# Patient Record
Sex: FEMALE | Race: WHITE | Employment: OTHER | URBAN - METROPOLITAN AREA
[De-identification: names, ages, dates, MRNs, and addresses within clinical notes are randomized per-mention and may not be internally consistent; named-entity substitution may affect disease eponyms.]

---

## 2020-07-08 ENCOUNTER — HOSPITAL ENCOUNTER (OUTPATIENT)
Dept: PHYSICAL THERAPY | Age: 70
Discharge: HOME | End: 2020-07-08
Payer: MEDICARE

## 2020-07-08 PROCEDURE — 97162 PT EVAL MOD COMPLEX 30 MIN: CPT

## 2020-07-08 PROCEDURE — 97110 THERAPEUTIC EXERCISES: CPT

## 2020-07-08 NOTE — PROGRESS NOTES
Kana Suarez  : 1950  [place]  [place] Annabella Brown 25 @ [address] [address].  [phone]   [phone]      OUTPATIENT PHYSICAL THERAPY: Daily Treatment Note 2020  Visit Count:  1    ICD-10: Treatment Diagnosis: Pain in left shoulder (M25.512) and Stiffness of left shoulder, not elsewhere classified (M25.612)  TREATMENT PLAN:  Effective Dates: 2020 TO 2020 (30 days). Frequency/Duration: 2 times a week for 30 Day(s)  GOALS: (Goals have been discussed and agreed upon with patient.)  Short-Term Functional Goals: Time Frame: 2 weeks  # Goal Status   1 Pt will confirm compliance with home program to facilitate improvement in function. NEW     Discharge goals: Time frame: 4 weeks   # Goal Status   1 Pt will demonstrate symptom-free cervical AROM in full range to be able to look over shoulder for ADLs. NEW   2 Pt will be able to use UEs without symptoms in a functional ROM to participate in tasks such as cutting toenails and pulling weeds. NEW       Pre-treatment Symptoms/Complaints:  Pt reports no significant pain at rest right now, but mostly with repetitive activity. Pain: Initial:   1/10 Post Session:  No pain   Medications Last Reviewed: 2020  Updated Objective Findings: Below measures from initial evaluation unless otherwise noted. 20  Pain at worst: 10/10  ROM: Cervical AROM WNL but pain in L posterior shoulder/neck with end range L rotation and with end range R rotation + flexion. B shoulder AROM WFL but pain with end range flexion on L. No pain with Apley's tests B.   UE ANDT: All within normal limits B  Strength: WNL for B shoulders with no pain with MMT.  Pt reports that pulling weeds that have a lot of resistance is what provokes L sided pain  Palpation: TTP in L LS near scapular insertion  DASH:     TREATMENT:   THERAPEUTIC ACTIVITY: (see chart for minutes): Therapeutic activities per grid below to improve mobility, strength, balance and coordination. Required minimal visual, verbal and tactile cues to improve independence with functional mobility and activities. THERAPEUTIC EXERCISE: (see chart for minutes):  Exercises per grid below to improve mobility, strength, balance and coordination. Required minimal visual, verbal and tactile cues to promote proper body alignment and promote proper body mechanics. Progressed resistance, range, repetitions and complexity of movement as indicated. NEUROMUSCULAR RE-EDUCATION: (see chart for minutes):  Exercise/activities per grid below to improve balance, coordination, kinesthetic sense, posture, pain, and proprioception. Required minimal visual, verbal and tactile cues to promote motor control of left, upper extremity(s). MANUAL THERAPY: (see chart for minutes): Joint mobilization, Soft tissue mobilization, skin mobilization, and muscle energy techniques were utilized and necessary because of the patient's restricted joint motion, restricted motion of soft tissue and pain . MODALITIES: (see chart for minutes): *  Hot Pack Therapy in order to provide analgesia. Date: 7/8/20 (visit 1)       Modalities:                Therapeutic Exercise: 25 min        Prone elbow lift: x5 B   S/L abduction: x5 L  S/L ER: x5 L   Chin tuck in prone: x5  Chin tuck in seated: 2x5 with tactile cues   Lat pull-down bent over: x5 L green band with chin tuck  Pt educated on using chin tuck while working in yard to [facility]. Pt educated on home program implementation and given printout. Neuromuscular re-education                Manual Therapy:                Therapeutic Activities:                  Home program: 7/8/20: chin tuck, lat pull down    [facility] Portal  Treatment/Session Summary:    · Response to Treatment: Pt tolerated exercises well and reported no pain afterwards.   · Communication/Consultation:  None today  · Equipment provided today:  7/8/20: green band  · Recommendations/Intent for next treatment session: Next visit will focus on improving pain and activity tolerance.     Total Treatment Billable Duration:  45 minutes  PT Patient Time In/Time Out  Time In: 0930  Time Out: [facility] 7775, PT, DPT    Future Appointments   Date Time Provider Carleen Jenkins   7/13/2020  9:30 AM Bettye Cancel [facility]   7/15/2020  9:30 AM [facility]e Cancel [facility]   7/20/2020  9:30 AM [facility]e Cancel [facility]   7/22/2020  9:30 AM [facility]e Cancel [facility]   7/27/2020  9:30 [facility]   7/29/2020  9:30 AM [facility]

## 2020-07-08 NOTE — THERAPY EVALUATION
Shree Cruz  : 1950  [place]  [place] Annabella Brown 25 @ [address], [address] [place] [address]  [phone]   [phone]       OUTPATIENT PHYSICAL THERAPY:Initial Assessment 2020   ICD-10: Treatment Diagnosis: Pain in left shoulder (M25.512) and Stiffness of left shoulder, not elsewhere classified (M25.612)    Precautions/Allergies:   Patient has no allergy information on record. Ambulatory/Rehab Services H2 Model Falls Risk Assessment    Risk Factors:       No Risk Factors Identified Ability to Rise from Chair:       (0)  Ability to rise in a single movement    Falls Prevention Plan:       No modifications necessary   Total: (5 or greater = High Risk): 0     [facility]. All Rights Reserved. [facility] Patent #[id number]. Federal Law prohibits the replication, distribution or use without written permission from [employer]      MEDICAL/REFERRING DIAGNOSIS:  Acute pain of right shoulder [M25.511]  Scapular dyskinesis [G25.89]   DATE OF ONSET: 2020  REFERRING PHYSICIAN: Jenny Stone MD MD Orders: evaluate and treat  Return MD Appointment: 20   INITIAL ASSESSMENT:  Ms. Bharti Sherman presents with impaired strength, ROM and pain of left shoulder and neck . This limits reaching and lifting tolerance and restricts ability to participate in ADLs. . Patient would benefit from skilled physical therapy to address above impairments. PROBLEM LIST (Impacting functional limitations):  1. Decreased Strength  2. Decreased ADL/Functional Activities  3. Increased Pain  4. Decreased Activity Tolerance  5. Edema/Girth INTERVENTIONS PLANNED: (Treatment may consist of any combination of the following)  1. Cryotherapy  2. Electrical Stimulation  3. Heat  4. Home Exercise Program (HEP)  5. Manual Therapy  6. Neuromuscular Re-education/Strengthening  7.  Therapeutic Activites  8. Therapeutic Exercise/Strengthening   TREATMENT PLAN:  Effective Dates: 7/8/2020 TO 8/7/2020 (30 days). Frequency/Duration: 2 times a week for 30 Day(s)  GOALS: (Goals have been discussed and agreed upon with patient.)  Short-Term Functional Goals: Time Frame: 2 weeks  # Goal Status   1 Pt will confirm compliance with home program to facilitate improvement in function. NEW     Discharge goals: Time frame: 4 weeks   # Goal Status   1 Pt will demonstrate symptom-free cervical AROM in full range to be able to look over shoulder for ADLs. NEW   2 Pt will be able to use UEs without symptoms in a functional ROM to participate in tasks such as cutting toenails and pulling weeds. NEW        Rehabilitation Potential For Stated Goals: Good  Regarding Krissy Renteria therapy, I certify that the treatment plan above will be carried out by a therapist or under their direction. Thank you for this referral,  Daljit Estrada PT, DPT     Referring Physician Signature: Misti Holcomb MD _______________________________ Date _____________     HISTORY:   History of Injury/Illness (Reason for Referral):  Pt reports ~March 2020 she was carrying groceries up the stairs and they were uneven and she strained the muscles of her L posterior shoulder/scapula. She reports that it bothers her whenever she does tasks where her head is down and she is leaning forward such as working in yard or bending over to cut toenails. She reports pain will feel hot and biting and she has to lie down for 5 minutes to get it to ease off. She reports the pain is severely limiting her activity tolerance. She notices it most with pulling weeds using L arm. She reports hot shower helps her shoulder. She would like to return to no pain with L shoulder use and return to yard work like normal.  Past Medical History/Comorbidities:   Ms. Diana Beard  has no past medical history on file. Ms. Diana Beard  has no past surgical history on file. High cholesterol.  Lasik surgery 7/1999  Social History/Living Environment:     Pt lives with spouse/significant other in two-story home with stairs in home and walk-in shower. Prior Level of Function/Work/Activity:  Pt is retired . Pt had unrestricted prior level of function. Current Medications:     No current outpatient medications on file. levothyroxine and rosuvastatin    Date Last Reviewed:  7/8/2020    Number of Personal Factors/Comorbidities that affect the Plan of Care: 1-2: MODERATE COMPLEXITY   EXAMINATION:   Pain at worst: 10/10  ROM: Cervical AROM WNL but pain in L posterior shoulder/neck with end range L rotation and with end range R rotation + flexion. B shoulder AROM WFL but pain with end range flexion on L. No pain with Apley's tests B.   UE ANDT: All within normal limits B  Strength: WNL for B shoulders with no pain with MMT. Pt reports that pulling weeds that have a lot of resistance is what provokes L sided pain  Palpation: TTP in L LS near scapular insertion   Body Structures Involved:  1. Nerves  2. Joints  3. Muscles Body Functions Affected:  1. Sensory/Pain  2. Neuromusculoskeletal  3. Movement Related Activities and Participation Affected:  1. General Tasks and Demands  2. Self Care  3. Community, Social and [place]   Number of elements (examined above) that affect the Plan of Care: 3: MODERATE COMPLEXITY   CLINICAL PRESENTATION:   Presentation: Evolving clinical presentation with changing clinical characteristics: MODERATE COMPLEXITY     CLINICAL DECISION MAKING:      OUTCOME MEASURE:   Initial outcome measure performed on 7/8/2020. Tool Used: Disabilities of the Arm, Shoulder and Hand (DASH) Questionnaire - Quick Version  Score:  Initial: 25/55  Most Recent: X/55 (Date: -- )   Interpretation of Score: The DASH is designed to measure the activities of daily living in person's with upper extremity dysfunction or pain. Each section is scored on a 1-5 scale, 5 representing the greatest disability.   The scores of each section are added together for a total score of 55. MEDICAL NECESSITY:   · Patient will benefit from skilled physical therapy to address their previously listed impairments in order to improve their independence with functional activities painfree. REASON FOR SERVICES/OTHER COMMENTS:  · Patient requires present interventions due to patient's inability to perform functional and recreational activities painfree.    Use of outcome tool(s) and clinical judgement create a POC that gives a: Questionable prediction of patient's progress: MODERATE COMPLEXITY        Total Duration: 45 min  PT Patient Time In/Time Out  Time In: 0930  Time Out: Professor Mora 108, PT, DPT

## 2020-07-10 ENCOUNTER — APPOINTMENT (OUTPATIENT)
Dept: PHYSICAL THERAPY | Age: 70
End: 2020-07-10
Payer: MEDICARE

## 2020-07-13 ENCOUNTER — HOSPITAL ENCOUNTER (OUTPATIENT)
Dept: PHYSICAL THERAPY | Age: 70
Discharge: HOME | End: 2020-07-13
Payer: MEDICARE

## 2020-07-13 PROCEDURE — 97110 THERAPEUTIC EXERCISES: CPT

## 2020-07-13 NOTE — PROGRESS NOTES
Husam Jimenez  : 1950  [place]  [place]: [place] Velora Offer. [facility] @ [address], [address] [place] [address]  [phone]   [phone]      OUTPATIENT PHYSICAL THERAPY: Daily Treatment Note 2020  Visit Count:  2    ICD-10: Treatment Diagnosis: Pain in left shoulder (M25.512) and Stiffness of left shoulder, not elsewhere classified (M25.612)  TREATMENT PLAN:  Effective Dates: 2020 TO 2020 (30 days). Frequency/Duration: 2 times a week for 30 Day(s)  GOALS: (Goals have been discussed and agreed upon with patient.)  Short-Term Functional Goals: Time Frame: 2 weeks  # Goal Status   1 Pt will confirm compliance with home program to facilitate improvement in function. NEW     Discharge goals: Time frame: 4 weeks   # Goal Status   1 Pt will demonstrate symptom-free cervical AROM in full range to be able to look over shoulder for ADLs. NEW   2 Pt will be able to use UEs without symptoms in a functional ROM to participate in tasks such as cutting toenails and pulling weeds. NEW       Pre-treatment Symptoms/Complaints:  Pt reports no pain currently. She reports it was too hot outside to work out in the yard so she didn't get to try cervical retraction with pulling weeds. She reports she did have occasional twinges but these were in neck and not in shoulder. Pain: Initial:   No pain Post Session:  No pain, \"I feel better than when I came in\"   Medications Last Reviewed: 2020  Updated Objective Findings: Below measures from initial evaluation unless otherwise noted. 20  Pain at worst: 10/10  ROM: Cervical AROM WNL but pain in L posterior shoulder/neck with end range L rotation and with end range R rotation + flexion. B shoulder AROM WFL but pain with end range flexion on L. No pain with Apley's tests B.   UE ANDT: All within normal limits B  Strength: WNL for B shoulders with no pain with MMT.  Pt reports that pulling weeds that have a lot of resistance is what provokes L sided pain  Palpation: TTP in L LS near scapular insertion  DASH: 25/55    TREATMENT:   THERAPEUTIC ACTIVITY: (see chart for minutes): Therapeutic activities per grid below to improve mobility, strength, balance and coordination. Required minimal visual, verbal and tactile cues to improve independence with functional mobility and activities. THERAPEUTIC EXERCISE: (see chart for minutes):  Exercises per grid below to improve mobility, strength, balance and coordination. Required minimal visual, verbal and tactile cues to promote proper body alignment and promote proper body mechanics. Progressed resistance, range, repetitions and complexity of movement as indicated. NEUROMUSCULAR RE-EDUCATION: (see chart for minutes):  Exercise/activities per grid below to improve balance, coordination, kinesthetic sense, posture, pain, and proprioception. Required minimal visual, verbal and tactile cues to promote motor control of left, upper extremity(s). MANUAL THERAPY: (see chart for minutes): Joint mobilization, Soft tissue mobilization, skin mobilization, and muscle energy techniques were utilized and necessary because of the patient's restricted joint motion, restricted motion of soft tissue and pain . MODALITIES: (see chart for minutes): *  Hot Pack Therapy in order to provide analgesia. Date: 7/8/20 (visit 1) 7/13/20 (visit 2)       Modalities:                Therapeutic Exercise: 25 min 40 min       Prone elbow lift: x5 B   S/L abduction: x5 L  S/L ER: x5 L   Chin tuck in prone: x5  Chin tuck in seated: 2x5 with tactile cues   Lat pull-down bent over: x5 L green band with chin tuck  Pt educated on using chin tuck while working in yard to [facility]. Pt educated on home program implementation and given printout. Prone elbow lift: x8 B with emphasis on relaxation.   Prone UE and head lift: x8 B with pt reported this felt good and relaxed her neck/shoulders  Prone cervical retraction: x10   Seated cervical retraction: x10 with tactile cues, visual demonstration and education on form. Pt able to perform properly following cues. Bent over pull down: x10 B green, x10 B black. Performed in kneeling, bent over: 2x10 B black with cervical retraction. Pt reported felt good and she would try this at home. Neuromuscular re-education                Manual Therapy:                Therapeutic Activities:                  Home program: 7/8/20: chin tuck, lat pull down    [employer] Portal  Treatment/Session Summary:    · Response to Treatment: Pt reported improved awareness with relaxation of neck/shoulder muscles as well as using cervical retraction with UE tasks. · Communication/Consultation:  None today  · Equipment provided today:  7/8/20: green band  · Recommendations/Intent for next treatment session: Next visit will focus on improving pain and activity tolerance.     Total Treatment Billable Duration:  40 minutes  PT Patient Time In/Time Out  Time In: 0930  Time Out: [address] Connie Urbina PT, DPT    Future Appointments   Date Time Provider Carleen Jenkins   7/15/2020  9:30 AM Bennetta Dung [facility]   7/20/2020  9:30 [facility]   7/22/2020  9:30 AM Bennetta Dung [facility]   7/27/2020  9:30 AM Bennetta Dung [facility]   7/29/2020  9:30 AM Bennetta Dung [facility]

## 2020-07-15 ENCOUNTER — HOSPITAL ENCOUNTER (OUTPATIENT)
Dept: PHYSICAL THERAPY | Age: 70
Discharge: HOME | End: 2020-07-15
Payer: MEDICARE

## 2020-07-15 PROCEDURE — 97110 THERAPEUTIC EXERCISES: CPT

## 2020-07-15 NOTE — PROGRESS NOTES
Glen Handsome  : 1950  [place]  Secondary: Yemi Roberto. [facility] @ [address]  [phone]   [phone]      OUTPATIENT PHYSICAL THERAPY: Daily Treatment Note 7/15/2020  Visit Count:  3    ICD-10: Treatment Diagnosis: Pain in left shoulder (M25.512) and Stiffness of left shoulder, not elsewhere classified (M25.612)  TREATMENT PLAN:  Effective Dates: 2020 TO 2020 (30 days). Frequency/Duration: 2 times a week for 30 Day(s)  GOALS: (Goals have been discussed and agreed upon with patient.)  Short-Term Functional Goals: Time Frame: 2 weeks  # Goal Status   1 Pt will confirm compliance with home program to facilitate improvement in function. NEW     Discharge goals: Time frame: 4 weeks   # Goal Status   1 Pt will demonstrate symptom-free cervical AROM in full range to be able to look over shoulder for ADLs. NEW   2 Pt will be able to use UEs without symptoms in a functional ROM to participate in tasks such as cutting toenails and pulling weeds. NEW       Pre-treatment Symptoms/Complaints:  Pt reports she had a busy day yesterday and so she couldn't do her exercises but that she did not have neck pain or shoulder pain with those activities. She has not been gardening yet but plans on doing some later this week. Pain: Initial:   No pain Post Session:  No pain   Medications Last Reviewed: 7/15/2020  Updated Objective Findings: Below measures from initial evaluation unless otherwise noted. 20  Pain at worst: 10/10  ROM: Cervical AROM WNL but pain in L posterior shoulder/neck with end range L rotation and with end range R rotation + flexion. B shoulder AROM WFL but pain with end range flexion on L. No pain with Apley's tests B.   UE ANDT: All within normal limits B  Strength: WNL for B shoulders with no pain with MMT.  Pt reports that pulling weeds that have a lot of resistance is what provokes L sided pain  Palpation: TTP in L LS near scapular insertion  DASH: 25/55    TREATMENT:   THERAPEUTIC ACTIVITY: (see chart for minutes): Therapeutic activities per grid below to improve mobility, strength, balance and coordination. Required minimal visual, verbal and tactile cues to improve independence with functional mobility and activities. THERAPEUTIC EXERCISE: (see chart for minutes):  Exercises per grid below to improve mobility, strength, balance and coordination. Required minimal visual, verbal and tactile cues to promote proper body alignment and promote proper body mechanics. Progressed resistance, range, repetitions and complexity of movement as indicated. NEUROMUSCULAR RE-EDUCATION: (see chart for minutes):  Exercise/activities per grid below to improve balance, coordination, kinesthetic sense, posture, pain, and proprioception. Required minimal visual, verbal and tactile cues to promote motor control of left, upper extremity(s). MANUAL THERAPY: (see chart for minutes): Joint mobilization, Soft tissue mobilization, skin mobilization, and muscle energy techniques were utilized and necessary because of the patient's restricted joint motion, restricted motion of soft tissue and pain . MODALITIES: (see chart for minutes): *  Hot Pack Therapy in order to provide analgesia. Date: 7/8/20 (visit 1) 7/13/20 (visit 2)  7/15/20 (visit 3)      Modalities:                Therapeutic Exercise: 25 min 40 min 40 min      Prone elbow lift: x5 B   S/L abduction: x5 L  S/L ER: x5 L   Chin tuck in prone: x5  Chin tuck in seated: 2x5 with tactile cues   Lat pull-down bent over: x5 L green band with chin tuck  Pt educated on using chin tuck while working in yard to [facility]. Pt educated on home program implementation and given printout. Prone elbow lift: x8 B with emphasis on relaxation.   Prone UE and head lift: x8 B with pt reported this felt good and relaxed her neck/shoulders  Prone cervical retraction: x10   Seated cervical retraction: x10 with tactile cues, visual demonstration and education on form. Pt able to perform properly following cues. Bent over pull down: x10 B green, x10 B black. Performed in kneeling, bent over: 2x10 B black with cervical retraction. Pt reported felt good and she would try this at home. Prone elbow lift: x10 B   Prone UE and head lift: x10 B  Prone UE, head and LE lift: x10 B   Prone head lift: x10   Bent over pull down: x5 B in tall kneeling while self-palpating opposite obliques. x10 B in quadruped with black band. x10 B in quadruped with grey band. Neuromuscular re-education                Manual Therapy:                Therapeutic Activities:                  Home program: 7/8/20: chin tuck, lat pull down    [facility]  Treatment/Session Summary:    · Response to Treatment: Pt reports she has not had pain with normal ADLs but has not tried high level tasks such as pulling weeds yet. She reports she will try this this week and was educated on using movement modifications including cervical retraction and engaging opposite obliques. · Communication/Consultation:  None today  · Equipment provided today:  7/8/20: green band  · Recommendations/Intent for next treatment session: Next visit will focus on improving pain and activity tolerance.     Total Treatment Billable Duration:  40 minutes  PT Patient Time In/Time Out  Time In: 0930  Time Out: [address] Lamine Sadler PT, DPT    Future Appointments   Date Time Provider Carleen Jenkins   7/20/2020  9:30 AM Manuela Limon [facility]   7/22/2020  9:30 AM Manuelaskyla Limon [facility]   7/27/2020  9:30 AM Manuela Delta [facility]   7/29/2020  9:30 AM Manuela Delta [facility]

## 2020-07-17 ENCOUNTER — APPOINTMENT (OUTPATIENT)
Dept: PHYSICAL THERAPY | Age: 70
End: 2020-07-17
Payer: MEDICARE

## 2020-07-20 ENCOUNTER — HOSPITAL ENCOUNTER (OUTPATIENT)
Dept: PHYSICAL THERAPY | Age: 70
Discharge: HOME | End: 2020-07-20
Payer: MEDICARE

## 2020-07-20 PROCEDURE — 97110 THERAPEUTIC EXERCISES: CPT

## 2020-07-20 NOTE — PROGRESS NOTES
Joann Lilli  : 1950  [place]  [place] Annabella Brown 25 @ [address]  [phone]   [phone]      OUTPATIENT PHYSICAL THERAPY: Daily Treatment Note 2020  Visit Count:  4    ICD-10: Treatment Diagnosis: Pain in left shoulder (M25.512) and Stiffness of left shoulder, not elsewhere classified (M25.612)  TREATMENT PLAN:  Effective Dates: 2020 TO 2020 (30 days). Frequency/Duration: 2 times a week for 30 Day(s)  GOALS: (Goals have been discussed and agreed upon with patient.)  Short-Term Functional Goals: Time Frame: 2 weeks  # Goal Status   1 Pt will confirm compliance with home program to facilitate improvement in function. MET     Discharge goals: Time frame: 4 weeks   # Goal Status   1 Pt will demonstrate symptom-free cervical AROM in full range to be able to look over shoulder for ADLs. NEW   2 Pt will be able to use UEs without symptoms in a functional ROM to participate in tasks such as cutting toenails and pulling weeds. NEW       Pre-treatment Symptoms/Complaints:  Pt reports her shoulder has been doing very well. She reports she pulled weeds over the weekend and had no pain. She has not bent over to clip her toenails yet but will try this later this week. Pain: Initial:   No pain Post Session:  No pain   Medications Last Reviewed: 2020  Updated Objective Findings: Below measures from initial evaluation unless otherwise noted. 20  Pain at worst: 10/10  ROM: Cervical AROM WNL but pain in L posterior shoulder/neck with end range L rotation and with end range R rotation + flexion. B shoulder AROM WFL but pain with end range flexion on L. No pain with Apley's tests B.   UE ANDT: All within normal limits B  Strength: WNL for B shoulders with no pain with MMT.  Pt reports that pulling weeds that have a lot of resistance is what provokes L sided pain  Palpation: TTP in L LS near scapular insertion  DASH: 25/55    TREATMENT:   THERAPEUTIC ACTIVITY: (see chart for minutes): Therapeutic activities per grid below to improve mobility, strength, balance and coordination. Required minimal visual, verbal and tactile cues to improve independence with functional mobility and activities. THERAPEUTIC EXERCISE: (see chart for minutes):  Exercises per grid below to improve mobility, strength, balance and coordination. Required minimal visual, verbal and tactile cues to promote proper body alignment and promote proper body mechanics. Progressed resistance, range, repetitions and complexity of movement as indicated. NEUROMUSCULAR RE-EDUCATION: (see chart for minutes):  Exercise/activities per grid below to improve balance, coordination, kinesthetic sense, posture, pain, and proprioception. Required minimal visual, verbal and tactile cues to promote motor control of left, upper extremity(s). MANUAL THERAPY: (see chart for minutes): Joint mobilization, Soft tissue mobilization, skin mobilization, and muscle energy techniques were utilized and necessary because of the patient's restricted joint motion, restricted motion of soft tissue and pain . MODALITIES: (see chart for minutes): *  Hot Pack Therapy in order to provide analgesia. Date: 7/8/20 (visit 1) 7/13/20 (visit 2)  7/15/20 (visit 3)  7/20/20 (visit 4)     Modalities:                Therapeutic Exercise: 25 min 40 min 40 min 40 min     Prone elbow lift: x5 B   S/L abduction: x5 L  S/L ER: x5 L   Chin tuck in prone: x5  Chin tuck in seated: 2x5 with tactile cues   Lat pull-down bent over: x5 L green band with chin tuck  Pt educated on using chin tuck while working in yard to [facility]. Pt educated on home program implementation and given printout. Prone elbow lift: x8 B with emphasis on relaxation.   Prone UE and head lift: x8 B with pt reported this felt good and relaxed her neck/shoulders  Prone cervical retraction: x10   Seated cervical retraction: x10 with tactile cues, visual demonstration and education on form. Pt able to perform properly following cues. Bent over pull down: x10 B green, x10 B black. Performed in kneeling, bent over: 2x10 B black with cervical retraction. Pt reported felt good and she would try this at home. Prone elbow lift: x10 B   Prone UE and head lift: x10 B  Prone UE, head and LE lift: x10 B   Prone head lift: x10   Bent over pull down: x5 B in tall kneeling while self-palpating opposite obliques. x10 B in quadruped with black band. x10 B in quadruped with grey band. Prone elbow lift: x10 B   Prone UE and head lift: x10 B  Prone UE, head and LE lift: x10 B   Bent over pull down: 2x10 B in quadruped with grey band. Pt given written instructions on home program including 2 exercises, movement modifications (chin tuck, use trunk), and activity strategies (rest before she needs it, sit upright while working on her feet). Neuromuscular re-education                Manual Therapy:                Therapeutic Activities:                  Home program: 7/8/20: chin tuck, lat pull down    MedBridge Portal  Treatment/Session Summary:    · Response to Treatment: Pt progressing well and reports no pain at rest, with exercises, or with pulling weeds. She said that the weeds had less resistance than normal which helped. She will continue exercises and movement modifications at home. · Communication/Consultation:  None today  · Equipment provided today:  7/8/20: green band 7/20/20: black band  · Recommendations/Intent for next treatment session: Next visit will focus on improving pain and activity tolerance.     Total Treatment Billable Duration:  40 minutes  PT Patient Time In/Time Out  Time In: 0930  Time Out: [address], PT, DPT    Future Appointments   Date Time Provider Carleen Jenkins   7/22/2020  9:30 AM Amanda Gordon [facility]   7/27/2020  9:30 AM Amanda Gordon [facility]   7/29/2020  9:30 AM Laureano Medellin [facility]

## 2020-07-22 ENCOUNTER — HOSPITAL ENCOUNTER (OUTPATIENT)
Dept: PHYSICAL THERAPY | Age: 70
Discharge: HOME | End: 2020-07-22
Payer: MEDICARE

## 2020-07-22 PROCEDURE — 97110 THERAPEUTIC EXERCISES: CPT

## 2020-07-22 NOTE — PROGRESS NOTES
Mary Ground  : 1950  [place]  [place]. [facility] @ [address]  [phone]   [phone]      OUTPATIENT PHYSICAL THERAPY: Daily Treatment Note 2020  Visit Count:  5    ICD-10: Treatment Diagnosis: Pain in left shoulder (M25.512) and Stiffness of left shoulder, not elsewhere classified (M25.612)  TREATMENT PLAN:  Effective Dates: 2020 TO 2020 (30 days). Frequency/Duration: 2 times a week for 30 Day(s)  GOALS: (Goals have been discussed and agreed upon with patient.)  Short-Term Functional Goals: Time Frame: 2 weeks  # Goal Status   1 Pt will confirm compliance with home program to facilitate improvement in function. MET     Discharge goals: Time frame: 4 weeks   # Goal Status   1 Pt will demonstrate symptom-free cervical AROM in full range to be able to look over shoulder for ADLs. NEW   2 Pt will be able to use UEs without symptoms in a functional ROM to participate in tasks such as cutting toenails and pulling weeds. NEW       Pre-treatment Symptoms/Complaints:  Pt reports she continues to do well. She reports that she mowed the lawn yesterday and it went well. She felt some tightness in posterior L shoulder but no pain. Pain: Initial:   No pain Post Session:  No pain   Medications Last Reviewed: 2020  Updated Objective Findings: Below measures from initial evaluation unless otherwise noted. 20  Pain at worst: 10/10  ROM: Cervical AROM WNL but pain in L posterior shoulder/neck with end range L rotation and with end range R rotation + flexion. B shoulder AROM WFL but pain with end range flexion on L. No pain with Apley's tests B.   UE ANDT: All within normal limits B  Strength: WNL for B shoulders with no pain with MMT.  Pt reports that pulling weeds that have a lot of resistance is what provokes L sided pain  Palpation: TTP in L LS near scapular insertion  DASH: 25/55    TREATMENT:   THERAPEUTIC ACTIVITY: (see chart for minutes): Therapeutic activities per grid below to improve mobility, strength, balance and coordination. Required minimal visual, verbal and tactile cues to improve independence with functional mobility and activities. THERAPEUTIC EXERCISE: (see chart for minutes):  Exercises per grid below to improve mobility, strength, balance and coordination. Required minimal visual, verbal and tactile cues to promote proper body alignment and promote proper body mechanics. Progressed resistance, range, repetitions and complexity of movement as indicated. NEUROMUSCULAR RE-EDUCATION: (see chart for minutes):  Exercise/activities per grid below to improve balance, coordination, kinesthetic sense, posture, pain, and proprioception. Required minimal visual, verbal and tactile cues to promote motor control of left, upper extremity(s). MANUAL THERAPY: (see chart for minutes): Joint mobilization, Soft tissue mobilization, skin mobilization, and muscle energy techniques were utilized and necessary because of the patient's restricted joint motion, restricted motion of soft tissue and pain . MODALITIES: (see chart for minutes): *  Hot Pack Therapy in order to provide analgesia. Date: 7/8/20 (visit 1) 7/13/20 (visit 2)  7/15/20 (visit 3)  7/20/20 (visit 4)  7/22/20 (visit 5)    Modalities:                Therapeutic Exercise: 25 min 40 min 40 min 40 min 40 min    Prone elbow lift: x5 B   S/L abduction: x5 L  S/L ER: x5 L   Chin tuck in prone: x5  Chin tuck in seated: 2x5 with tactile cues   Lat pull-down bent over: x5 L green band with chin tuck  Pt educated on using chin tuck while working in yard to [facility]. Pt educated on home program implementation and given printout. Prone elbow lift: x8 B with emphasis on relaxation.   Prone UE and head lift: x8 B with pt reported this felt good and relaxed her neck/shoulders  Prone cervical retraction: x10   Seated cervical retraction: x10 with tactile cues, visual demonstration and education on form. Pt able to perform properly following cues. Bent over pull down: x10 B green, x10 B black. Performed in kneeling, bent over: 2x10 B black with cervical retraction. Pt reported felt good and she would try this at home. Prone elbow lift: x10 B   Prone UE and head lift: x10 B  Prone UE, head and LE lift: x10 B   Prone head lift: x10   Bent over pull down: x5 B in tall kneeling while self-palpating opposite obliques. x10 B in quadruped with black band. x10 B in quadruped with grey band. Prone elbow lift: x10 B   Prone UE and head lift: x10 B  Prone UE, head and LE lift: x10 B   Bent over pull down: 2x10 B in quadruped with grey band. Pt given written instructions on home program including 2 exercises, movement modifications (chin tuck, use trunk), and activity strategies (rest before she needs it, sit upright while working on her feet). UBE: 3'/3' L4  Prone elbow lift: x10 B  Prone UE, head and LE lift: x10 B  Cable pull-down: [id number]# B  Cable D2 flexion: 2x0x10# B  Band pull-apart: 2x10 red  Pt educated on performing these at home using own band on the weekend. Neuromuscular re-education                Manual Therapy:                Therapeutic Activities:                  Home program: 7/8/20: chin tuck, lat pull down    MedBridge Portal  Treatment/Session Summary:    · Response to Treatment: Pt advanced to higher volume and intensity of shoulder exercises and reported they went well. She was educated on performing them over the weekend to continue strengthening. · Communication/Consultation:  None today  · Equipment provided today:  7/8/20: green band 7/20/20: black band  · Recommendations/Intent for next treatment session: Next visit will focus on improving pain and activity tolerance.     Total Treatment Billable Duration:  40 minutes  PT Patient Time In/Time Out  Time In: 0930  Time Out: [facility] Angel Oneil, DPT    Future Appointments   Date Time Provider Carleen Jenkins   7/27/2020  9:30 AM Radha Robles [facility]   7/29/2020  9:30 AM Radha Robles [facility]

## 2020-07-24 ENCOUNTER — APPOINTMENT (OUTPATIENT)
Dept: PHYSICAL THERAPY | Age: 70
End: 2020-07-24
Payer: MEDICARE

## 2020-07-27 ENCOUNTER — HOSPITAL ENCOUNTER (OUTPATIENT)
Dept: PHYSICAL THERAPY | Age: 70
Discharge: HOME | End: 2020-07-27
Payer: MEDICARE

## 2020-07-27 PROCEDURE — 97110 THERAPEUTIC EXERCISES: CPT

## 2020-07-27 NOTE — PROGRESS NOTES
Joann Lilli  : 1950  [place]  Secondary: [facility]. [facility] @ [address], [address] [place] [address]  [phone]   [phone]      OUTPATIENT PHYSICAL THERAPY: Daily Treatment Note 2020  Visit Count:  6    ICD-10: Treatment Diagnosis: Pain in left shoulder (M25.512) and Stiffness of left shoulder, not elsewhere classified (M25.612)  TREATMENT PLAN:  Effective Dates: 2020 TO 2020 (30 days). Frequency/Duration: 2 times a week for 30 Day(s)  GOALS: (Goals have been discussed and agreed upon with patient.)  Short-Term Functional Goals: Time Frame: 2 weeks  # Goal Status   1 Pt will confirm compliance with home program to facilitate improvement in function. MET     Discharge goals: Time frame: 4 weeks   # Goal Status   1 Pt will demonstrate symptom-free cervical AROM in full range to be able to look over shoulder for ADLs. MET   2 Pt will be able to use UEs without symptoms in a functional ROM to participate in tasks such as cutting toenails and pulling weeds. MET       Pre-treatment Symptoms/Complaints:  Pt reports her shoulder has been doing well. She reports she was able to work in yard over the weekend including pulling weeds. She reports she remembered to move from hips instead of neck and she took breaks and she reported no pain or limitation with activity. Pain: Initial:   No pain Post Session:  No pain   Medications Last Reviewed: 2020  Updated Objective Findings: Below measures from initial evaluation unless otherwise noted. 20  Pain at worst: 10/10  ROM: Cervical AROM WNL but pain in L posterior shoulder/neck with end range L rotation and with end range R rotation + flexion. B shoulder AROM WFL but pain with end range flexion on L. No pain with Apley's tests B.   UE ANDT: All within normal limits B  Strength: WNL for B shoulders with no pain with MMT.  Pt reports that pulling weeds that have a lot of resistance is what provokes L sided pain  Palpation: TTP in L LS near scapular insertion  DASH: 25/55    TREATMENT:   THERAPEUTIC ACTIVITY: (see chart for minutes): Therapeutic activities per grid below to improve mobility, strength, balance and coordination. Required minimal visual, verbal and tactile cues to improve independence with functional mobility and activities. THERAPEUTIC EXERCISE: (see chart for minutes):  Exercises per grid below to improve mobility, strength, balance and coordination. Required minimal visual, verbal and tactile cues to promote proper body alignment and promote proper body mechanics. Progressed resistance, range, repetitions and complexity of movement as indicated. NEUROMUSCULAR RE-EDUCATION: (see chart for minutes):  Exercise/activities per grid below to improve balance, coordination, kinesthetic sense, posture, pain, and proprioception. Required minimal visual, verbal and tactile cues to promote motor control of left, upper extremity(s). MANUAL THERAPY: (see chart for minutes): Joint mobilization, Soft tissue mobilization, skin mobilization, and muscle energy techniques were utilized and necessary because of the patient's restricted joint motion, restricted motion of soft tissue and pain . MODALITIES: (see chart for minutes): *  Hot Pack Therapy in order to provide analgesia. Date: 7/20/20 (visit 4)  7/22/20 (visit 5)  7/27/20 (visit 6)    Modalities:              Therapeutic Exercise: 40 min 40 min 40 min     Prone elbow lift: x10 B   Prone UE and head lift: x10 B  Prone UE, head and LE lift: x10 B   Bent over pull down: 2x10 B in quadruped with grey band. Pt given written instructions on home program including 2 exercises, movement modifications (chin tuck, use trunk), and activity strategies (rest before she needs it, sit upright while working on her feet).   UBE: 3'/3' L4  Prone elbow lift: x10 B  Prone UE, head and LE lift: x10 B  Cable pull-down: [id number]# B  Cable D2 flexion: 2x0x10# B  Band pull-apart: 2x10 red  Pt educated on performing these at home using own band on the weekend. UBE: 3'/3' L4  Prone UE, head and LE lift: x10 B  Cable pull-down: [id number]# B  Cable D2 flexion: 1d49x10# B  Cable row: 1y16w13# B  Cable press: [id number]# B    Neuromuscular re-education              Manual Therapy:              Therapeutic Activities:                Home program: 7/8/20: chin tuck, lat pull down    MedBridge Portal  Treatment/Session Summary:    · Response to Treatment: Pt tolerated exercises well. She reports no pain at rest or with activities and confirmed compliance with home program.   · Communication/Consultation:  None today  · Equipment provided today:  7/8/20: green band 7/20/20: black band  · Recommendations/Intent for next treatment session: Next visit will focus on improving pain and activity tolerance.     Total Treatment Billable Duration:  40 minutes  PT Patient Time In/Time Out  Time In: 7063  Time Out: [facility] 77-75, PT, DPT    Future Appointments   Date Time Provider Carleen Jenkins   7/29/2020  9:30 AM Alexx Knox [facility]

## 2020-07-29 ENCOUNTER — HOSPITAL ENCOUNTER (OUTPATIENT)
Dept: PHYSICAL THERAPY | Age: 70
Discharge: HOME | End: 2020-07-29
Payer: MEDICARE

## 2020-07-29 PROCEDURE — 97110 THERAPEUTIC EXERCISES: CPT

## 2020-07-29 NOTE — PROGRESS NOTES
Sruthi Blood  : 1950  [place]  Secondary: [place] Annabella Brown 25 @ [place] [address].  [phone]   [phone]      OUTPATIENT PHYSICAL THERAPY: Daily Treatment and discharge Note 2020  Visit Count:  7    ICD-10: Treatment Diagnosis: Pain in left shoulder (M25.512) and Stiffness of left shoulder, not elsewhere classified (M25.612)  TREATMENT PLAN:  Effective Dates: 2020 TO 2020 (30 days). Frequency/Duration: 2 times a week for 30 Day(s)  GOALS: (Goals have been discussed and agreed upon with patient.)  Short-Term Functional Goals: Time Frame: 2 weeks  # Goal Status   1 Pt will confirm compliance with home program to facilitate improvement in function. MET     Discharge goals: Time frame: 4 weeks   # Goal Status   1 Pt will demonstrate symptom-free cervical AROM in full range to be able to look over shoulder for ADLs. MET   2 Pt will be able to use UEs without symptoms in a functional ROM to participate in tasks such as cutting toenails and pulling weeds. MET       Pre-treatment Symptoms/Complaints:  Pt reports her shoulder and neck have continued to do well and she feels ready to discharge. She reports no limitations. Pain: Initial:   No pain Post Session:  No pain   Medications Last Reviewed: 2020  Updated Objective Findings: Below measures from initial evaluation unless otherwise noted. 20  Pain at worst: 10/10  ROM: Cervical AROM WNL but pain in L posterior shoulder/neck with end range L rotation and with end range R rotation + flexion. B shoulder AROM WFL but pain with end range flexion on L. No pain with Apley's tests B.   UE ANDT: All within normal limits B  Strength: WNL for B shoulders with no pain with MMT.  Pt reports that pulling weeds that have a lot of resistance is what provokes L sided pain  Palpation: TTP in L LS near scapular insertion  DASH: 25/55    20: DASH 16/55    TREATMENT:   THERAPEUTIC ACTIVITY: (see chart for minutes): Therapeutic activities per grid below to improve mobility, strength, balance and coordination. Required minimal visual, verbal and tactile cues to improve independence with functional mobility and activities. THERAPEUTIC EXERCISE: (see chart for minutes):  Exercises per grid below to improve mobility, strength, balance and coordination. Required minimal visual, verbal and tactile cues to promote proper body alignment and promote proper body mechanics. Progressed resistance, range, repetitions and complexity of movement as indicated. NEUROMUSCULAR RE-EDUCATION: (see chart for minutes):  Exercise/activities per grid below to improve balance, coordination, kinesthetic sense, posture, pain, and proprioception. Required minimal visual, verbal and tactile cues to promote motor control of left, upper extremity(s). MANUAL THERAPY: (see chart for minutes): Joint mobilization, Soft tissue mobilization, skin mobilization, and muscle energy techniques were utilized and necessary because of the patient's restricted joint motion, restricted motion of soft tissue and pain . MODALITIES: (see chart for minutes): *  Hot Pack Therapy in order to provide analgesia. Date: 7/20/20 (visit 4)  7/22/20 (visit 5)  7/27/20 (visit 6) 7/29/20 (visit 7)    Modalities:              Therapeutic Exercise: 40 min 40 min 40 min 40 min    Prone elbow lift: x10 B   Prone UE and head lift: x10 B  Prone UE, head and LE lift: x10 B   Bent over pull down: 2x10 B in quadruped with grey band. Pt given written instructions on home program including 2 exercises, movement modifications (chin tuck, use trunk), and activity strategies (rest before she needs it, sit upright while working on her feet).   UBE: 3'/3' L4  Prone elbow lift: x10 B  Prone UE, head and LE lift: x10 B  Cable pull-down: [id number]# B  Cable D2 flexion: 2x0x10# B  Band pull-apart: 2x10 red  Pt educated on performing these at home using own band on the weekend. UBE: 3'/3' L4  Prone UE, head and LE lift: x10 B  Cable pull-down: 4t49h48# B  Cable D2 flexion: 3v11m34# B  Cable row: [id number]# B  Cable press: [id number]# B UBE: 3'/3' L4  Prone UE, head and LE lift: x10 B  Cable pull-down: [id number]# B  Cable D2 flexion: 6l39h99# B  Cable row: 6w08u31# B  Cable press: 1o59p41# B   Neuromuscular re-education              Manual Therapy:              Therapeutic Activities:                Home program: 7/8/20: chin tuck, lat pull down    MedBridge Portal  Treatment/Session Summary:    · Response to Treatment: Pt tolerated exercises well. She has met all goals and reports no pain or limitations. She is being discharged at this time which she agreed to. · Communication/Consultation:  None today  · Equipment provided today:  7/8/20: green band 7/20/20: black band  · Recommendations: D/C to home program    Total Treatment Billable Duration:  40 minutes  PT Patient Time In/Time Out  Time In: 0930  Time Out:  [facility], D[facility]    No future appointments.

## 2020-07-31 ENCOUNTER — APPOINTMENT (OUTPATIENT)
Dept: PHYSICAL THERAPY | Age: 70
End: 2020-07-31
Payer: MEDICARE

## 2020-08-13 NOTE — PROGRESS NOTES
I am accessing Ms. Zenia Nava chart as a part of our department's internal chart auditing process. I certify that Ms. Alyssa Huang is, or was, a patient in our department.   Thank you,  Nino Salcedo, PT  8/13/2020

## 2021-04-08 ENCOUNTER — HOSPITAL ENCOUNTER (OUTPATIENT)
Dept: PHYSICAL THERAPY | Age: 71
Discharge: HOME | End: 2021-04-08
Payer: MEDICARE

## 2021-04-08 PROCEDURE — 97162 PT EVAL MOD COMPLEX 30 MIN: CPT

## 2021-04-08 PROCEDURE — 97110 THERAPEUTIC EXERCISES: CPT

## 2021-04-09 NOTE — PROGRESS NOTES
Davida Garcia  : 1950  [place]  [place] Annabella Brown 25 @ [address]  [phone]   [phone]      OUTPATIENT PHYSICAL THERAPY: Daily Treatment Note 2021  Date of Onset: 21  Visit Count:  1    ICD-10: Treatment Diagnosis: Pain in right shoulder (M25.511) and Stiffness of right shoulder, not elsewhere classified (M25.611) and Pain in right hip (M25.551) and Stiffness of right hip, not elsewhere classified (M25.651)  TREATMENT PLAN:  Effective Dates: 2021 TO 21. Frequency/Duration: 2 time a week for 6 weeks. GOALS: (Goals have been discussed and agreed upon with patient.)  Short-Term Functional Goals: Time Frame: 4 weeks  # Goal Status   1 Pt will confirm compliance with home program to facilitate improvement in function. NEW       Discharge goals: Time frame: 6 weeks   # Goal Status   1 Pt will be able to elevate UE to at least 140° without symptoms in order to participate in ADLs such as reaching overhead and pulling shirt on. NEW   2 Pt will be able to reach over head to touch opposite scapula without symptoms to be able to participate in ADLs such as reaching overhead and washing head. NEW   3 Pt will be able to reach across body to touch spine of opposite scapula without symptoms to be able to participate in ADLs such as reaching tasks. NEW   4 Pt will be able to reach behind back to touch TLJ without symptoms to be able to participate in dressing and toileting activities. NEW   5 Pt will have at least 125° of hip flexion to be able to put on socks/shoes and get in/out of the tub.  NEW       Pre-treatment Symptoms/Complaints:  Pt reports hip aches a bit but shoulder doesn't hurt at rest.   Pain: Initial:   3-4/10 R hip Post Session:  No increase   Medications Last Reviewed: 2021  Updated Objective Findings: Below measures from initial evaluation unless otherwise noted.  4/8/21  Pain at worst: 4/10  Observation: Gait: no compensations noted  ROM:    Left Right   Elevation (°) 152 130 with pain and tightness   Behind neck reach To opposite scapula To upper back   Behind back reach To opposite scapula To lumbar region with pain   Cross body reach To opposite scapula To lateral aspect of scapula with pain   R ER WFL but IR limited to ~30 ° per visual estimation. R hip flexion limited to ~90 ° per visual estimation and R hip IR WFL but tight and stiff at end range. UE ANDT: All within normal limits B  Lower body ANDT: All tests WNL B  Strength: 5/5 throughout B shoulders. R hip abduction and reverse clam fatigued quickly in set of 10 reps  DASH: 46/55    TREATMENT:   THERAPEUTIC ACTIVITY: (see chart for minutes): Therapeutic activities per grid below to improve mobility, strength, balance and coordination. Required minimal visual, verbal and tactile cues to improve independence with functional mobility and activities. THERAPEUTIC EXERCISE: (see chart for minutes):  Exercises per grid below to improve mobility, strength, balance and coordination. Required minimal visual, verbal and tactile cues to promote proper body alignment and promote proper body mechanics. Progressed resistance, range, repetitions and complexity of movement as indicated. NEUROMUSCULAR RE-EDUCATION: (see chart for minutes):  Exercise/activities per grid below to improve balance, coordination, kinesthetic sense, posture, pain, and proprioception. Required minimal visual, verbal and tactile cues to promote static and dynamic balance in standing. MANUAL THERAPY: (see chart for minutes): Joint mobilization, Soft tissue mobilization, skin mobilization, and muscle energy techniques were utilized and necessary because of the patient's restricted joint motion, restricted motion of soft tissue and pain . MODALITIES: (see chart for minutes):      *  Cold Pack Therapy in order to provide analgesia. Date: 4/8/21 (visit 1)       Modalities:                Therapeutic Exercise: 30 min        S/L ER: x5 R  Doorway stretch: x2 holds   Horizontal adduction with IR force: x3 R   Sleeper stretch: x2 holds R  Clam: x10 R  Reverse clam: x10 R  Hip abduction: x10 R   SKTC: x1 hold R  Reviewed pt's previous home program and educated her on continuing band ER and band flexion for shoulder. Pt educated on home program implementation and given printout. Neuromuscular re-education                Manual Therapy:                Therapeutic Activities:                  Home program: 4/8/21: SKTC, reverse clam, S/L hip abduction, cross body shoulder stretch, sleeper stretch, doorway stretch    Med[employer] Portal  Treatment/Session Summary:    · Response to Treatment: Pt tolerated exercises well and was most challenged with end range stretches for shoulder and hip strength exercises. · Communication/Consultation:  None today  · Equipment provided today: None  · Recommendations/Intent for next treatment session: Next visit will focus on improving R shoulder flexion and IR and R hip flexion ROM and hip abduction strength.     Total Treatment Billable Duration:  45 minutes  PT Patient Time In/Time Out  Time In: 1205  Time Out: [address] Katiuska Vasquez PT, DPT    Future Appointments   Date Time Provider Carleen Jenkins   4/13/2021  9:30 AM Alfonza Kuster [facility]   4/15/2021  9:30 AM Alfonza Kuster [facility]   4/20/2021  9:30 AM Alfonza Kuster [facility]   4/22/2021  9:30 AM Alfonza Kuster [facility]   4/27/2021  9:30 AM Alfonza Kuster [facility]   4/29/2021  9:30 AM Alfonza Kuster [facility]

## 2021-04-09 NOTE — PROGRESS NOTES
Codi Dhillon  : 1950  [place]  [place] Annabella Brown 25 @ [address] [place].  [phone]   [phone]       OUTPATIENT PHYSICAL THERAPY:Initial Assessment 2021   ICD-10: Treatment Diagnosis: Pain in right shoulder (M25.511) and Stiffness of right shoulder, not elsewhere classified (M25.611) and Pain in right hip (M25.551) and Stiffness of right hip, not elsewhere classified (M25.651)  Precautions/Allergies:   Patient has no allergy information on record. Ambulatory/Rehab Services H2 Model Falls Risk Assessment    Risk Factors:       No Risk Factors Identified Ability to Rise from Chair:       (0)  Ability to rise in a single movement    Falls Prevention Plan:       No modifications necessary   Total: (5 or greater = High Risk): 0     [facility] of [employer]. All Rights Reserved. [facility] [employer] Patent #[id number]. Federal Law prohibits the replication, distribution or use without written permission from [employer]      MEDICAL/REFERRING DIAGNOSIS:  Other displaced fracture of upper end of right humerus, subsequent encounter for fracture with routine healing [S42.291D]   DATE OF ONSET: 20  REFERRING PHYSICIAN: Dewayne Coyne MD MD Orders: evaluate and treat: pain relief, ROM increase, strength and conditioning for hip and shoulder  Return MD Appointment: TBD   INITIAL ASSESSMENT:  Ms. Greta Terry presents with impaired strength, ROM and pain of right shoulder and hip  secondary to fall. This limits turning, reaching, lifting and bending tolerance and restricts ability to participate in ADLs, functional mobility and recreational activities. . Patient would benefit from skilled physical therapy to address above impairments. PROBLEM LIST (Impacting functional limitations):  1. Decreased Strength  2. Decreased ADL/Functional Activities  3.  Decreased Balance  4. Increased Pain  5. Decreased Activity Tolerance  6. Decreased Flexibility/Joint Mobility INTERVENTIONS PLANNED: (Treatment may consist of any combination of the following)  1. Cryotherapy  2. Electrical Stimulation  3. Heat  4. Home Exercise Program (HEP)  5. Manual Therapy  6. Neuromuscular Re-education/Strengthening  7. Therapeutic Activites  8. Therapeutic Exercise/Strengthening   TREATMENT PLAN:  Effective Dates: 4/8/2021 TO 5/20/21. Frequency/Duration: 2 time a week for 6 weeks. GOALS: (Goals have been discussed and agreed upon with patient.)  Short-Term Functional Goals: Time Frame: 4 weeks  # Goal Status   1 Pt will confirm compliance with home program to facilitate improvement in function. NEW     Discharge goals: Time frame: 6 weeks   # Goal Status   1 Pt will be able to elevate UE to at least 140° without symptoms in order to participate in ADLs such as reaching overhead and pulling shirt on. NEW   2 Pt will be able to reach over head to touch opposite scapula without symptoms to be able to participate in ADLs such as reaching overhead and washing head. NEW   3 Pt will be able to reach across body to touch spine of opposite scapula without symptoms to be able to participate in ADLs such as reaching tasks. NEW   4 Pt will be able to reach behind back to touch TLJ without symptoms to be able to participate in dressing and toileting activities. NEW   5 Pt will have at least 125° of hip flexion to be able to put on socks/shoes and get in/out of the tub. NEW       Rehabilitation Potential For Stated Goals: Good  Regarding Juwan Roberto therapy, I certify that the treatment plan above will be carried out by a therapist or under their direction.   Thank you for this referral,  Orquidea Sheriff, PT, DPT     Referring Physician Signature: Bruno Jasso MD _______________________________ Date _____________     HISTORY:   History of Injury/Illness (Reason for Referral):  Pt reports she was playing [employer] ball in [place] on 11/2/20 when she went back to get a ball and hit a post and then fell. She had fractured her R humerus and femur. She required surgery to fixate R hip and R humerus healed without surgery, just using sling. She had PT while down in [place] and she reports she responded well to it. She still has limited ROM, especially with reaching behind back or across body using R shoulder. She reports hip also still aches. She would like to get back to normal ROM of R shoulder and strengthen R hip. She denies radicular symptoms in R UE or LE. Past Medical History/Comorbidities:   Ms. Charis Alva  has no past medical history on file. Ms. Charis Alva  has no past surgical history on file. High cholesterol and hypothyroidism  Social History/Living Environment:     Pt lives with spouse/significant other in two-story home with stairs in home. Prior Level of Function/Work/Activity:  Pt is retired . Pt had unrestricted prior level of function. Current Medications:     Levothyroxine, Rosuvastatin, aspirin, B12    Date Last Reviewed:  4/8/21   Number of Personal Factors/Comorbidities that affect the Plan of Care: 1-2: MODERATE COMPLEXITY   EXAMINATION:   Pain at worst: 4/10  Observation: Gait: no compensations noted  ROM:    Left Right   Elevation (°) 152 130 with pain and tightness   Behind neck reach To opposite scapula To upper back   Behind back reach To opposite scapula To lumbar region with pain   Cross body reach To opposite scapula To lateral aspect of scapula with pain   R ER WFL but IR limited to ~30 ° per visual estimation. R hip flexion limited to ~90 ° per visual estimation and R hip IR WFL but tight and stiff at end range. UE ANDT: All within normal limits B  Lower body ANDT: All tests WNL B  Strength: 5/5 throughout B shoulders. R hip abduction and reverse clam fatigued quickly in set of 10 reps   Body Structures Involved:  1. Joints  2.  Muscles Body Functions Affected:  1. Sensory/Pain  2. Neuromusculoskeletal  3. Movement Related Activities and Participation Affected:  1. General Tasks and Demands  2. Mobility  3. Self Care  4. Community, Social and [place]   Number of elements (examined above) that affect the Plan of Care: 3: MODERATE COMPLEXITY   CLINICAL PRESENTATION:   Presentation: Evolving clinical presentation with changing clinical characteristics: MODERATE COMPLEXITY     CLINICAL DECISION MAKING:      OUTCOME MEASURE:   Initial outcome measure performed on 4/8/2021. Tool Used: Disabilities of the Arm, Shoulder and Hand (DASH) Questionnaire - Quick Version  Score:  Initial: 46/55  Most Recent: X/55 (Date: -- )   Interpretation of Score: The DASH is designed to measure the activities of daily living in person's with upper extremity dysfunction or pain. Each section is scored on a 1-5 scale, 5 representing the greatest disability. The scores of each section are added together for a total score of 55. MEDICAL NECESSITY:   · Patient will benefit from skilled physical therapy to address their previously listed impairments in order to improve their independence with functional activities painfree. REASON FOR SERVICES/OTHER COMMENTS:  · Patient requires present interventions due to patient's inability to perform functional and recreational activities painfree.    Use of outcome tool(s) and clinical judgement create a POC that gives a: Questionable prediction of patient's progress: MODERATE COMPLEXITY        Total Duration: 45 min  PT Patient Time In/Time Out  Time In: 1530  Time Out: [address], PT, DPT

## 2021-04-13 ENCOUNTER — HOSPITAL ENCOUNTER (OUTPATIENT)
Dept: PHYSICAL THERAPY | Age: 71
Discharge: HOME | End: 2021-04-13
Payer: MEDICARE

## 2021-04-13 PROCEDURE — 97110 THERAPEUTIC EXERCISES: CPT

## 2021-04-13 NOTE — PROGRESS NOTES
Husam Jimenez  : 1950  [place]  [place] Annabella Brown 25 @ [address], [address] [place] [address]  [phone]   [phone]      OUTPATIENT PHYSICAL THERAPY: Daily Treatment Note 2021  Date of Onset: 21  Visit Count:  2    ICD-10: Treatment Diagnosis: Pain in right shoulder (M25.511) and Stiffness of right shoulder, not elsewhere classified (M25.611) and Pain in right hip (M25.551) and Stiffness of right hip, not elsewhere classified (M25.651)  TREATMENT PLAN:  Effective Dates: 2021 TO 21. Frequency/Duration: 2 time a week for 6 weeks. GOALS: (Goals have been discussed and agreed upon with patient.)  Short-Term Functional Goals: Time Frame: 4 weeks  # Goal Status   1 Pt will confirm compliance with home program to facilitate improvement in function. NEW       Discharge goals: Time frame: 6 weeks   # Goal Status   1 Pt will be able to elevate UE to at least 140° without symptoms in order to participate in ADLs such as reaching overhead and pulling shirt on. NEW   2 Pt will be able to reach over head to touch opposite scapula without symptoms to be able to participate in ADLs such as reaching overhead and washing head. NEW   3 Pt will be able to reach across body to touch spine of opposite scapula without symptoms to be able to participate in ADLs such as reaching tasks. NEW   4 Pt will be able to reach behind back to touch TLJ without symptoms to be able to participate in dressing and toileting activities. NEW   5 Pt will have at least 125° of hip flexion to be able to put on socks/shoes and get in/out of the tub. NEW       Pre-treatment Symptoms/Complaints:  Pt reports she has been working on her stretches at home.    Pain: Initial:   Pt reports she's feeling fine this morning Post Session:  No increase in pain   Medications Last Reviewed: 2021  Updated Objective Findings: Below measures from initial evaluation unless otherwise noted. 4/8/21  Pain at worst: 4/10  Observation: Gait: no compensations noted  ROM:    Left Right   Elevation (°) 152 130 with pain and tightness   Behind neck reach To opposite scapula To upper back   Behind back reach To opposite scapula To lumbar region with pain   Cross body reach To opposite scapula To lateral aspect of scapula with pain   R ER WFL but IR limited to ~30 ° per visual estimation. R hip flexion limited to ~90 ° per visual estimation and R hip IR WFL but tight and stiff at end range. UE ANDT: All within normal limits B  Lower body ANDT: All tests WNL B  Strength: 5/5 throughout B shoulders. R hip abduction and reverse clam fatigued quickly in set of 10 reps  DASH: 46/55    TREATMENT:   THERAPEUTIC ACTIVITY: (see chart for minutes): Therapeutic activities per grid below to improve mobility, strength, balance and coordination. Required minimal visual, verbal and tactile cues to improve independence with functional mobility and activities. THERAPEUTIC EXERCISE: (see chart for minutes):  Exercises per grid below to improve mobility, strength, balance and coordination. Required minimal visual, verbal and tactile cues to promote proper body alignment and promote proper body mechanics. Progressed resistance, range, repetitions and complexity of movement as indicated. NEUROMUSCULAR RE-EDUCATION: (see chart for minutes):  Exercise/activities per grid below to improve balance, coordination, kinesthetic sense, posture, pain, and proprioception. Required minimal visual, verbal and tactile cues to promote static and dynamic balance in standing. MANUAL THERAPY: (see chart for minutes): Joint mobilization, Soft tissue mobilization, skin mobilization, and muscle energy techniques were utilized and necessary because of the patient's restricted joint motion, restricted motion of soft tissue and pain .    MODALITIES: (see chart for minutes):      *  Cold Pack Therapy in order to provide analgesia. Date: 4/8/21 (visit 1) 4/13/21 (visit 2)       Modalities:                Therapeutic Exercise: 30 min 43 min       S/L ER: x5 R  Doorway stretch: x2 holds   Horizontal adduction with IR force: x3 R   Sleeper stretch: x2 holds R  Clam: x10 R  Reverse clam: x10 R  Hip abduction: x10 R   SKTC: x1 hold R  Reviewed pt's previous home program and educated her on continuing band ER and band flexion for shoulder. Pt educated on home program implementation and given printout. Bike with using handles: 5'   Wall stretch: 2x30 SH flexion, 2x30 SH abduction, 30 SH elbow bent  Cross body stretch: x4 holds with IR pressure  Band pull-apart: 2x10 red  Lift off: x10 R  Child's pose: x3 holds   SKTC: x3 holds   Seated forward flexion stretch: x3 holds   Reverse clam: x10  Hip abduction: x10 R   SL RDL: x2 B       Neuromuscular re-education                Manual Therapy:                Therapeutic Activities:                  Home program: 4/8/21: SKTC, reverse clam, S/L hip abduction, cross body shoulder stretch, sleeper stretch, doorway stretch  4/13/21: wall stretching, band pull-apart, child's pose, lift off, hip flexion stretches, SL RDL    MedBridge Portal  Treatment/Session Summary:    · Response to Treatment: Pt challenged with advanced shoulder and hip stretches. She tolerated them well and reported she felt looser afterwards. She was given updated home program to work on. · Communication/Consultation:  None today  · Equipment provided today: None  · Recommendations/Intent for next treatment session: Next visit will focus on improving R shoulder flexion and IR and R hip flexion ROM and hip abduction strength.     Total Treatment Billable Duration:  43 minutes  PT Patient Time In/Time Out  Time In: 0930  Time Out: 911 Cassandra Childs, PT, DPT    Future Appointments   Date Time Provider Carleen Jenkins   4/16/2021 11:00 AM Gildardo Denise [facility]   4/20/2021  9:30 AM Gildardo Denise [facility]   4/23/2021 11:00 AM Meridee Ou [facility]   4/27/2021  9:30 AM Meridee Ou [facility]   4/29/2021 11:00 AM Meridee Ou [facility]

## 2021-04-15 ENCOUNTER — APPOINTMENT (OUTPATIENT)
Dept: PHYSICAL THERAPY | Age: 71
End: 2021-04-15
Payer: MEDICARE

## 2021-04-16 ENCOUNTER — HOSPITAL ENCOUNTER (OUTPATIENT)
Dept: PHYSICAL THERAPY | Age: 71
Discharge: HOME | End: 2021-04-16
Payer: MEDICARE

## 2021-04-16 PROCEDURE — 97110 THERAPEUTIC EXERCISES: CPT

## 2021-04-16 NOTE — PROGRESS NOTES
Angel Branch  : 1950  [place]  [place]: [place] Annabella Brown [address] @ [address] [place].  [phone]   [phone]      OUTPATIENT PHYSICAL THERAPY: Daily Treatment Note 2021  Date of Onset: 21  Visit Count:  3    ICD-10: Treatment Diagnosis: Pain in right shoulder (M25.511) and Stiffness of right shoulder, not elsewhere classified (M25.611) and Pain in right hip (M25.551) and Stiffness of right hip, not elsewhere classified (M25.651)  TREATMENT PLAN:  Effective Dates: 2021 TO 21. Frequency/Duration: 2 time a week for 6 weeks. GOALS: (Goals have been discussed and agreed upon with patient.)  Short-Term Functional Goals: Time Frame: 4 weeks  # Goal Status   1 Pt will confirm compliance with home program to facilitate improvement in function. NEW       Discharge goals: Time frame: 6 weeks   # Goal Status   1 Pt will be able to elevate UE to at least 140° without symptoms in order to participate in ADLs such as reaching overhead and pulling shirt on. NEW   2 Pt will be able to reach over head to touch opposite scapula without symptoms to be able to participate in ADLs such as reaching overhead and washing head. NEW   3 Pt will be able to reach across body to touch spine of opposite scapula without symptoms to be able to participate in ADLs such as reaching tasks. NEW   4 Pt will be able to reach behind back to touch TLJ without symptoms to be able to participate in dressing and toileting activities. NEW   5 Pt will have at least 125° of hip flexion to be able to put on socks/shoes and get in/out of the tub. NEW       Pre-treatment Symptoms/Complaints:  Pt reports she has been working on all of her exercises and reports she has made progress with reaching behind her back.    Pain: Initial:   No pain Post Session:  No increase in pain   Medications Last Reviewed: 2021  Updated Objective Findings: Below measures from initial evaluation unless otherwise noted. 4/8/21  Pain at worst: 4/10  Observation: Gait: no compensations noted  ROM:    Left Right   Elevation (°) 152 130 with pain and tightness   Behind neck reach To opposite scapula To upper back   Behind back reach To opposite scapula To lumbar region with pain   Cross body reach To opposite scapula To lateral aspect of scapula with pain   R ER WFL but IR limited to ~30 ° per visual estimation. R hip flexion limited to ~90 ° per visual estimation and R hip IR WFL but tight and stiff at end range. UE ANDT: All within normal limits B  Lower body ANDT: All tests WNL B  Strength: 5/5 throughout B shoulders. R hip abduction and reverse clam fatigued quickly in set of 10 reps  DASH: 46/55    TREATMENT:   THERAPEUTIC ACTIVITY: (see chart for minutes): Therapeutic activities per grid below to improve mobility, strength, balance and coordination. Required minimal visual, verbal and tactile cues to improve independence with functional mobility and activities. THERAPEUTIC EXERCISE: (see chart for minutes):  Exercises per grid below to improve mobility, strength, balance and coordination. Required minimal visual, verbal and tactile cues to promote proper body alignment and promote proper body mechanics. Progressed resistance, range, repetitions and complexity of movement as indicated. NEUROMUSCULAR RE-EDUCATION: (see chart for minutes):  Exercise/activities per grid below to improve balance, coordination, kinesthetic sense, posture, pain, and proprioception. Required minimal visual, verbal and tactile cues to promote static and dynamic balance in standing. MANUAL THERAPY: (see chart for minutes): Joint mobilization, Soft tissue mobilization, skin mobilization, and muscle energy techniques were utilized and necessary because of the patient's restricted joint motion, restricted motion of soft tissue and pain .    MODALITIES: (see chart for minutes):      *  Cold Pack Therapy in order to provide analgesia. Date: 4/8/21 (visit 1) 4/13/21 (visit 2)  4/16/21 (visit 3)      Modalities:                Therapeutic Exercise: 30 min 43 min 45 min      S/L ER: x5 R  Doorway stretch: x2 holds   Horizontal adduction with IR force: x3 R   Sleeper stretch: x2 holds R  Clam: x10 R  Reverse clam: x10 R  Hip abduction: x10 R   SKTC: x1 hold R  Reviewed pt's previous home program and educated her on continuing band ER and band flexion for shoulder. Pt educated on home program implementation and given printout. Bike with using handles: 5'   Wall stretch: 2x30 SH flexion, 2x30 SH abduction, 30 SH elbow bent  Cross body stretch: x4 holds with IR pressure  Band pull-apart: 2x10 red  Lift off: x10 R  Child's pose: x3 holds   SKTC: x3 holds   Seated forward flexion stretch: x3 holds   Reverse clam: x10  Hip abduction: x10 R   SL RDL: x2 B  Bike with using handles: 5'   Wall stretch: 2x30 SH flexion, 2x30 SH abduction  Cross body stretch: x1' holds with IR pressure, x2 holds in higher position  Band pull-apart: x8 blue  Lift off: x10 R  Child's pose: x2 holds   SKTC: 3x30 SH with clinician using belt to perform inferior glide which relieved pt's symptoms  Reverse clam: x10 R  Hip abduction: x10 B  Side plank from knees: x5 B, x5 with 3 reps of clam  S/L abduction with belt used to hold shoulder blade down: x5     Neuromuscular re-education                Manual Therapy:                Therapeutic Activities:                  Home program: 4/8/21: SKTC, reverse clam, S/L hip abduction, cross body shoulder stretch, sleeper stretch, doorway stretch  4/13/21: wall stretching, band pull-apart, child's pose, lift off, hip flexion stretches, SL RDL    Med[employer] Portal  Treatment/Session Summary:    · Response to Treatment: Pt is making improvements in both R shoulder and R hip ROM. She was educated on updating her exercises and given written notes on these. · Communication/Consultation:  None today  · Equipment provided today: None  · Recommendations/Intent for next treatment session: Next visit will focus on improving R shoulder flexion and IR and R hip flexion ROM and hip abduction strength.     Total Treatment Billable Duration:  45 minutes  PT Patient Time In/Time Out  Time In: 1100  Time Out: [facility], PT, DPT    Future Appointments   Date Time Provider Carleen Jenkins   4/20/2021  9:30 AM Chelita Guevara [facility]   4/23/2021 11:00 AM Berkleyphoebe Almaguercher [facility]   4/27/2021  9:30 AM Chelita Almaguercher [facility]   4/29/2021 11:00 AM Chelita Almaguercher [facility]   5/4/2021 11:00 AM Berkley Rancher [facility]   5/7/2021 11:00 AM Berkleyphoebe Almaguercher [facility]   5/11/2021 11:00 AM Chelita Almaguercher [facility]   5/14/2021 11:00 AM Berkley Rancher [facility]   5/18/2021 11:00 AM Berkleyphoebe Almaguercher [facility]   5/21/2021 11:00 AM Berkleyphoebe Almaguercher [facility]   5/25/2021 11:00 AM Berkleyphoebe Almaguercher [facility]

## 2021-04-19 ENCOUNTER — APPOINTMENT (OUTPATIENT)
Dept: PHYSICAL THERAPY | Age: 71
End: 2021-04-19
Payer: MEDICARE

## 2021-04-20 ENCOUNTER — HOSPITAL ENCOUNTER (OUTPATIENT)
Dept: PHYSICAL THERAPY | Age: 71
Discharge: HOME | End: 2021-04-20
Payer: MEDICARE

## 2021-04-20 PROCEDURE — 97110 THERAPEUTIC EXERCISES: CPT

## 2021-04-20 NOTE — PROGRESS NOTES
Maribel Horta  : 1950  [place]  [place]: [place] Annabella Brown 25 @ [address] [address].  [phone]   [phone]      OUTPATIENT PHYSICAL THERAPY: Daily Treatment Note 2021  Date of Onset: 21  Visit Count:  4    ICD-10: Treatment Diagnosis: Pain in right shoulder (M25.511) and Stiffness of right shoulder, not elsewhere classified (M25.611) and Pain in right hip (M25.551) and Stiffness of right hip, not elsewhere classified (M25.651)  TREATMENT PLAN:  Effective Dates: 2021 TO 21. Frequency/Duration: 2 time a week for 6 weeks. GOALS: (Goals have been discussed and agreed upon with patient.)  Short-Term Functional Goals: Time Frame: 4 weeks  # Goal Status   1 Pt will confirm compliance with home program to facilitate improvement in function. NEW       Discharge goals: Time frame: 6 weeks   # Goal Status   1 Pt will be able to elevate UE to at least 140° without symptoms in order to participate in ADLs such as reaching overhead and pulling shirt on. NEW   2 Pt will be able to reach over head to touch opposite scapula without symptoms to be able to participate in ADLs such as reaching overhead and washing head. NEW   3 Pt will be able to reach across body to touch spine of opposite scapula without symptoms to be able to participate in ADLs such as reaching tasks. NEW   4 Pt will be able to reach behind back to touch TLJ without symptoms to be able to participate in dressing and toileting activities. NEW   5 Pt will have at least 125° of hip flexion to be able to put on socks/shoes and get in/out of the tub. NEW       Pre-treatment Symptoms/Complaints:  Pt reports she can tell her R shoulder is improving. She reports it hurts less during the day and at night as well as being more flexible. She reports her R hip has been sore which may be due to working in yard.    Pain: Initial:   Pt reports her R hip is sore and feels very tight Post Session:  Pt reports mild soreness in R hip but did not rate pain   Medications Last Reviewed: 4/20/2021  Updated Objective Findings: Below measures from initial evaluation unless otherwise noted. 4/8/21  Pain at worst: 4/10  Observation: Gait: no compensations noted  ROM:    Left Right   Elevation (°) 152 130 with pain and tightness   Behind neck reach To opposite scapula To upper back   Behind back reach To opposite scapula To lumbar region with pain   Cross body reach To opposite scapula To lateral aspect of scapula with pain   R ER WFL but IR limited to ~30 ° per visual estimation. R hip flexion limited to ~90 ° per visual estimation and R hip IR WFL but tight and stiff at end range. UE ANDT: All within normal limits B  Lower body ANDT: All tests WNL B  Strength: 5/5 throughout B shoulders. R hip abduction and reverse clam fatigued quickly in set of 10 reps  DASH: 46/55    TREATMENT:   THERAPEUTIC ACTIVITY: (see chart for minutes): Therapeutic activities per grid below to improve mobility, strength, balance and coordination. Required minimal visual, verbal and tactile cues to improve independence with functional mobility and activities. THERAPEUTIC EXERCISE: (see chart for minutes):  Exercises per grid below to improve mobility, strength, balance and coordination. Required minimal visual, verbal and tactile cues to promote proper body alignment and promote proper body mechanics. Progressed resistance, range, repetitions and complexity of movement as indicated. NEUROMUSCULAR RE-EDUCATION: (see chart for minutes):  Exercise/activities per grid below to improve balance, coordination, kinesthetic sense, posture, pain, and proprioception. Required minimal visual, verbal and tactile cues to promote static and dynamic balance in standing.   MANUAL THERAPY: (see chart for minutes): Joint mobilization, Soft tissue mobilization, skin mobilization, and muscle energy techniques were utilized and necessary because of the patient's restricted joint motion, restricted motion of soft tissue and pain . MODALITIES: (see chart for minutes):      *  Cold Pack Therapy in order to provide analgesia. Date: 4/8/21 (visit 1) 4/13/21 (visit 2)  4/16/21 (visit 3)  4/20/21 (visit 4)     Modalities:                Therapeutic Exercise: 30 min 43 min 45 min 45 min     S/L ER: x5 R  Doorway stretch: x2 holds   Horizontal adduction with IR force: x3 R   Sleeper stretch: x2 holds R  Clam: x10 R  Reverse clam: x10 R  Hip abduction: x10 R   SKTC: x1 hold R  Reviewed pt's previous home program and educated her on continuing band ER and band flexion for shoulder. Pt educated on home program implementation and given printout.  Bike with using handles: 5'   Wall stretch: 2x30 SH flexion, 2x30 SH abduction, 30 SH elbow bent  Cross body stretch: x4 holds with IR pressure  Band pull-apart: 2x10 red  Lift off: x10 R  Child's pose: x3 holds   SKTC: x3 holds   Seated forward flexion stretch: x3 holds   Reverse clam: x10  Hip abduction: x10 R   SL RDL: x2 B  Bike with using handles: 5'   Wall stretch: 2x30 SH flexion, 2x30 SH abduction  Cross body stretch: x1' holds with IR pressure, x2 holds in higher position  Band pull-apart: x8 blue  Lift off: x10 R  Child's pose: x2 holds   SKTC: 3x30 SH with clinician using belt to perform inferior glide which relieved pt's symptoms  Reverse clam: x10 R  Hip abduction: x10 B  Side plank from knees: x5 B, x5 with 3 reps of clam  S/L abduction with belt used to hold shoulder blade down: x5 Bike with using handles: 5'   Wall stretch: 3x30 SH flexion, x30 then 1' SH abduction  Cross body stretch: x1 hold  Behind back stretch with strap: 2x30 SH   SKTC: 2x30 SH with clinician using belt to perform inferior glide   LTR with legs crossed: 2x3 B   Cross body stretch with trunk rotation: x5 R   Standing hip hike: x8, x10 B  SL RDL: x3 B  Pt educated on squatting with more hip abduction which made it significantly easier. Pt given updated home program printout. Neuromuscular re-education                Manual Therapy:                Therapeutic Activities:                  Home program: 4/8/21: Riaz Chalk, reverse clam, S/L hip abduction, cross body shoulder stretch, sleeper stretch, doorway stretch  4/13/21: wall stretching, band pull-apart, child's pose, lift off, hip flexion stretches, SL RDL  4/20/21: LTR with legs crossed, cross body stretch with trunk rotation, hip hike     Med[facility] Portal  Treatment/Session Summary:    · Response to Treatment: Pt demonstrates improving R shoulder ROM and was advanced to behind back stretch. She was also educated to work on hip adduction stretch. · Communication/Consultation:  None today  · Equipment provided today: None  · Recommendations/Intent for next treatment session: Next visit will focus on improving R shoulder flexion and IR and R hip flexion ROM and hip abduction strength.     Total Treatment Billable Duration:  45 minutes  PT Patient Time In/Time Out  Time In: 0930  Time Out:  [address], PT, DPT    Future Appointments   Date Time Provider Carleen Jenkins   4[facility]23[facility]2021 [facility]   4/27/2021  9:30 AM Alfreida Pantoja [facility]   4/29/2021 11:00 AM Alfreida Pantoja [facility]   5/4/2021 11:00 AM Alfreida Pantoja [facility]   5/7/2021 11:00 AM Alfreida Pantoja [facility]   5/11/2021 11:00 AM Alfreida Pantoja [facility]   5/14/2021 11:00 AM Alfreida Pantoja [facility]   5/18/2021 11:00 AM Alfreida Pantoja [facility]   5/21/2021 11:00 AM Alfreida Pantoja [facility]   5/25/2021 11:00 AM Alfreida Pantoja [facility]

## 2021-04-22 ENCOUNTER — APPOINTMENT (OUTPATIENT)
Dept: PHYSICAL THERAPY | Age: 71
End: 2021-04-22
Payer: MEDICARE

## 2021-04-23 ENCOUNTER — HOSPITAL ENCOUNTER (OUTPATIENT)
Dept: PHYSICAL THERAPY | Age: 71
Discharge: HOME | End: 2021-04-23
Payer: MEDICARE

## 2021-04-23 PROCEDURE — 97110 THERAPEUTIC EXERCISES: CPT

## 2021-04-23 NOTE — PROGRESS NOTES
Ramon Mckeon  : 1950  [place]  [place]: [place] Annabella Brown [address] @ [address] [place] [address].  [phone]   [phone]      OUTPATIENT PHYSICAL THERAPY: Daily Treatment Note 2021  Date of Onset: 21  Visit Count:  5    ICD-10: Treatment Diagnosis: Pain in right shoulder (M25.511) and Stiffness of right shoulder, not elsewhere classified (M25.611) and Pain in right hip (M25.551) and Stiffness of right hip, not elsewhere classified (M25.651)  TREATMENT PLAN:  Effective Dates: 2021 TO 21. Frequency/Duration: 2 time a week for 6 weeks. GOALS: (Goals have been discussed and agreed upon with patient.)  Short-Term Functional Goals: Time Frame: 4 weeks  # Goal Status   1 Pt will confirm compliance with home program to facilitate improvement in function. NEW       Discharge goals: Time frame: 6 weeks   # Goal Status   1 Pt will be able to elevate UE to at least 140° without symptoms in order to participate in ADLs such as reaching overhead and pulling shirt on. NEW   2 Pt will be able to reach over head to touch opposite scapula without symptoms to be able to participate in ADLs such as reaching overhead and washing head. NEW   3 Pt will be able to reach across body to touch spine of opposite scapula without symptoms to be able to participate in ADLs such as reaching tasks. NEW   4 Pt will be able to reach behind back to touch TLJ without symptoms to be able to participate in dressing and toileting activities. NEW   5 Pt will have at least 125° of hip flexion to be able to put on socks/shoes and get in/out of the tub. NEW       Pre-treatment Symptoms/Complaints:  Pt reports that she has been working on updated exercises. She reports shoulder feels significantly better.    Pain: Initial:   Pt reports her R hip is sore today from working in her garden Post Session:  No increase in pain   Medications Last Reviewed: 4/23/2021  Updated Objective Findings: Below measures from initial evaluation unless otherwise noted. 4/8/21  Pain at worst: 4/10  Observation: Gait: no compensations noted  ROM:    Left Right   Elevation (°) 152 130 with pain and tightness   Behind neck reach To opposite scapula To upper back   Behind back reach To opposite scapula To lumbar region with pain   Cross body reach To opposite scapula To lateral aspect of scapula with pain   R ER WFL but IR limited to ~30 ° per visual estimation. R hip flexion limited to ~90 ° per visual estimation and R hip IR WFL but tight and stiff at end range. UE ANDT: All within normal limits B  Lower body ANDT: All tests WNL B  Strength: 5/5 throughout B shoulders. R hip abduction and reverse clam fatigued quickly in set of 10 reps  DASH: 46/55    TREATMENT:   THERAPEUTIC ACTIVITY: (see chart for minutes): Therapeutic activities per grid below to improve mobility, strength, balance and coordination. Required minimal visual, verbal and tactile cues to improve independence with functional mobility and activities. THERAPEUTIC EXERCISE: (see chart for minutes):  Exercises per grid below to improve mobility, strength, balance and coordination. Required minimal visual, verbal and tactile cues to promote proper body alignment and promote proper body mechanics. Progressed resistance, range, repetitions and complexity of movement as indicated. NEUROMUSCULAR RE-EDUCATION: (see chart for minutes):  Exercise/activities per grid below to improve balance, coordination, kinesthetic sense, posture, pain, and proprioception. Required minimal visual, verbal and tactile cues to promote static and dynamic balance in standing.   MANUAL THERAPY: (see chart for minutes): Joint mobilization, Soft tissue mobilization, skin mobilization, and muscle energy techniques were utilized and necessary because of the patient's restricted joint motion, restricted motion of soft tissue and pain .   MODALITIES: (see chart for minutes):      *  Cold Pack Therapy in order to provide analgesia. Date: 4/8/21 (visit 1) 4/13/21 (visit 2)  4/16/21 (visit 3)  4/20/21 (visit 4)  4/23/21 (visit 5)    Modalities:                Therapeutic Exercise: 30 min 43 min 45 min 45 min 45 min    S/L ER: x5 R  Doorway stretch: x2 holds   Horizontal adduction with IR force: x3 R   Sleeper stretch: x2 holds R  Clam: x10 R  Reverse clam: x10 R  Hip abduction: x10 R   SKTC: x1 hold R  Reviewed pt's previous home program and educated her on continuing band ER and band flexion for shoulder. Pt educated on home program implementation and given printout. Bike with using handles: 5'   Wall stretch: 2x30 SH flexion, 2x30 SH abduction, 30 SH elbow bent  Cross body stretch: x4 holds with IR pressure  Band pull-apart: 2x10 red  Lift off: x10 R  Child's pose: x3 holds   SKTC: x3 holds   Seated forward flexion stretch: x3 holds   Reverse clam: x10  Hip abduction: x10 R   SL RDL: x2 B  Bike with using handles: 5'   Wall stretch: 2x30 SH flexion, 2x30 SH abduction  Cross body stretch: x1' holds with IR pressure, x2 holds in higher position  Band pull-apart: x8 blue  Lift off: x10 R  Child's pose: x2 holds   SKTC: 3x30 SH with clinician using belt to perform inferior glide which relieved pt's symptoms  Reverse clam: x10 R  Hip abduction: x10 B  Side plank from knees: x5 B, x5 with 3 reps of clam  S/L abduction with belt used to hold shoulder blade down: x5 Bike with using handles: 5'   Wall stretch: 3x30 SH flexion, x30 then 1' SH abduction  Cross body stretch: x1 hold  Behind back stretch with strap: 2x30 SH   SKTC: 2x30 SH with clinician using belt to perform inferior glide   LTR with legs crossed: 2x3 B   Cross body stretch with trunk rotation: x5 R   Standing hip hike: x8, x10 B  SL RDL: x3 B  Pt educated on squatting with more hip abduction which made it significantly easier. Pt given updated home program printout.   Bike with using handles: 5'   Wall stretch: 2x1' flexion, x1' abduction  Cross body stretch: x11  Behind back stretch with strap: 2x1'  SKTC: 2x30 SH with clinician using belt to perform inferior glide   LTR with legs crossed: 2x3 B  Cross body stretch with trunk rotation: x5 R   Standing hip hike: x10 B   Neuromuscular re-education                Manual Therapy:                Therapeutic Activities:                  Home program: 4/8/21: Jaspreet Bird, huong pederson, S/L hip abduction, cross body shoulder stretch, sleeper stretch, doorway stretch  4/13/21: wall stretching, band pull-apart, child's pose, lift off, hip flexion stretches, SL RDL  4/20/21: LTR with legs crossed, cross body stretch with trunk rotation, hip hike     Med[facility] Portal  Treatment/Session Summary:    · Response to Treatment: Pt's home program was reviewed and exercises clarified. She confirmed understanding of them and would work on newest ones. · Communication/Consultation:  None today  · Equipment provided today: None  · Recommendations/Intent for next treatment session: Next visit will focus on improving R shoulder flexion and IR and R hip flexion ROM and hip abduction strength.     Total Treatment Billable Duration:  45 minutes  PT Patient Time In/Time Out  Time In: 1100  Time Out: [address] [facility] Ramiro Rainey PT, DPT    Future Appointments   Date Time Provider Carleen Jenkins   4/27/2021  9:30 AM Gerardo Credit [facility]   4/29/2021 11:00 AM Gerardo Credit [facility]   5/4/2021 11:00 AM Gerardo Credit [facility]   5/7/2021 11:00 AM Gerardo Credit [facility]   5/11/2021 11:00 AM Gerardo Credit [facility]   5/14/2021  9:30 AM SABRINA DempseyOFR [facility]   5/18/2021 11:00 AM Gerardo Credit [facility]   5/21/2021 11:00 AM Gerardo Credit [facility]   5/25/2021  2:00 PM Gerardo Credit [facility]

## 2021-04-27 ENCOUNTER — HOSPITAL ENCOUNTER (OUTPATIENT)
Dept: PHYSICAL THERAPY | Age: 71
Discharge: HOME | End: 2021-04-27
Payer: MEDICARE

## 2021-04-27 PROCEDURE — 97110 THERAPEUTIC EXERCISES: CPT

## 2021-04-27 NOTE — PROGRESS NOTES
Hadley Grant  : 1950  [place]  [place]: [place] Annabella Brown [address] @ [address], [address] [place] [address]  [phone]   [phone]      OUTPATIENT PHYSICAL THERAPY: Daily Treatment Note 2021  Date of Onset: 21  Visit Count:  6    ICD-10: Treatment Diagnosis: Pain in right shoulder (M25.511) and Stiffness of right shoulder, not elsewhere classified (M25.611) and Pain in right hip (M25.551) and Stiffness of right hip, not elsewhere classified (M25.651)  TREATMENT PLAN:  Effective Dates: 2021 TO 21. Frequency/Duration: 2 time a week for 6 weeks. GOALS: (Goals have been discussed and agreed upon with patient.)  Short-Term Functional Goals: Time Frame: 4 weeks  # Goal Status   1 Pt will confirm compliance with home program to facilitate improvement in function. NEW       Discharge goals: Time frame: 6 weeks   # Goal Status   1 Pt will be able to elevate UE to at least 140° without symptoms in order to participate in ADLs such as reaching overhead and pulling shirt on. NEW   2 Pt will be able to reach over head to touch opposite scapula without symptoms to be able to participate in ADLs such as reaching overhead and washing head. NEW   3 Pt will be able to reach across body to touch spine of opposite scapula without symptoms to be able to participate in ADLs such as reaching tasks. NEW   4 Pt will be able to reach behind back to touch TLJ without symptoms to be able to participate in dressing and toileting activities. NEW   5 Pt will have at least 125° of hip flexion to be able to put on socks/shoes and get in/out of the tub. NEW       Pre-treatment Symptoms/Complaints:  Pt reports her shoulder is much better and she still has occasional tightness but it continues to improve. Her hip is now primarily what limits her.    Pain: Initial:   Pt reports she is a little sore from all the yard work she did yesterday Post Session:  No increase    Medications Last Reviewed: 4/27/2021  Updated Objective Findings: Below measures from initial evaluation unless otherwise noted. 4/8/21  Pain at worst: 4/10  Observation: Gait: no compensations noted  ROM:    Left Right   Elevation (°) 152 130 with pain and tightness   Behind neck reach To opposite scapula To upper back   Behind back reach To opposite scapula To lumbar region with pain   Cross body reach To opposite scapula To lateral aspect of scapula with pain   R ER WFL but IR limited to ~30 ° per visual estimation. R hip flexion limited to ~90 ° per visual estimation and R hip IR WFL but tight and stiff at end range. UE ANDT: All within normal limits B  Lower body ANDT: All tests WNL B  Strength: 5/5 throughout B shoulders. R hip abduction and reverse clam fatigued quickly in set of 10 reps  DASH: 46/55    TREATMENT:   THERAPEUTIC ACTIVITY: (see chart for minutes): Therapeutic activities per grid below to improve mobility, strength, balance and coordination. Required minimal visual, verbal and tactile cues to improve independence with functional mobility and activities. THERAPEUTIC EXERCISE: (see chart for minutes):  Exercises per grid below to improve mobility, strength, balance and coordination. Required minimal visual, verbal and tactile cues to promote proper body alignment and promote proper body mechanics. Progressed resistance, range, repetitions and complexity of movement as indicated. NEUROMUSCULAR RE-EDUCATION: (see chart for minutes):  Exercise/activities per grid below to improve balance, coordination, kinesthetic sense, posture, pain, and proprioception. Required minimal visual, verbal and tactile cues to promote static and dynamic balance in standing.   MANUAL THERAPY: (see chart for minutes): Joint mobilization, Soft tissue mobilization, skin mobilization, and muscle energy techniques were utilized and necessary because of the patient's restricted joint motion, restricted motion of soft tissue and pain . MODALITIES: (see chart for minutes):      *  Cold Pack Therapy in order to provide analgesia. Date: 4/23/21 (visit 5)  4/27/21 (visit 6)       Modalities:                Therapeutic Exercise: 45 min 45 min       Bike with using handles: 5'   Wall stretch: 2x1' flexion, x1' abduction  Cross body stretch: x1'  Behind back stretch with strap: 2x1'  SKTC: 2x30 SH with clinician using belt to perform inferior glide   LTR with legs crossed: 2x3 B  Cross body stretch with trunk rotation: x5 R   Standing hip hike: x10 B Bike with using handles: 5'   Wall stretch: x1' flexion, x1' abduction, x1' in ER  Behind back stretch with strap: x1'  Behind head stretch on wall: x1'   SKTC: x30 SH. Hip flexion active stretch against resistance: 2x5 R, x5 with strap  LTR with legs crossed: 2x3 B  Cross body stretch with trunk rotation: x5 R   Standing hip hike: x10 B      Neuromuscular re-education                Manual Therapy:                Therapeutic Activities:                  Home program: 4/8/21: Estle Sarks, reverse clam, S/L hip abduction, cross body shoulder stretch, sleeper stretch, doorway stretch  4/13/21: wall stretching, band pull-apart, child's pose, lift off, hip flexion stretches, SL RDL  4/20/21: LTR with legs crossed, cross body stretch with trunk rotation, hip hike     [facility] Portal  Treatment/Session Summary:    · Response to Treatment: Pt given behind head stretch on wall to continue improving R shoulder ROM. She was also instructed to work on active hip flexion stretch which she reported did feel better. · Communication/Consultation:  None today  · Equipment provided today: None  · Recommendations/Intent for next treatment session: Next visit will focus on improving R shoulder flexion and IR and R hip flexion ROM and hip abduction strength.     Total Treatment Billable Duration:  45 minutes  PT Patient Time In/Time Out  Time In: 0930  Time Out: 1015  Karen Pfeiffer Francisco Adan, PT, DPT    Future Appointments   Date Time Provider Carleen Alice   4/29/2021 11:00 AM Ej Mering [facility]   5/4/2021 11:00 AM Je Mering [facility]   5/7/2021 11:00 AM Ej Mering [facility]   5/11/2021 11:00 AM Ej Woodying [facility]   5/14/2021  9:30 AM Rosio Rojas DPT [facility]   5/18/2021 11:00 AM Ej Mering [facility]   5/21/2021 11:00 AM Ej Woodying [facility]   5/25/2021  2:00 PM Ej Rosales [facility]

## 2021-04-29 ENCOUNTER — APPOINTMENT (OUTPATIENT)
Dept: PHYSICAL THERAPY | Age: 71
End: 2021-04-29
Payer: MEDICARE

## 2021-04-29 ENCOUNTER — HOSPITAL ENCOUNTER (OUTPATIENT)
Dept: PHYSICAL THERAPY | Age: 71
Discharge: HOME | End: 2021-04-29
Payer: MEDICARE

## 2021-04-29 PROCEDURE — 97110 THERAPEUTIC EXERCISES: CPT

## 2021-04-29 NOTE — PROGRESS NOTES
Malena Lassiter  : 1950  [place]  [place] Annabella Brown 25 @ [place] [address].  [phone]   [phone]      OUTPATIENT PHYSICAL THERAPY: Daily Treatment Note 2021  Date of Onset: 21  Visit Count:  7    ICD-10: Treatment Diagnosis: Pain in right shoulder (M25.511) and Stiffness of right shoulder, not elsewhere classified (M25.611) and Pain in right hip (M25.551) and Stiffness of right hip, not elsewhere classified (M25.651)  TREATMENT PLAN:  Effective Dates: 2021 TO 21. Frequency/Duration: 2 time a week for 6 weeks. GOALS: (Goals have been discussed and agreed upon with patient.)  Short-Term Functional Goals: Time Frame: 4 weeks  # Goal Status   1 Pt will confirm compliance with home program to facilitate improvement in function. NEW       Discharge goals: Time frame: 6 weeks   # Goal Status   1 Pt will be able to elevate UE to at least 140° without symptoms in order to participate in ADLs such as reaching overhead and pulling shirt on. NEW   2 Pt will be able to reach over head to touch opposite scapula without symptoms to be able to participate in ADLs such as reaching overhead and washing head. NEW   3 Pt will be able to reach across body to touch spine of opposite scapula without symptoms to be able to participate in ADLs such as reaching tasks. NEW   4 Pt will be able to reach behind back to touch TLJ without symptoms to be able to participate in dressing and toileting activities. NEW   5 Pt will have at least 125° of hip flexion to be able to put on socks/shoes and get in/out of the tub. NEW       Pre-treatment Symptoms/Complaints:  Pt reports that she was pretty sore last night which she thinks is from a combination of working in yard and new stretch.    Pain: Initial:   Pt reports she is stiff from working in yard and new hip stretch Post Session:  \"I feel great\" Medications Last Reviewed: 4/29/2021  Updated Objective Findings: Below measures from initial evaluation unless otherwise noted. 4/8/21  Pain at worst: 4/10  Observation: Gait: no compensations noted  ROM:    Left Right   Elevation (°) 152 130 with pain and tightness   Behind neck reach To opposite scapula To upper back   Behind back reach To opposite scapula To lumbar region with pain   Cross body reach To opposite scapula To lateral aspect of scapula with pain   R ER WFL but IR limited to ~30 ° per visual estimation. R hip flexion limited to ~90 ° per visual estimation and R hip IR WFL but tight and stiff at end range. UE ANDT: All within normal limits B  Lower body ANDT: All tests WNL B  Strength: 5/5 throughout B shoulders. R hip abduction and reverse clam fatigued quickly in set of 10 reps  DASH: 46/55    TREATMENT:   THERAPEUTIC ACTIVITY: (see chart for minutes): Therapeutic activities per grid below to improve mobility, strength, balance and coordination. Required minimal visual, verbal and tactile cues to improve independence with functional mobility and activities. THERAPEUTIC EXERCISE: (see chart for minutes):  Exercises per grid below to improve mobility, strength, balance and coordination. Required minimal visual, verbal and tactile cues to promote proper body alignment and promote proper body mechanics. Progressed resistance, range, repetitions and complexity of movement as indicated. NEUROMUSCULAR RE-EDUCATION: (see chart for minutes):  Exercise/activities per grid below to improve balance, coordination, kinesthetic sense, posture, pain, and proprioception. Required minimal visual, verbal and tactile cues to promote static and dynamic balance in standing.   MANUAL THERAPY: (see chart for minutes): Joint mobilization, Soft tissue mobilization, skin mobilization, and muscle energy techniques were utilized and necessary because of the patient's restricted joint motion, restricted motion of soft tissue and pain . MODALITIES: (see chart for minutes):      *  Cold Pack Therapy in order to provide analgesia. Date: 4/23/21 (visit 5)  4/27/21 (visit 6)  4/29/21 (visit 7)      Modalities:                Therapeutic Exercise: 45 min 45 min 45 min      Bike with using handles: 5'   Wall stretch: 2x1' flexion, x1' abduction  Cross body stretch: x1'  Behind back stretch with strap: 2x1'  SKTC: 2x30 SH with clinician using belt to perform inferior glide   LTR with legs crossed: 2x3 B  Cross body stretch with trunk rotation: x5 R   Standing hip hike: x10 B Bike with using handles: 5'   Wall stretch: x1' flexion, x1' abduction, x1' in ER  Behind back stretch with strap: x1'  Behind head stretch on wall: x1'   SKTC: x30 SH. Hip flexion active stretch against resistance: 2x5 R, x5 with strap  LTR with legs crossed: 2x3 B  Cross body stretch with trunk rotation: x5 R   Standing hip hike: x10 B Bike with using handles: 5'   Wall stretch: x1' flexion, x1' abduction  Behind back stretch with strap: x1'  Behind head stretch on wall: x1'   Lift off: x10   SKTC: x30 SH. Hip flexion active stretch against resistance: x5 with strap  LTR with legs crossed: x5 B  Cross body stretch with trunk rotation: x3 R  Standing hip hike: x10 B  Bridge: x5, x10 SL B  Seated hip flexion stretch: x1'   Home program reviewed. Neuromuscular re-education                Manual Therapy:                Therapeutic Activities:                  Home program: 4/8/21: Jameeldal Taylor, reverse clam, S/L hip abduction, cross body shoulder stretch, sleeper stretch, doorway stretch  4/13/21: wall stretching, band pull-apart, child's pose, lift off, hip flexion stretches, SL RDL  4/20/21: LTR with legs crossed, cross body stretch with trunk rotation, hip hike     [facility] Portal  Treatment/Session Summary:    · Response to Treatment: Pt demonstrates improved hip flexion ROM though still tight at end range.    · Communication/Consultation:  None today  · Equipment provided today: None  · Recommendations/Intent for next treatment session: Next visit will focus on improving R shoulder flexion and IR and R hip flexion ROM and hip abduction strength.     Total Treatment Billable Duration:  45 minutes  PT Patient Time In/Time Out  Time In: 1397  Time Out: Daniel Navarrete, PT, DPT    Future Appointments   Date Time Provider Carleen Jenkins   5/4/2021 11:00 AM Ej Mering [facility]   5/7/2021 11:00 AM Ej Mering [facility]   5/11/2021 11:00 AM Ej Mering [facility]   5/14/2021  9:30 AM Rosio Rojas DPT [facility]   5/18/2021 11:00 AM Ej Mering [facility]   5/21/2021 11:00 AM Ej Mering [facility]   5/25/2021  2:00 PM Ej Mering [facility]

## 2021-05-04 ENCOUNTER — HOSPITAL ENCOUNTER (OUTPATIENT)
Dept: PHYSICAL THERAPY | Age: 71
Discharge: HOME | End: 2021-05-04
Payer: MEDICARE

## 2021-05-04 PROCEDURE — 97110 THERAPEUTIC EXERCISES: CPT

## 2021-05-04 NOTE — PROGRESS NOTES
Parish Elise  : 1950  [place]  [place]: [place] Annabella Brown 25 @ [address]  [address] [place] [address].  [phone]   [phone]      OUTPATIENT PHYSICAL THERAPY: Daily Treatment Note 2021  Date of Onset: 21  Visit Count:  8    ICD-10: Treatment Diagnosis: Pain in right shoulder (M25.511) and Stiffness of right shoulder, not elsewhere classified (M25.611) and Pain in right hip (M25.551) and Stiffness of right hip, not elsewhere classified (M25.651)  TREATMENT PLAN:  Effective Dates: 2021 TO 21. Frequency/Duration: 2 time a week for 6 weeks. GOALS: (Goals have been discussed and agreed upon with patient.)  Short-Term Functional Goals: Time Frame: 4 weeks  # Goal Status   1 Pt will confirm compliance with home program to facilitate improvement in function. NEW       Discharge goals: Time frame: 6 weeks   # Goal Status   1 Pt will be able to elevate UE to at least 140° without symptoms in order to participate in ADLs such as reaching overhead and pulling shirt on. NEW   2 Pt will be able to reach over head to touch opposite scapula without symptoms to be able to participate in ADLs such as reaching overhead and washing head. NEW   3 Pt will be able to reach across body to touch spine of opposite scapula without symptoms to be able to participate in ADLs such as reaching tasks. NEW   4 Pt will be able to reach behind back to touch TLJ without symptoms to be able to participate in dressing and toileting activities. NEW   5 Pt will have at least 125° of hip flexion to be able to put on socks/shoes and get in/out of the tub. NEW       Pre-treatment Symptoms/Complaints:  Pt reports she did a lot of stretching so her R chest and anterior shoulder is a little sore.    Pain: Initial:   Pt reports her R chest is a little sore from stretching so much Post Session:  No increase    Medications Last Reviewed: 5/4/2021  Updated Objective Findings: Below measures from initial evaluation unless otherwise noted. 4/8/21  Pain at worst: 4/10  Observation: Gait: no compensations noted  ROM:    Left Right   Elevation (°) 152 130 with pain and tightness   Behind neck reach To opposite scapula To upper back   Behind back reach To opposite scapula To lumbar region with pain   Cross body reach To opposite scapula To lateral aspect of scapula with pain   R ER WFL but IR limited to ~30 ° per visual estimation. R hip flexion limited to ~90 ° per visual estimation and R hip IR WFL but tight and stiff at end range. UE ANDT: All within normal limits B  Lower body ANDT: All tests WNL B  Strength: 5/5 throughout B shoulders. R hip abduction and reverse clam fatigued quickly in set of 10 reps  DASH: 46/55    TREATMENT:   THERAPEUTIC ACTIVITY: (see chart for minutes): Therapeutic activities per grid below to improve mobility, strength, balance and coordination. Required minimal visual, verbal and tactile cues to improve independence with functional mobility and activities. THERAPEUTIC EXERCISE: (see chart for minutes):  Exercises per grid below to improve mobility, strength, balance and coordination. Required minimal visual, verbal and tactile cues to promote proper body alignment and promote proper body mechanics. Progressed resistance, range, repetitions and complexity of movement as indicated. NEUROMUSCULAR RE-EDUCATION: (see chart for minutes):  Exercise/activities per grid below to improve balance, coordination, kinesthetic sense, posture, pain, and proprioception. Required minimal visual, verbal and tactile cues to promote static and dynamic balance in standing. MANUAL THERAPY: (see chart for minutes): Joint mobilization, Soft tissue mobilization, skin mobilization, and muscle energy techniques were utilized and necessary because of the patient's restricted joint motion, restricted motion of soft tissue and pain . MODALITIES: (see chart for minutes):      *  Cold Pack Therapy in order to provide analgesia. Date: 4/23/21 (visit 5)  4/27/21 (visit 6)  4/29/21 (visit 7)  5/4/21 (visit 8)     Modalities:                Therapeutic Exercise: 45 min 45 min 45 min 40 min     Bike with using handles: 5'   Wall stretch: 2x1' flexion, x1' abduction  Cross body stretch: x1'  Behind back stretch with strap: 2x1'  SKTC: 2x30 SH with clinician using belt to perform inferior glide   LTR with legs crossed: 2x3 B  Cross body stretch with trunk rotation: x5 R   Standing hip hike: x10 B Bike with using handles: 5'   Wall stretch: x1' flexion, x1' abduction, x1' in ER  Behind back stretch with strap: x1'  Behind head stretch on wall: x1'   SKTC: x30 SH. Hip flexion active stretch against resistance: 2x5 R, x5 with strap  LTR with legs crossed: 2x3 B  Cross body stretch with trunk rotation: x5 R   Standing hip hike: x10 B Bike with using handles: 5'   Wall stretch: x1' flexion, x1' abduction  Behind back stretch with strap: x1'  Behind head stretch on wall: x1'   Lift off: x10   SKTC: x30 SH. Hip flexion active stretch against resistance: x5 with strap  LTR with legs crossed: x5 B  Cross body stretch with trunk rotation: x3 R  Standing hip hike: x10 B  Bridge: x5, x10 SL B  Seated hip flexion stretch: x1'   Home program reviewed. Bike with using handles: 5'   Lunges: 2x5 B  SKTC: 2x30 SH.    LTR with legs crossed: x5 B  Standing hip hike: x12, x10 B  Bridge: x10, x10 SL B  Seated hip flexion with legs crossed stretch: x5 holds    Neuromuscular re-education                Manual Therapy:                Therapeutic Activities:                  Home program: 4/8/21: huong Nava, S/L hip abduction, cross body shoulder stretch, sleeper stretch, doorway stretch  4/13/21: wall stretching, band pull-apart, child's pose, lift off, hip flexion stretches, SL RDL  4/20/21: LTR with legs crossed, cross body stretch with trunk rotation, hip carissa     [facility] Portal  Treatment/Session Summary:    · Response to Treatment: Pt reported cross body stretch felt better today. She still has tightness with hip flexion though this range is also improving. · Communication/Consultation:  None today  · Equipment provided today: None  · Recommendations/Intent for next treatment session: Next visit will focus on improving R shoulder flexion and IR and R hip flexion ROM and hip abduction strength.     Total Treatment Billable Duration:  40 minutes  PT Patient Time In/Time Out  Time In: 1100  Time Out: [facility] PT, D[facility]    Future Appointments   Date Time Provider Carleen Jenkins   5/7/2021 11:00 AM Chelita Guevara [facility]   5/11/2021 11:00 AM Chelita RIZO [facility]   5/14/2021  9:30 AM SABRIAN Barroso [facility]   5/18/2021 11:00 AM Chelita RIZO [facility]   5/21/2021 11:00 AM Chelita RIZO [facility]   5/25/2021  2:00 PM Chelita RIZO [facility]

## 2021-05-07 ENCOUNTER — HOSPITAL ENCOUNTER (OUTPATIENT)
Dept: PHYSICAL THERAPY | Age: 71
Discharge: HOME | End: 2021-05-07
Payer: MEDICARE

## 2021-05-07 PROCEDURE — 97110 THERAPEUTIC EXERCISES: CPT

## 2021-05-07 NOTE — PROGRESS NOTES
Husam Jimenez  : 1950  [place]  [place]: [place] Annabella Brown [address] @ [address], [address] [place] [address]  [phone]   [phone]      OUTPATIENT PHYSICAL THERAPY: Daily Treatment and progress Note 2021  Date of Onset: 21  Visit Count:  9    ICD-10: Treatment Diagnosis: Pain in right shoulder (M25.511) and Stiffness of right shoulder, not elsewhere classified (M25.611) and Pain in right hip (M25.551) and Stiffness of right hip, not elsewhere classified (M25.651)  TREATMENT PLAN:  Effective Dates: 2021 TO 21. Frequency/Duration: 2 time a week for 6 weeks. GOALS: (Goals have been discussed and agreed upon with patient.)  Short-Term Functional Goals: Time Frame: 4 weeks  # Goal Status   1 Pt will confirm compliance with home program to facilitate improvement in function. MET       Discharge goals: Time frame: 6 weeks   # Goal Status   1 Pt will be able to elevate UE to at least 140° without symptoms in order to participate in ADLs such as reaching overhead and pulling shirt on. MET   2 Pt will be able to reach over head to touch opposite scapula without symptoms to be able to participate in ADLs such as reaching overhead and washing head. PROGRESSING   3 Pt will be able to reach across body to touch spine of opposite scapula without symptoms to be able to participate in ADLs such as reaching tasks. PARTIALLY MET   4 Pt will be able to reach behind back to touch TLJ without symptoms to be able to participate in dressing and toileting activities. PROGRESSING   5 Pt will have at least 125° of hip flexion to be able to put on socks/shoes and get in/out of the tub. PROGRESSING       Pre-treatment Symptoms/Complaints:  Pt reports her R shoulder feels good now and back to normal. She reports her hip has been a little sore but she thinks its from emphasizing new stretches.    Pain: Initial:   Pt reports she feels fine Post Session:  No pain, pt reported she felt looser now   Medications Last Reviewed: 5/7/2021  Updated Objective Findings: Below measures from initial evaluation unless otherwise noted. 4/8/21  Pain at worst: 4/10  Observation: Gait: no compensations noted  ROM:    Left Right   Elevation (°) 152 130 with pain and tightness   Behind neck reach To opposite scapula To upper back   Behind back reach To opposite scapula To lumbar region with pain   Cross body reach To opposite scapula To lateral aspect of scapula with pain   R ER WFL but IR limited to ~30 ° per visual estimation. R hip flexion limited to ~90 ° per visual estimation and R hip IR WFL but tight and stiff at end range. UE ANDT: All within normal limits B  Lower body ANDT: All tests WNL B  Strength: 5/5 throughout B shoulders. R hip abduction and reverse clam fatigued quickly in set of 10 reps  DASH: 46/55    5/7/21:    Right   Elevation (°) 140   Behind neck reach To upper back   Behind back reach To lumbar region, ~1\" inferior to TLJ   Cross body reach To opposite scapula with feeling of tightness     R hip flexion to 122 °. Pt reports no more R hip tightness with cross body stretch    TREATMENT:   THERAPEUTIC ACTIVITY: (see chart for minutes): Therapeutic activities per grid below to improve mobility, strength, balance and coordination. Required minimal visual, verbal and tactile cues to improve independence with functional mobility and activities. THERAPEUTIC EXERCISE: (see chart for minutes):  Exercises per grid below to improve mobility, strength, balance and coordination. Required minimal visual, verbal and tactile cues to promote proper body alignment and promote proper body mechanics. Progressed resistance, range, repetitions and complexity of movement as indicated.   NEUROMUSCULAR RE-EDUCATION: (see chart for minutes):  Exercise/activities per grid below to improve balance, coordination, kinesthetic sense, posture, pain, and proprioception. Required minimal visual, verbal and tactile cues to promote static and dynamic balance in standing. MANUAL THERAPY: (see chart for minutes): Joint mobilization, Soft tissue mobilization, skin mobilization, and muscle energy techniques were utilized and necessary because of the patient's restricted joint motion, restricted motion of soft tissue and pain . MODALITIES: (see chart for minutes):      *  Cold Pack Therapy in order to provide analgesia. Date: 4/23/21 (visit 5)  4/27/21 (visit 6)  4/29/21 (visit 7)  5/4/21 (visit 8)  5/7/21 (visit 9) PN   Modalities:                Therapeutic Exercise: 45 min 45 min 45 min 40 min 40 min    Bike with using handles: 5'   Wall stretch: 2x1' flexion, x1' abduction  Cross body stretch: x1'  Behind back stretch with strap: 2x1'  SKTC: 2x30 SH with clinician using belt to perform inferior glide   LTR with legs crossed: 2x3 B  Cross body stretch with trunk rotation: x5 R   Standing hip hike: x10 B Bike with using handles: 5'   Wall stretch: x1' flexion, x1' abduction, x1' in ER  Behind back stretch with strap: x1'  Behind head stretch on wall: x1'   SKTC: x30 SH. Hip flexion active stretch against resistance: 2x5 R, x5 with strap  LTR with legs crossed: 2x3 B  Cross body stretch with trunk rotation: x5 R   Standing hip hike: x10 B Bike with using handles: 5'   Wall stretch: x1' flexion, x1' abduction  Behind back stretch with strap: x1'  Behind head stretch on wall: x1'   Lift off: x10   SKTC: x30 SH. Hip flexion active stretch against resistance: x5 with strap  LTR with legs crossed: x5 B  Cross body stretch with trunk rotation: x3 R  Standing hip hike: x10 B  Bridge: x5, x10 SL B  Seated hip flexion stretch: x1'   Home program reviewed. Bike with using handles: 5'   Lunges: 2x5 B  SKTC: 2x30 SH.    LTR with legs crossed: x5 B  Standing hip hike: x12, x10 B  Bridge: x10, x10 SL B  Seated hip flexion with legs crossed stretch: x5 holds Bike with using handles: 5'   Lunges: x10 B  SKTC: 2x5 holds, second set with strap for inferior glide   LTR with legs crossed: x5 B each with legs crossed both ways  Seated hip flexion with legs crossed stretch: x5 holds  Wall flexion stretch: 2x1'   Cross body shoulder stretch: x3 holds   Squat: 5x15#, x3 with no weight and just stretching  Quad stretch: x1 hold B   Neuromuscular re-education                Manual Therapy:                Therapeutic Activities:                  Home program: 4/8/21: Herb Asa, reverse clam, S/L hip abduction, cross body shoulder stretch, sleeper stretch, doorway stretch  4/13/21: wall stretching, band pull-apart, child's pose, lift off, hip flexion stretches, SL RDL  4/20/21: LTR with legs crossed, cross body stretch with trunk rotation, hip hike     Med[facility] Portal  Treatment/Session Summary:    · Response to Treatment: Pt demonstrates significant improvement with R shoulder ROM and hip ROM. She reports her shoulder has been feeling better in shoulder and can tell hip is improving. · Communication/Consultation:  None today  · Equipment provided today: None  · Recommendations/Intent for next treatment session: Next visit will focus on improving R shoulder ROM behind back and R hip flexion ROM and hip abduction strength.     Total Treatment Billable Duration:  40 minutes  PT Patient Time In/Time Out  Time In: 1100  Time Out: [facility], D[facility]    Future Appointments   Date Time Provider Carleen Jenkins   5/11/2021 11:00 AM [place] Crooked [facility]   5/14/2021  9:30 AM Samuel Murray PTA [facility]   5/18/2021 11:00 AM [place] Crooked [facility]   5/21/2021 11:00 AM [facility] Crooked [facility]   5/25/2021  2:00 PM [place] Crooked [facility]

## 2021-05-11 ENCOUNTER — HOSPITAL ENCOUNTER (OUTPATIENT)
Dept: PHYSICAL THERAPY | Age: 71
Discharge: HOME | End: 2021-05-11
Payer: MEDICARE

## 2021-05-11 PROCEDURE — 97110 THERAPEUTIC EXERCISES: CPT

## 2021-05-11 NOTE — PROGRESS NOTES
Bethany Johny  : 1950  [place]  [place] Annabella Brown 25 @ [place] [address].  [phone]   [phone]      OUTPATIENT PHYSICAL THERAPY: Daily Treatment Note 2021  Date of Onset: 21  Visit Count:  10    ICD-10: Treatment Diagnosis: Pain in right shoulder (M25.511) and Stiffness of right shoulder, not elsewhere classified (M25.611) and Pain in right hip (M25.551) and Stiffness of right hip, not elsewhere classified (M25.651)  TREATMENT PLAN:  Effective Dates: 2021 TO 21. Frequency/Duration: 2 time a week for 6 weeks. GOALS: (Goals have been discussed and agreed upon with patient.)  Short-Term Functional Goals: Time Frame: 4 weeks  # Goal Status   1 Pt will confirm compliance with home program to facilitate improvement in function. MET       Discharge goals: Time frame: 6 weeks   # Goal Status   1 Pt will be able to elevate UE to at least 140° without symptoms in order to participate in ADLs such as reaching overhead and pulling shirt on. MET   2 Pt will be able to reach over head to touch opposite scapula without symptoms to be able to participate in ADLs such as reaching overhead and washing head. PROGRESSING   3 Pt will be able to reach across body to touch spine of opposite scapula without symptoms to be able to participate in ADLs such as reaching tasks. PARTIALLY MET   4 Pt will be able to reach behind back to touch TLJ without symptoms to be able to participate in dressing and toileting activities. PROGRESSING   5 Pt will have at least 125° of hip flexion to be able to put on socks/shoes and get in/out of the tub. PROGRESSING       Pre-treatment Symptoms/Complaints:  Pt reports that she was very busy over the weekend but her shoulder and hip generally did well with all the cleaning she had to do. She does still squat with more weight on L.    Pain: Initial:   Pt reports she feels good Post Session:  No pain, pt reported she feels great   Medications Last Reviewed: 5/11/2021  Updated Objective Findings: Below measures from initial evaluation unless otherwise noted. 4/8/21  Pain at worst: 4/10  Observation: Gait: no compensations noted  ROM:    Left Right   Elevation (°) 152 130 with pain and tightness   Behind neck reach To opposite scapula To upper back   Behind back reach To opposite scapula To lumbar region with pain   Cross body reach To opposite scapula To lateral aspect of scapula with pain   R ER WFL but IR limited to ~30 ° per visual estimation. R hip flexion limited to ~90 ° per visual estimation and R hip IR WFL but tight and stiff at end range. UE ANDT: All within normal limits B  Lower body ANDT: All tests WNL B  Strength: 5/5 throughout B shoulders. R hip abduction and reverse clam fatigued quickly in set of 10 reps  DASH: 46/55    5/7/21:    Right   Elevation (°) 140   Behind neck reach To upper back   Behind back reach To lumbar region, ~1\" inferior to TLJ   Cross body reach To opposite scapula with feeling of tightness     R hip flexion to 122 °. Pt reports no more R hip tightness with cross body stretch    TREATMENT:   THERAPEUTIC ACTIVITY: (see chart for minutes): Therapeutic activities per grid below to improve mobility, strength, balance and coordination. Required minimal visual, verbal and tactile cues to improve independence with functional mobility and activities. THERAPEUTIC EXERCISE: (see chart for minutes):  Exercises per grid below to improve mobility, strength, balance and coordination. Required minimal visual, verbal and tactile cues to promote proper body alignment and promote proper body mechanics. Progressed resistance, range, repetitions and complexity of movement as indicated.   NEUROMUSCULAR RE-EDUCATION: (see chart for minutes):  Exercise/activities per grid below to improve balance, coordination, kinesthetic sense, posture, pain, and proprioception. Required minimal visual, verbal and tactile cues to promote static and dynamic balance in standing. MANUAL THERAPY: (see chart for minutes): Joint mobilization, Soft tissue mobilization, skin mobilization, and muscle energy techniques were utilized and necessary because of the patient's restricted joint motion, restricted motion of soft tissue and pain . MODALITIES: (see chart for minutes):      *  Cold Pack Therapy in order to provide analgesia. Date: 5/7/21 (visit 9) PN 5/11/21 (visit 10)       Modalities:                Therapeutic Exercise: 40 min 40 min       Bike with using handles: 5'   Lunges: x10 B  SKTC: 2x5 holds, second set with strap for inferior glide   LTR with legs crossed: x5 B each with legs crossed both ways  Seated hip flexion with legs crossed stretch: x5 holds  Wall flexion stretch: 2x1'   Cross body shoulder stretch: x3 holds   Squat: 5x15#, x3 with no weight and just stretching  Quad stretch: x1 hold B Bike with using handles: 5'   Lunges: x10 B  SKTC: 2x3 holds  LTR with legs crossed: x5 B each with legs crossed both ways  Seated hip flexion with legs crossed stretch: x5 holds  Seated hip flexion stretch: 2x3 holds   Squat: x2, x2 to 6\" step, x2 to 6\" step with legs crossed  Standing hip hike: x10 B      Neuromuscular re-education                Manual Therapy:                Therapeutic Activities:                  Home program: 4/8/21: SKTC, reverse clam, S/L hip abduction, cross body shoulder stretch, sleeper stretch, doorway stretch  4/13/21: wall stretching, band pull-apart, child's pose, lift off, hip flexion stretches, SL RDL  4/20/21: LTR with legs crossed, cross body stretch with trunk rotation, hip hike     Med[facility] Portal  Treatment/Session Summary:    · Response to Treatment: Pt tolerated exercises well. She still has tightness into end range hip flexion which is her primary limitation now.    · Communication/Consultation:  None today  · Equipment provided today: None  · Recommendations/Intent for next treatment session: Next visit will focus on improving R shoulder ROM behind back and R hip flexion ROM and hip abduction strength.     Total Treatment Billable Duration:  40 minutes  PT Patient Time In/Time Out  Time In: 1100  Time Out: 1829 [facility] Alis Chopra, PT, DPT    Future Appointments   Date Time Provider Carleen Jenkins   5/14/2021  9:30 AM Richar Umanzor PTA [facility]   5/18/2021 11:00 AM Dyna Gordon [facility]   5/21/2021 11:00 AM Dyan RIZO [facility]   5/25/2021  2:00 PM Dyan RIZO [facility]

## 2021-05-14 ENCOUNTER — HOSPITAL ENCOUNTER (OUTPATIENT)
Dept: PHYSICAL THERAPY | Age: 71
Discharge: HOME | End: 2021-05-14
Payer: MEDICARE

## 2021-05-14 PROCEDURE — 97110 THERAPEUTIC EXERCISES: CPT

## 2021-05-14 NOTE — PROGRESS NOTES
Cristiano Oar  : 1950  [place]  [place]: [place] Annabella Brown 25 @ [place] [address].  [phone]   [phone]      OUTPATIENT PHYSICAL THERAPY: Daily Treatment Note 2021  Date of Onset: 21  Visit Count:  11    ICD-10: Treatment Diagnosis: Pain in right shoulder (M25.511) and Stiffness of right shoulder, not elsewhere classified (M25.611) and Pain in right hip (M25.551) and Stiffness of right hip, not elsewhere classified (M25.651)  TREATMENT PLAN:  Effective Dates: 2021 TO 21. Frequency/Duration: 2 time a week for 6 weeks. GOALS: (Goals have been discussed and agreed upon with patient.)  Short-Term Functional Goals: Time Frame: 4 weeks  # Goal Status   1 Pt will confirm compliance with home program to facilitate improvement in function. MET       Discharge goals: Time frame: 6 weeks   # Goal Status   1 Pt will be able to elevate UE to at least 140° without symptoms in order to participate in ADLs such as reaching overhead and pulling shirt on. MET   2 Pt will be able to reach over head to touch opposite scapula without symptoms to be able to participate in ADLs such as reaching overhead and washing head. PROGRESSING   3 Pt will be able to reach across body to touch spine of opposite scapula without symptoms to be able to participate in ADLs such as reaching tasks. PARTIALLY MET   4 Pt will be able to reach behind back to touch TLJ without symptoms to be able to participate in dressing and toileting activities. PROGRESSING   5 Pt will have at least 125° of hip flexion to be able to put on socks/shoes and get in/out of the tub. PROGRESSING       Pre-treatment Symptoms/Complaints:  Feeling more comfortable with getting up and down from the floor. Still feels tight after prolonged rest or when first getting up in the morning. Tightness noted both at the hip and shoulder.     Pain: Initial:   Pt reports she feels good Post Session:  No pain, pt reported she feels great   Medications Last Reviewed: 5/14/2021  Updated Objective Findings: Below measures from initial evaluation unless otherwise noted. 4/8/21  Pain at worst: 4/10  Observation: Gait: no compensations noted  ROM:    Left Right   Elevation (°) 152 130 with pain and tightness   Behind neck reach To opposite scapula To upper back   Behind back reach To opposite scapula To lumbar region with pain   Cross body reach To opposite scapula To lateral aspect of scapula with pain   R ER WFL but IR limited to ~30 ° per visual estimation. R hip flexion limited to ~90 ° per visual estimation and R hip IR WFL but tight and stiff at end range. UE ANDT: All within normal limits B  Lower body ANDT: All tests WNL B  Strength: 5/5 throughout B shoulders. R hip abduction and reverse clam fatigued quickly in set of 10 reps  DASH: 46/55    5/7/21:    Right   Elevation (°) 140   Behind neck reach To upper back   Behind back reach To lumbar region, ~1\" inferior to TLJ   Cross body reach To opposite scapula with feeling of tightness     R hip flexion to 122 °. Pt reports no more R hip tightness with cross body stretch    TREATMENT:   THERAPEUTIC ACTIVITY: (see chart for minutes): Therapeutic activities per grid below to improve mobility, strength, balance and coordination. Required minimal visual, verbal and tactile cues to improve independence with functional mobility and activities. THERAPEUTIC EXERCISE: (see chart for minutes):  Exercises per grid below to improve mobility, strength, balance and coordination. Required minimal visual, verbal and tactile cues to promote proper body alignment and promote proper body mechanics. Progressed resistance, range, repetitions and complexity of movement as indicated.   NEUROMUSCULAR RE-EDUCATION: (see chart for minutes):  Exercise/activities per grid below to improve balance, coordination, kinesthetic sense, posture, pain, and proprioception. Required minimal visual, verbal and tactile cues to promote static and dynamic balance in standing. MANUAL THERAPY: (see chart for minutes): Joint mobilization, Soft tissue mobilization, skin mobilization, and muscle energy techniques were utilized and necessary because of the patient's restricted joint motion, restricted motion of soft tissue and pain . MODALITIES: (see chart for minutes):      *  Cold Pack Therapy in order to provide analgesia.      Date: 5/7/21 (visit 9) PN 5/11/21 (visit 10)  5/14/21 (visit 11)     Modalities:                Therapeutic Exercise: 40 min 40 min 45 min       Bike with using handles: 5'   Lunges: x10 B  SKTC: 2x5 holds, second set with strap for inferior glide   LTR with legs crossed: x5 B each with legs crossed both ways  Seated hip flexion with legs crossed stretch: x5 holds  Wall flexion stretch: 2x1'   Cross body shoulder stretch: x3 holds   Squat: 5x15#, x3 with no weight and just stretching  Quad stretch: x1 hold B Bike with using handles: 5'   Lunges: x10 B  SKTC: 2x3 holds  LTR with legs crossed: x5 B each with legs crossed both ways  Seated hip flexion with legs crossed stretch: x5 holds  Seated hip flexion stretch: 2x3 holds   Squat: x2, x2 to 6\" step, x2 to 6\" step with legs crossed  Standing hip hike: x10 B Bike using handles 6'  SKTC 2x3 holds  Lateral hip distraction with belt 10x  SKTC x2 holds  LTR with legs crossed x5 B each with legs crossed both ways  Wall wash 2x1'  Cross body shoulder stretch 2' total  Active hip flexion leaning on wall with knee hug to chest and slow lower 10x  Lunge 10x  Squat 10x to floor     Neuromuscular re-education                Manual Therapy:                Therapeutic Activities:                  Home program: 4/8/21: SKTC, reverse clam, S/L hip abduction, cross body shoulder stretch, sleeper stretch, doorway stretch  4/13/21: wall stretching, band pull-apart, child's pose, lift off, hip flexion stretches, SL RDL  4/20/21: LTR with legs crossed, cross body stretch with trunk rotation, hip hike     [facility] Portal  Treatment/Session Summary:    · Response to Treatment: lateral distraction with belt helps to improve comfort with end range hip flexion. Demonstrates symmetry with squatting through full range to floor. · Communication/Consultation:  None today  · Equipment provided today: None  · Recommendations/Intent for next treatment session: Next visit will focus on improving R shoulder ROM behind back and R hip flexion ROM and hip abduction strength.     Total Treatment Billable Duration:  45 minutes  PT Patient Time In/Time Out  Time In: 0930  Time Out: [address] Tushar Toth DPT, PT, DPT    Future Appointments   Date Time Provider Carleen Jenkins   5/18/2021 11:00 AM Fareed Hilliard [facility]   5/21/2021 11:00 AM Fareed [facility]LETCHERVARUN [facility]   5/25/2021  2:00 PM Fareed [facility]ERVAR[facility]

## 2021-05-18 ENCOUNTER — HOSPITAL ENCOUNTER (OUTPATIENT)
Dept: PHYSICAL THERAPY | Age: 71
Discharge: HOME | End: 2021-05-18
Payer: MEDICARE

## 2021-05-18 PROCEDURE — 97110 THERAPEUTIC EXERCISES: CPT

## 2021-05-18 NOTE — PROGRESS NOTES
Maribel Horta  : 1950  [place]  [place] Annabella Brown [address] @ [address] N[place]aj[place].  [phone]   [phone]      OUTPATIENT PHYSICAL THERAPY: Daily Treatment and progress Note 2021  Date of Onset: 21  Visit Count:  12    ICD-10: Treatment Diagnosis: Pain in right shoulder (M25.511) and Stiffness of right shoulder, not elsewhere classified (M25.611) and Pain in right hip (M25.551) and Stiffness of right hip, not elsewhere classified (M25.651)  TREATMENT PLAN:  Effective Dates: 2021 TO 21. Frequency/Duration: 2 time a week for 1 week  GOALS: (Goals have been discussed and agreed upon with patient.)  Short-Term Functional Goals: Time Frame: 4 weeks  # Goal Status   1 Pt will confirm compliance with home program to facilitate improvement in function. MET       Discharge goals: Time frame: 6 weeks   # Goal Status   1 Pt will be able to elevate UE to at least 140° without symptoms in order to participate in ADLs such as reaching overhead and pulling shirt on. MET   2 Pt will be able to reach over head to touch opposite scapula without symptoms to be able to participate in ADLs such as reaching overhead and washing head. PROGRESSING   3 Pt will be able to reach across body to touch spine of opposite scapula without symptoms to be able to participate in ADLs such as reaching tasks. PARTIALLY MET   4 Pt will be able to reach behind back to touch TLJ without symptoms to be able to participate in dressing and toileting activities. PROGRESSING   5 Pt will have at least 125° of hip flexion to be able to put on socks/shoes and get in/out of the tub. PROGRESSING       Pre-treatment Symptoms/Complaints: Pt reports she has much greater shoulder ROM and hip ROM but still has some pain with end range stretches.    Pain: Initial:   Pt reports she feels fine Post Session:  No pain   Medications Last Reviewed: 5/18/2021  Updated Objective Findings: Below measures from initial evaluation unless otherwise noted. 4/8/21  Pain at worst: 4/10  Observation: Gait: no compensations noted  ROM:    Left Right   Elevation (°) 152 130 with pain and tightness   Behind neck reach To opposite scapula To upper back   Behind back reach To opposite scapula To lumbar region with pain   Cross body reach To opposite scapula To lateral aspect of scapula with pain   R ER WFL but IR limited to ~30 ° per visual estimation. R hip flexion limited to ~90 ° per visual estimation and R hip IR WFL but tight and stiff at end range. UE ANDT: All within normal limits B  Lower body ANDT: All tests WNL B  Strength: 5/5 throughout B shoulders. R hip abduction and reverse clam fatigued quickly in set of 10 reps  DASH: 46/55    5/7/21:    Right   Elevation (°) 140   Behind neck reach To upper back   Behind back reach To lumbar region, ~1\" inferior to TLJ   Cross body reach To opposite scapula with feeling of tightness     R hip flexion to 122 °. Pt reports no more R hip tightness with cross body stretch    5/18/21:    Right   Behind neck reach To upper back   Behind back reach To lumbar region with greater ease but still not to TLJ       TREATMENT:   THERAPEUTIC ACTIVITY: (see chart for minutes): Therapeutic activities per grid below to improve mobility, strength, balance and coordination. Required minimal visual, verbal and tactile cues to improve independence with functional mobility and activities. THERAPEUTIC EXERCISE: (see chart for minutes):  Exercises per grid below to improve mobility, strength, balance and coordination. Required minimal visual, verbal and tactile cues to promote proper body alignment and promote proper body mechanics. Progressed resistance, range, repetitions and complexity of movement as indicated.   NEUROMUSCULAR RE-EDUCATION: (see chart for minutes):  Exercise/activities per grid below to improve balance, coordination, kinesthetic sense, posture, pain, and proprioception. Required minimal visual, verbal and tactile cues to promote static and dynamic balance in standing. MANUAL THERAPY: (see chart for minutes): Joint mobilization, Soft tissue mobilization, skin mobilization, and muscle energy techniques were utilized and necessary because of the patient's restricted joint motion, restricted motion of soft tissue and pain . MODALITIES: (see chart for minutes):      *  Cold Pack Therapy in order to provide analgesia.      Date: 5/7/21 (visit 9) PN 5/11/21 (visit 10)  5/14/21 (visit 11) 5/18/21 (visit 12)    Modalities:                Therapeutic Exercise: 40 min 40 min 45 min  42 min     Bike with using handles: 5'   Lunges: x10 B  SKTC: 2x5 holds, second set with strap for inferior glide   LTR with legs crossed: x5 B each with legs crossed both ways  Seated hip flexion with legs crossed stretch: x5 holds  Wall flexion stretch: 2x1'   Cross body shoulder stretch: x3 holds   Squat: 5x15#, x3 with no weight and just stretching  Quad stretch: x1 hold B Bike with using handles: 5'   Lunges: x10 B  SKTC: 2x3 holds  LTR with legs crossed: x5 B each with legs crossed both ways  Seated hip flexion with legs crossed stretch: x5 holds  Seated hip flexion stretch: 2x3 holds   Squat: x2, x2 to 6\" step, x2 to 6\" step with legs crossed  Standing hip hike: x10 B Bike using handles 6'  SKTC 2x3 holds  Lateral hip distraction with belt 10x  SKTC x2 holds  LTR with legs crossed x5 B each with legs crossed both ways  Wall wash 2x1'  Cross body shoulder stretch 2' total  Active hip flexion leaning on wall with knee hug to chest and slow lower 10x  Lunge 10x  Squat 10x to floor Bike using handles 5'  SKTC 2x3 holds  LTR with legs crossed x5 B each with legs crossed both ways  Lunge: 2x10 B  Squat: x3 in full ROM  Child's pose: x3 holds   SL RDL: x5 B  Sleeper stretch: x3 with clinician fixating shoulder blade     Neuromuscular re-education                Manual Therapy:                Therapeutic Activities:                  Home program: 4/8/21: Alber Steele, reverse clam, S/L hip abduction, cross body shoulder stretch, sleeper stretch, doorway stretch  4/13/21: wall stretching, band pull-apart, child's pose, lift off, hip flexion stretches, SL RDL  4/20/21: LTR with legs crossed, cross body stretch with trunk rotation, hip hike     Med[facility] Portal  Treatment/Session Summary:    · Response to Treatment: Pt making progress with ROM and ADL performance. She still has tightness with R hip flexion and R shoulder IR. She will continue to benefit from physical therapy to continue stretching to improve ROM and review home program.   · Communication/Consultation:  None today  · Equipment provided today: None  · Recommendations/Intent for next treatment session: Next visit will focus on improving R shoulder ROM behind back and R hip flexion ROM and hip abduction strength.     Total Treatment Billable Duration:  42 minutes  PT Patient Time In/Time Out  Time In: 1100  Time Out: [facility] Jacobo Sadler PT, DPT    Future Appointments   Date Time Provider Carleen Jenkins   5/21/2021 11:00 AM Manuela Limon [facility]   5/25/2021  2:00 PM Manuela Limon [facility]

## 2021-05-21 ENCOUNTER — HOSPITAL ENCOUNTER (OUTPATIENT)
Dept: PHYSICAL THERAPY | Age: 71
Discharge: HOME | End: 2021-05-21
Payer: MEDICARE

## 2021-05-21 PROCEDURE — 97110 THERAPEUTIC EXERCISES: CPT

## 2021-05-21 NOTE — PROGRESS NOTES
Kathy Bae  : 1950  [place]  [place] Annabella Claros @ [place] [address].  [phone]   [phone]      OUTPATIENT PHYSICAL THERAPY: Daily Treatment Note 2021  Date of Onset: 21  Visit Count:  13    ICD-10: Treatment Diagnosis: Pain in right shoulder (M25.511) and Stiffness of right shoulder, not elsewhere classified (M25.611) and Pain in right hip (M25.551) and Stiffness of right hip, not elsewhere classified (M25.651)  TREATMENT PLAN:  Effective Dates: 2021 TO 21. Frequency/Duration: 2 time a week for 1 week  GOALS: (Goals have been discussed and agreed upon with patient.)  Short-Term Functional Goals: Time Frame: 4 weeks  # Goal Status   1 Pt will confirm compliance with home program to facilitate improvement in function. MET       Discharge goals: Time frame: 6 weeks   # Goal Status   1 Pt will be able to elevate UE to at least 140° without symptoms in order to participate in ADLs such as reaching overhead and pulling shirt on. MET   2 Pt will be able to reach over head to touch opposite scapula without symptoms to be able to participate in ADLs such as reaching overhead and washing head. PROGRESSING   3 Pt will be able to reach across body to touch spine of opposite scapula without symptoms to be able to participate in ADLs such as reaching tasks. PARTIALLY MET   4 Pt will be able to reach behind back to touch TLJ without symptoms to be able to participate in dressing and toileting activities. PROGRESSING   5 Pt will have at least 125° of hip flexion to be able to put on socks/shoes and get in/out of the tub. PROGRESSING       Pre-treatment Symptoms/Complaints: Pt reports she has continued stretches and leg feels fine unless she sits for a long time and then it's stiff.    Pain: Initial:   Pt reports she no pain Post Session:  No pain, pt reports she feels much looser Medications Last Reviewed: 5/21/2021  Updated Objective Findings: Below measures from initial evaluation unless otherwise noted. 4/8/21  Pain at worst: 4/10  Observation: Gait: no compensations noted  ROM:    Left Right   Elevation (°) 152 130 with pain and tightness   Behind neck reach To opposite scapula To upper back   Behind back reach To opposite scapula To lumbar region with pain   Cross body reach To opposite scapula To lateral aspect of scapula with pain   R ER WFL but IR limited to ~30 ° per visual estimation. R hip flexion limited to ~90 ° per visual estimation and R hip IR WFL but tight and stiff at end range. UE ANDT: All within normal limits B  Lower body ANDT: All tests WNL B  Strength: 5/5 throughout B shoulders. R hip abduction and reverse clam fatigued quickly in set of 10 reps  DASH: 46/55    5/7/21:    Right   Elevation (°) 140   Behind neck reach To upper back   Behind back reach To lumbar region, ~1\" inferior to TLJ   Cross body reach To opposite scapula with feeling of tightness     R hip flexion to 122 °. Pt reports no more R hip tightness with cross body stretch    5/18/21:    Right   Behind neck reach To upper back   Behind back reach To lumbar region with greater ease but still not to TLJ       TREATMENT:   THERAPEUTIC ACTIVITY: (see chart for minutes): Therapeutic activities per grid below to improve mobility, strength, balance and coordination. Required minimal visual, verbal and tactile cues to improve independence with functional mobility and activities. THERAPEUTIC EXERCISE: (see chart for minutes):  Exercises per grid below to improve mobility, strength, balance and coordination. Required minimal visual, verbal and tactile cues to promote proper body alignment and promote proper body mechanics. Progressed resistance, range, repetitions and complexity of movement as indicated.   NEUROMUSCULAR RE-EDUCATION: (see chart for minutes):  Exercise/activities per grid below to improve balance, coordination, kinesthetic sense, posture, pain, and proprioception. Required minimal visual, verbal and tactile cues to promote static and dynamic balance in standing. MANUAL THERAPY: (see chart for minutes): Joint mobilization, Soft tissue mobilization, skin mobilization, and muscle energy techniques were utilized and necessary because of the patient's restricted joint motion, restricted motion of soft tissue and pain . MODALITIES: (see chart for minutes):      *  Cold Pack Therapy in order to provide analgesia.      Date: 5/7/21 (visit 9) PN 5/11/21 (visit 10)  5/14/21 (visit 11) 5/18/21 (visit 12) 5/21/21 (visit 13)    Modalities:                Therapeutic Exercise: 40 min 40 min 45 min  42 min 40 min    Bike with using handles: 5'   Lunges: x10 B  SKTC: 2x5 holds, second set with strap for inferior glide   LTR with legs crossed: x5 B each with legs crossed both ways  Seated hip flexion with legs crossed stretch: x5 holds  Wall flexion stretch: 2x1'   Cross body shoulder stretch: x3 holds   Squat: 5x15#, x3 with no weight and just stretching  Quad stretch: x1 hold B Bike with using handles: 5'   Lunges: x10 B  SKTC: 2x3 holds  LTR with legs crossed: x5 B each with legs crossed both ways  Seated hip flexion with legs crossed stretch: x5 holds  Seated hip flexion stretch: 2x3 holds   Squat: x2, x2 to 6\" step, x2 to 6\" step with legs crossed  Standing hip hike: x10 B Bike using handles 6'  SKTC 2x3 holds  Lateral hip distraction with belt 10x  SKTC x2 holds  LTR with legs crossed x5 B each with legs crossed both ways  Wall wash 2x1'  Cross body shoulder stretch 2' total  Active hip flexion leaning on wall with knee hug to chest and slow lower 10x  Lunge 10x  Squat 10x to floor Bike using handles 5'  SKTC 2x3 holds  LTR with legs crossed x5 B each with legs crossed both ways  Lunge: 2x10 B  Squat: x3 in full ROM  Child's pose: x3 holds   SL RDL: x5 B  Sleeper stretch: x3 with clinician fixating shoulder blade  Bike using handles 5'  SKTC 2x3 holds  LTR with legs crossed x5 B each with legs crossed both ways  Bridge: x10  SL bridge: 2x10 B  Side plank + 3 reps of hip abduction: 2x3 B   Cross leg forward stretch: x3 holds   Lunge: 2x10 B   Neuromuscular re-education                Manual Therapy:                Therapeutic Activities:                  Home program: 4/8/21: Bennetta Citrin, reverse clam, S/L hip abduction, cross body shoulder stretch, sleeper stretch, doorway stretch  4/13/21: wall stretching, band pull-apart, child's pose, lift off, hip flexion stretches, SL RDL  4/20/21: LTR with legs crossed, cross body stretch with trunk rotation, hip hike     [facility] Portal  Treatment/Session Summary:    · Response to Treatment: Pt had been doing side plank on only 1 side and she was educated to perform on both sides at home which she found challenging when L side downward. · Communication/Consultation:  None today  · Equipment provided today: None  · Recommendations/Intent for next treatment session: Next visit will focus on improving R shoulder ROM behind back and R hip flexion ROM and hip abduction strength.     Total Treatment Billable Duration:  40 minutes  PT Patient Time In/Time Out  Time In: 1100  Time Out: [address] Yasmani Eduardo PT, DPT    Future Appointments   Date Time Provider Carleen Jenkins   5/25/2021  2:00 PM Octavia Esteban [facility]

## 2021-05-25 ENCOUNTER — HOSPITAL ENCOUNTER (OUTPATIENT)
Dept: PHYSICAL THERAPY | Age: 71
Discharge: HOME | End: 2021-05-25
Payer: MEDICARE

## 2021-05-25 PROCEDURE — 97110 THERAPEUTIC EXERCISES: CPT

## 2021-05-25 NOTE — PROGRESS NOTES
I am accessing Ms. Avelino Casas chart as a part of our department's internal chart auditing process. I certify that Ms. Joseph Napoles is, or was, a patient in our department.   Thank you,  Seng Aly, PT, DPT  5/25/2021

## 2021-05-25 NOTE — PROGRESS NOTES
Hadley Grant  : 1950  [place]  [place] Annabella Brown 25 @ [address], [address] [place] [address]  [phone]   [phone]      OUTPATIENT PHYSICAL THERAPY: Daily Treatment and discharge  Note 2021  Date of Onset: 21  Visit Count:  14    ICD-10: Treatment Diagnosis: Pain in right shoulder (M25.511) and Stiffness of right shoulder, not elsewhere classified (M25.611) and Pain in right hip (M25.551) and Stiffness of right hip, not elsewhere classified (M25.651)  TREATMENT PLAN:  Effective Dates: 2021 TO 21. Frequency/Duration: 2 time a week for 1 week  GOALS: (Goals have been discussed and agreed upon with patient.)  Short-Term Functional Goals: Time Frame: 4 weeks  # Goal Status   1 Pt will confirm compliance with home program to facilitate improvement in function. MET       Discharge goals: Time frame: 6 weeks   # Goal Status   1 Pt will be able to elevate UE to at least 140° without symptoms in order to participate in ADLs such as reaching overhead and pulling shirt on. MET   2 Pt will be able to reach over head to touch opposite scapula without symptoms to be able to participate in ADLs such as reaching overhead and washing head. MET   3 Pt will be able to reach across body to touch spine of opposite scapula without symptoms to be able to participate in ADLs such as reaching tasks. MET   4 Pt will be able to reach behind back to touch TLJ without symptoms to be able to participate in dressing and toileting activities. MET   5 Pt will have at least 125° of hip flexion to be able to put on socks/shoes and get in/out of the tub. PARTIALLY MET       Pre-treatment Symptoms/Complaints: Pt reports she minorly strained R hip lifting from awkward position. She is able to walk and do all mobility and ADLs normally, her hip is just sore.    Pain: Initial:   Pt reports her R hip is sore because she pulled it the other day coming up from lifting. Post Session:  No increase    Medications Last Reviewed: 5/25/2021  Updated Objective Findings: Below measures from initial evaluation unless otherwise noted. 4/8/21  Pain at worst: 4/10  Observation: Gait: no compensations noted  ROM:    Left Right   Elevation (°) 152 130 with pain and tightness   Behind neck reach To opposite scapula To upper back   Behind back reach To opposite scapula To lumbar region with pain   Cross body reach To opposite scapula To lateral aspect of scapula with pain   R ER WFL but IR limited to ~30 ° per visual estimation. R hip flexion limited to ~90 ° per visual estimation and R hip IR WFL but tight and stiff at end range. UE ANDT: All within normal limits B  Lower body ANDT: All tests WNL B  Strength: 5/5 throughout B shoulders. R hip abduction and reverse clam fatigued quickly in set of 10 reps  DASH: 46/55    5/7/21:    Right   Elevation (°) 140   Behind neck reach To upper back   Behind back reach To lumbar region, ~1\" inferior to TLJ   Cross body reach To opposite scapula with feeling of tightness     R hip flexion to 122 °. Pt reports no more R hip tightness with cross body stretch    5/18/21:    Right   Behind neck reach To upper back   Behind back reach To lumbar region with greater ease but still not to TLJ     5/25/21:    Right   Behind neck reach To opposite scapula   Behind back reach To TLJ though not symmetrical to opposite side   Cross body reach To opposite scapula   R Hip flexion to 121 °   DASH: 18/55     TREATMENT:   THERAPEUTIC ACTIVITY: (see chart for minutes): Therapeutic activities per grid below to improve mobility, strength, balance and coordination. Required minimal visual, verbal and tactile cues to improve independence with functional mobility and activities. THERAPEUTIC EXERCISE: (see chart for minutes):  Exercises per grid below to improve mobility, strength, balance and coordination.   Required minimal visual, verbal and tactile cues to promote proper body alignment and promote proper body mechanics. Progressed resistance, range, repetitions and complexity of movement as indicated. NEUROMUSCULAR RE-EDUCATION: (see chart for minutes):  Exercise/activities per grid below to improve balance, coordination, kinesthetic sense, posture, pain, and proprioception. Required minimal visual, verbal and tactile cues to promote static and dynamic balance in standing. MANUAL THERAPY: (see chart for minutes): Joint mobilization, Soft tissue mobilization, skin mobilization, and muscle energy techniques were utilized and necessary because of the patient's restricted joint motion, restricted motion of soft tissue and pain . MODALITIES: (see chart for minutes):      *  Cold Pack Therapy in order to provide analgesia.      Date: 5/14/21 (visit 11) 5/18/21 (visit 12) 5/21/21 (visit 13)  5/25/21 (visit 14)    Modalities:              Therapeutic Exercise: 45 min  42 min 40 min 40 min    Bike using handles 6'  [address] 2x3 holds  Lateral hip distraction with belt 10x  SKTC x2 holds  LTR with legs crossed x5 B each with legs crossed both ways  Wall wash 2x1'  Cross body shoulder stretch 2' total  Active hip flexion leaning on wall with knee hug to chest and slow lower 10x  Lunge 10x  Squat 10x to floor Bike using handles 5'  SKTC 2x3 holds  LTR with legs crossed x5 B each with legs crossed both ways  Lunge: 2x10 B  Squat: x3 in full ROM  Child's pose: x3 holds   SL RDL: x5 B  Sleeper stretch: x3 with clinician fixating shoulder blade  Bike using handles 5'  SKTC 2x3 holds  LTR with legs crossed x5 B each with legs crossed both ways  Bridge: x10  SL bridge: 2x10 B  Side plank + 3 reps of hip abduction: 2x3 B   Cross leg forward stretch: x3 holds   Lunge: 2x10 B Bike using handles 5'  SKTC x3 holds  LTR with legs crossed x5 B each with legs crossed both ways  Bridge: 2x10  Hip abduction: 2x10 B  SLR: 2x10 B  Lunge: 2x5 B   Neuromuscular re-education              Manual Therapy:              Therapeutic Activities:                Home program: 4/8/21: Georgia Antonio, reverse clam, S/L hip abduction, cross body shoulder stretch, sleeper stretch, doorway stretch  4/13/21: wall stretching, band pull-apart, child's pose, lift off, hip flexion stretches, SL RDL  4/20/21: LTR with legs crossed, cross body stretch with trunk rotation, hip hike     [facility] Portal  Treatment/Session Summary:    · Response to Treatment: Lower intensity exercise performed today as a precaution due to pt irritating R hip but no exercises resulted in any pain. She demonstrates significant improvement in R shoulder and hip ROM and functional use. She still has tightness reaching behind back and flexing R hip but ROM is [facility] and will continue with stretches. She is being discharged to home program and she confirmed understanding of all exercises and was pleased with her progress.    · Communication/Consultation:  None today  · Equipment provided today: None  · Recommendations: D/c to home program    Total Treatment Billable Duration:  40 minutes     Cassidy Tinajero, PT, DPT

## 2021-07-21 ENCOUNTER — APPOINTMENT (RX ONLY)
Dept: URBAN - METROPOLITAN AREA CLINIC 349 | Facility: CLINIC | Age: 71
Setting detail: DERMATOLOGY
End: 2021-07-21

## 2021-07-21 DIAGNOSIS — L81.4 OTHER MELANIN HYPERPIGMENTATION: ICD-10-CM

## 2021-07-21 DIAGNOSIS — L82.1 OTHER SEBORRHEIC KERATOSIS: ICD-10-CM

## 2021-07-21 DIAGNOSIS — L44.8 OTHER SPECIFIED PAPULOSQUAMOUS DISORDERS: ICD-10-CM

## 2021-07-21 DIAGNOSIS — D17 BENIGN LIPOMATOUS NEOPLASM: ICD-10-CM

## 2021-07-21 DIAGNOSIS — L57.0 ACTINIC KERATOSIS: ICD-10-CM

## 2021-07-21 DIAGNOSIS — D22 MELANOCYTIC NEVI: ICD-10-CM

## 2021-07-21 DIAGNOSIS — Z71.89 OTHER SPECIFIED COUNSELING: ICD-10-CM

## 2021-07-21 DIAGNOSIS — Z12.83 ENCOUNTER FOR SCREENING FOR MALIGNANT NEOPLASM OF SKIN: ICD-10-CM

## 2021-07-21 DIAGNOSIS — D18.0 HEMANGIOMA: ICD-10-CM

## 2021-07-21 PROBLEM — D22.71 MELANOCYTIC NEVI OF RIGHT LOWER LIMB, INCLUDING HIP: Status: ACTIVE | Noted: 2021-07-21

## 2021-07-21 PROBLEM — D22.62 MELANOCYTIC NEVI OF LEFT UPPER LIMB, INCLUDING SHOULDER: Status: ACTIVE | Noted: 2021-07-21

## 2021-07-21 PROBLEM — D18.01 HEMANGIOMA OF SKIN AND SUBCUTANEOUS TISSUE: Status: ACTIVE | Noted: 2021-07-21

## 2021-07-21 PROBLEM — D22.5 MELANOCYTIC NEVI OF TRUNK: Status: ACTIVE | Noted: 2021-07-21

## 2021-07-21 PROBLEM — D17.23 BENIGN LIPOMATOUS NEOPLASM OF SKIN AND SUBCUTANEOUS TISSUE OF RIGHT LEG: Status: ACTIVE | Noted: 2021-07-21

## 2021-07-21 PROBLEM — D17.21 BENIGN LIPOMATOUS NEOPLASM OF SKIN AND SUBCUTANEOUS TISSUE OF RIGHT ARM: Status: ACTIVE | Noted: 2021-07-21

## 2021-07-21 PROCEDURE — 17003 DESTRUCT PREMALG LES 2-14: CPT

## 2021-07-21 PROCEDURE — ? EDUCATIONAL RESOURCES PROVIDED

## 2021-07-21 PROCEDURE — ? OTHER

## 2021-07-21 PROCEDURE — ? COUNSELING

## 2021-07-21 PROCEDURE — ? LIQUID NITROGEN

## 2021-07-21 PROCEDURE — ? SUNSCREEN RECOMMENDATIONS

## 2021-07-21 PROCEDURE — 17000 DESTRUCT PREMALG LESION: CPT

## 2021-07-21 PROCEDURE — 99203 OFFICE O/P NEW LOW 30 MIN: CPT | Mod: 25

## 2021-07-21 ASSESSMENT — LOCATION ZONE DERM
LOCATION ZONE: LEG
LOCATION ZONE: TRUNK
LOCATION ZONE: FACE
LOCATION ZONE: ARM

## 2021-07-21 ASSESSMENT — LOCATION DETAILED DESCRIPTION DERM
LOCATION DETAILED: LEFT LATERAL MALAR CHEEK
LOCATION DETAILED: LEFT POSTERIOR SHOULDER
LOCATION DETAILED: RIGHT ANTERIOR PROXIMAL THIGH
LOCATION DETAILED: RIGHT DISTAL DORSAL FOREARM
LOCATION DETAILED: RIGHT DISTAL PRETIBIAL REGION
LOCATION DETAILED: RIGHT DISTAL RADIAL DORSAL FOREARM
LOCATION DETAILED: PERIUMBILICAL SKIN
LOCATION DETAILED: LEFT INFERIOR LATERAL MALAR CHEEK
LOCATION DETAILED: LEFT PROXIMAL DORSAL FOREARM
LOCATION DETAILED: RIGHT INFERIOR CENTRAL MALAR CHEEK
LOCATION DETAILED: LEFT PROXIMAL PRETIBIAL REGION
LOCATION DETAILED: LEFT SUPERIOR MEDIAL MIDBACK
LOCATION DETAILED: RIGHT ANTERIOR DISTAL UPPER ARM
LOCATION DETAILED: LEFT INFERIOR CENTRAL MALAR CHEEK
LOCATION DETAILED: RIGHT PROXIMAL PRETIBIAL REGION
LOCATION DETAILED: LEFT DISTAL PRETIBIAL REGION
LOCATION DETAILED: LEFT ANTERIOR DISTAL THIGH

## 2021-07-21 ASSESSMENT — LOCATION SIMPLE DESCRIPTION DERM
LOCATION SIMPLE: LEFT LOWER BACK
LOCATION SIMPLE: LEFT SHOULDER
LOCATION SIMPLE: RIGHT FOREARM
LOCATION SIMPLE: RIGHT CHEEK
LOCATION SIMPLE: LEFT CHEEK
LOCATION SIMPLE: LEFT PRETIBIAL REGION
LOCATION SIMPLE: RIGHT UPPER ARM
LOCATION SIMPLE: RIGHT PRETIBIAL REGION
LOCATION SIMPLE: LEFT FOREARM
LOCATION SIMPLE: LEFT THIGH
LOCATION SIMPLE: ABDOMEN
LOCATION SIMPLE: RIGHT THIGH

## 2021-07-21 NOTE — PROCEDURE: OTHER
Other (Free Text): Discussed treatment options with patient. Mentioned we could treat it with a topical chemo cream. Explained the reaction she would experience and that she would have to stay out of the sun while using the topical.
Note Text (......Xxx Chief Complaint.): This diagnosis correlates with the
Render Risk Assessment In Note?: yes
Detail Level: Zone
Other (Free Text): Mentioned that we could remove lesion. Suggested patient monitor the lesion and if it continues to get larger or bothersome, we could excise the lesion. Patient expressed understanding\\n\\nPatient stated the lesion on her arm bothers her. Mentioned she would have a scar left in its place.

## 2021-07-21 NOTE — PROCEDURE: LIQUID NITROGEN
Detail Level: Detailed
Consent: The patient's consent was obtained including but not limited to risks of crusting, scabbing, blistering, scarring, darker or lighter pigmentary change, recurrence, incomplete removal and infection.
Post-Care Instructions: I reviewed with the patient in detail post-care instructions. Patient is to wear sunprotection, and avoid picking at any of the treated lesions. Pt may apply Vaseline to crusted or scabbing areas.
Number Of Freeze-Thaw Cycles: 1 freeze-thaw cycle
Render Note In Bullet Format When Appropriate: No
Show Applicator Variable?: Yes
Duration Of Freeze Thaw-Cycle (Seconds): 3

## 2021-07-21 NOTE — HPI: SKIN LESIONS
How Severe Is Your Skin Lesion?: mild
Have Your Skin Lesions Been Treated?: not been treated
Is This A New Presentation, Or A Follow-Up?: Skin Lesion
Additional History: Patient stated she has a lesion on her right leg that appeared two to three weeks ago after she hit it on something. She stated it was a large bump under the size but had recently decreased in size.

## 2021-07-21 NOTE — PROCEDURE: SUNSCREEN RECOMMENDATIONS

## 2021-09-16 PROBLEM — M51.36 DDD (DEGENERATIVE DISC DISEASE), LUMBAR: Status: ACTIVE | Noted: 2021-09-16

## 2021-09-16 PROBLEM — M87.051 AVASCULAR NECROSIS OF FEMORAL HEAD, RIGHT (HCC): Status: ACTIVE | Noted: 2021-09-16

## 2021-09-16 PROBLEM — M25.551 RIGHT HIP PAIN: Status: ACTIVE | Noted: 2021-09-16

## 2021-09-17 ENCOUNTER — HOSPITAL ENCOUNTER (OUTPATIENT)
Dept: LAB | Age: 71
Discharge: HOME | End: 2021-09-17
Attending: ORTHOPAEDIC SURGERY
Payer: MEDICARE

## 2021-09-17 LAB
BASOPHILS # BLD: 0.1 K/UL (ref 0–0.2)
BASOPHILS NFR BLD: 1 % (ref 0–2)
CRP SERPL-MCNC: <0.3 MG/DL (ref 0–0.9)
DIFFERENTIAL METHOD BLD: ABNORMAL
EOSINOPHIL # BLD: 0.3 K/UL (ref 0–0.8)
EOSINOPHIL NFR BLD: 5 % (ref 0.5–7.8)
ERYTHROCYTE [DISTWIDTH] IN BLOOD BY AUTOMATED COUNT: 13.2 % (ref 11.9–14.6)
ERYTHROCYTE [SEDIMENTATION RATE] IN BLOOD: 15 MM/HR (ref 0–30)
HCT VFR BLD AUTO: 36.8 % (ref 35.8–46.3)
HGB BLD-MCNC: 11.9 G/DL (ref 11.7–15.4)
IMM GRANULOCYTES # BLD AUTO: 0 K/UL (ref 0–0.5)
IMM GRANULOCYTES NFR BLD AUTO: 0 % (ref 0–5)
LYMPHOCYTES # BLD: 1.8 K/UL (ref 0.5–4.6)
LYMPHOCYTES NFR BLD: 30 % (ref 13–44)
MCH RBC QN AUTO: 29.9 PG (ref 26.1–32.9)
MCHC RBC AUTO-ENTMCNC: 32.3 G/DL (ref 31.4–35)
MCV RBC AUTO: 92.5 FL (ref 79.6–97.8)
MONOCYTES # BLD: 0.5 K/UL (ref 0.1–1.3)
MONOCYTES NFR BLD: 9 % (ref 4–12)
NEUTS SEG # BLD: 3.4 K/UL (ref 1.7–8.2)
NEUTS SEG NFR BLD: 56 % (ref 43–78)
NRBC # BLD: 0 K/UL (ref 0–0.2)
PLATELET # BLD AUTO: 276 K/UL (ref 150–450)
PMV BLD AUTO: 10.3 FL (ref 9.4–12.3)
RBC # BLD AUTO: 3.98 M/UL (ref 4.05–5.2)
WBC # BLD AUTO: 6.1 K/UL (ref 4.3–11.1)

## 2021-09-17 PROCEDURE — 85025 COMPLETE CBC W/AUTO DIFF WBC: CPT

## 2021-09-17 PROCEDURE — 85652 RBC SED RATE AUTOMATED: CPT

## 2021-09-17 PROCEDURE — 86140 C-REACTIVE PROTEIN: CPT

## 2021-09-17 PROCEDURE — 36415 COLL VENOUS BLD VENIPUNCTURE: CPT

## 2021-10-11 RX ORDER — CEFAZOLIN SODIUM/WATER 2 G/20 ML
2 SYRINGE (ML) INTRAVENOUS ONCE
Status: CANCELLED | OUTPATIENT
Start: 2021-10-11 | End: 2021-10-11

## 2021-10-12 ENCOUNTER — HOSPITAL ENCOUNTER (OUTPATIENT)
Dept: SURGERY | Age: 71
Discharge: HOME | End: 2021-10-12
Payer: MEDICARE

## 2021-10-12 ENCOUNTER — HOSPITAL ENCOUNTER (OUTPATIENT)
Dept: REHABILITATION | Age: 71
Discharge: HOME | End: 2021-10-12
Payer: MEDICARE

## 2021-10-12 LAB
ANION GAP SERPL CALC-SCNC: 5 MMOL/L (ref 7–16)
APTT PPP: 28 SEC (ref 24.1–35.1)
ATRIAL RATE: 67 BPM
BACTERIA SPEC CULT: NORMAL
BASOPHILS # BLD: 0.1 K/UL (ref 0–0.2)
BASOPHILS NFR BLD: 1 % (ref 0–2)
BUN SERPL-MCNC: 14 MG/DL (ref 8–23)
CALCIUM SERPL-MCNC: 9.1 MG/DL (ref 8.3–10.4)
CALCULATED P AXIS, ECG09: 78 DEGREES
CALCULATED R AXIS, ECG10: 80 DEGREES
CALCULATED T AXIS, ECG11: 71 DEGREES
CHLORIDE SERPL-SCNC: 106 MMOL/L (ref 98–107)
CO2 SERPL-SCNC: 28 MMOL/L (ref 21–32)
CREAT SERPL-MCNC: 0.78 MG/DL (ref 0.6–1)
DIAGNOSIS, 93000: NORMAL
DIFFERENTIAL METHOD BLD: ABNORMAL
EOSINOPHIL # BLD: 0.2 K/UL (ref 0–0.8)
EOSINOPHIL NFR BLD: 4 % (ref 0.5–7.8)
ERYTHROCYTE [DISTWIDTH] IN BLOOD BY AUTOMATED COUNT: 13.2 % (ref 11.9–14.6)
EST. AVERAGE GLUCOSE BLD GHB EST-MCNC: 111 MG/DL
GLUCOSE SERPL-MCNC: 168 MG/DL (ref 65–100)
HBA1C MFR BLD: 5.5 % (ref 4.2–6.3)
HCT VFR BLD AUTO: 36.2 % (ref 35.8–46.3)
HGB BLD-MCNC: 11.6 G/DL (ref 11.7–15.4)
IMM GRANULOCYTES # BLD AUTO: 0 K/UL (ref 0–0.5)
IMM GRANULOCYTES NFR BLD AUTO: 0 % (ref 0–5)
INR PPP: 1
LYMPHOCYTES # BLD: 1.8 K/UL (ref 0.5–4.6)
LYMPHOCYTES NFR BLD: 31 % (ref 13–44)
MCH RBC QN AUTO: 29.7 PG (ref 26.1–32.9)
MCHC RBC AUTO-ENTMCNC: 32 G/DL (ref 31.4–35)
MCV RBC AUTO: 92.6 FL (ref 79.6–97.8)
MONOCYTES # BLD: 0.5 K/UL (ref 0.1–1.3)
MONOCYTES NFR BLD: 8 % (ref 4–12)
NEUTS SEG # BLD: 3.3 K/UL (ref 1.7–8.2)
NEUTS SEG NFR BLD: 56 % (ref 43–78)
NRBC # BLD: 0 K/UL (ref 0–0.2)
P-R INTERVAL, ECG05: 144 MS
PLATELET # BLD AUTO: 274 K/UL (ref 150–450)
PMV BLD AUTO: 10.6 FL (ref 9.4–12.3)
POTASSIUM SERPL-SCNC: 3.6 MMOL/L (ref 3.5–5.1)
PROTHROMBIN TIME: 13.3 SEC (ref 12.6–14.5)
Q-T INTERVAL, ECG07: 424 MS
QRS DURATION, ECG06: 80 MS
QTC CALCULATION (BEZET), ECG08: 448 MS
RBC # BLD AUTO: 3.91 M/UL (ref 4.05–5.2)
SERVICE CMNT-IMP: NORMAL
SODIUM SERPL-SCNC: 139 MMOL/L (ref 136–145)
VENTRICULAR RATE, ECG03: 67 BPM
WBC # BLD AUTO: 6 K/UL (ref 4.3–11.1)

## 2021-10-12 PROCEDURE — 85025 COMPLETE CBC W/AUTO DIFF WBC: CPT

## 2021-10-12 PROCEDURE — 94760 N-INVAS EAR/PLS OXIMETRY 1: CPT

## 2021-10-12 PROCEDURE — 77030027138 HC INCENT SPIROMETER -A

## 2021-10-12 PROCEDURE — 36415 COLL VENOUS BLD VENIPUNCTURE: CPT

## 2021-10-12 PROCEDURE — 80048 BASIC METABOLIC PNL TOTAL CA: CPT

## 2021-10-12 PROCEDURE — 93005 ELECTROCARDIOGRAM TRACING: CPT

## 2021-10-12 PROCEDURE — 87641 MR-STAPH DNA AMP PROBE: CPT

## 2021-10-12 PROCEDURE — 97161 PT EVAL LOW COMPLEX 20 MIN: CPT

## 2021-10-12 PROCEDURE — 83036 HEMOGLOBIN GLYCOSYLATED A1C: CPT

## 2021-10-12 PROCEDURE — 85730 THROMBOPLASTIN TIME PARTIAL: CPT

## 2021-10-12 PROCEDURE — 85610 PROTHROMBIN TIME: CPT

## 2021-10-12 RX ORDER — LANOLIN ALCOHOL/MO/W.PET/CERES
1000 CREAM (GRAM) TOPICAL DAILY
COMMUNITY

## 2021-10-12 RX ORDER — LEVOTHYROXINE SODIUM 100 UG/1
100 TABLET ORAL
COMMUNITY

## 2021-10-12 RX ORDER — CHOLECALCIFEROL (VITAMIN D3) 125 MCG
100 CAPSULE ORAL EVERY OTHER DAY
COMMUNITY
End: 2021-11-05

## 2021-10-12 NOTE — PERIOP NOTES
PLEASE CONTINUE TAKING ALL PRESCRIPTION MEDICATIONS UP TO THE DAY OF SURGERY UNLESS OTHERWISE DIRECTED BELOW. DISCONTINUE all vitamins and supplements 21 days prior to surgery. DISCONTINUE Non-Steriodal Anti-Inflammatory (NSAIDS) such as Advil and Aleve 5 days prior to surgery. Home Medications to take  the day of surgery    Levothyroxine, rosuvastatin (if day to take)           Home Medications   to Hold   Stop vitamins and supplements now         Comments    Covid test 11/1/21 at 9:30 AM  @ [facility],[address] Floor, [facility]    On the day before surgery please take Acetaminophen 1000mg in the morning and then again before bed. You may substitute for Tylenol 650 mg.    Bring C-pap, Incentive spirometer and Sally-hex wash to the hospital on the day of surgery. Please do not bring home medications with you on the day of surgery unless otherwise directed by your nurse. If you are instructed to bring home medications, please give them to your nurse as they will be administered by the nursing staff. If you have any questions, please call [facility] [phone]. A copy of this note was provided to the patient for reference.

## 2021-10-12 NOTE — PROGRESS NOTES
Valeri Padilla  : [birth year](35 y.o.) [facility] at [address] [address].  [phone]       Physical Therapy Prehab Plan of Treatment and Evaluation Summary:10/12/2021    ICD-10: Treatment Diagnosis:   Pain in Right Hip (M25.551)  Stiffness of Right Hip, Not elsewhere classified (M25.651)  Difficulty in walking, Not elsewhere classified (R26.2)  Precautions/Allergies:   Lisinopril and Estradiol  MEDICAL/REFERRING DIAGNOSIS:  Unilateral primary osteoarthritis, right hip [M16.11]  REFERRING PHYSICIAN: Karen Otoole MD  DATE OF SURGERY: 11/3/21    Assessment:   Comments:  Pt. Plans to go home with  who is here today. She had a right hip pinning last year. Her  has had bilateral tka's and a ambar. PROBLEM LIST (Impacting functional limitations):  Ms. Christian Gutierres presents with the following right lower extremity(s) problems:  Strength  Range of Motion  Home Exercise Program  Pain   INTERVENTIONS PLANNED:  Home Exercise Program  Educational Discussion      TREATMENT PLAN: Effective Dates: 10/12/2021 TO 10/12/2021. Frequency/Duration: Patient to continue to perform home exercise program at least twice per day up until her surgery. GOALS: (Goals have been discussed and agreed upon with patient.)  Discharge Goals: Time Frame: 1 Day  Patient will demonstrate independence with a home exercise program designed to increase strength, range of motion and pain control to minimize functional deficits and optimize patient for total joint replacement. Rehabilitation Potential For Stated Goals: Good  Regarding [facility] therapy, I certify that the treatment plan above will be carried out by a therapist or under their direction.   Thank you for this referral,  Clement Salas, PT               HISTORY:   Present Symptoms:  Pain Intensity 1:  (8 at worst)  Pain Location 1: Hip   History of Present Injury/Illness (Reason for Referral):  Medical/Referring Diagnosis: Unilateral primary osteoarthritis, right hip [M16.11]   Past Medical History/Comorbidities:   Ms. Tawanda Flynn  has a past medical history of Arthritis, Fractured hip ([facility] [address] (11/2020), Fractured shoulder (11/2020), Hyperlipidemia, Hypothyroidism, and Sleep apnea. Ms. Tawanda Flynn  has a past surgical history that includes hx septoplasty (1991); hx refractive surgery (04/2000); and hx orthopaedic (11/2020). Social History/Living Environment:   Home Environment: Private residence  # Steps to Enter: 0  One/Two Story Residence: Two story (basement)  # of Interior Steps: 16  Interior Rails: Left  Lift Chair Available: No  Living Alone: No  Support Systems: Spouse/Significant Other  Patient Expects to be Discharged to[de-identified] [employer]  Current DME Used/Available at Home: Walker, rolling;Cane, straight;Crutches; Shower chair;Commode, bedside  Tub or Shower Type: Shower    Work/Activity:  retired  Dominant Side:  RIGHT  Current Medications:  See Pre-assessment nursing note   Number of Personal Factors/Comorbidities that affect the Plan of Care: 1-2: MODERATE COMPLEXITY   EXAMINATION:   ADLs (Current Functional Status):   Ambulation:  [] Independent  [] Walk Indoors Only  [] Walk Outdoors  [x] Use Assistive Device  [] Use Wheelchair Only Dressing:  [x] Independent  Requires Assistance from Someone for:  [] Sock/Shoes  [] Pants  [] Everything   Bathing/Showering:   [x] Independent  [] Requires Assistance from Someone  [] Sponge Bath Only Household Activities:  [] Routine house and yard work  [x] Light Housework Only  [] None   Observation/Orthostatic Postural Assessment:       ROM/Flexibility:   Gross Assessment: Yes  AROM: Within functional limits (left LE)                       RLE Assessment  RLE Assessment (WDL): Exceptions to WDL (right hip 3/5)  RLE AROM  R Hip Flexion: 100  R Hip ABduction: 10  R Knee Extension: 0   Strength:   Gross Assessment: Yes  Strength:  Within functional limits (left LE) Functional Mobility:    Gross Assessment: Yes    Gait Description (WDL): Exceptions to WDL  Stand to Sit: Additional time, Modified independent  Sit to Stand: Modified independent, Additional time  Distance (ft): 200 Feet (ft)  Ambulation - Level of Assistance: Modified independent; Additional time  Assistive Device: Cane, straight  Speed/Jaleesa: Delayed  Step Length: Left shortened;Right shortened  Stance: Right decreased  Gait Abnormalities: Antalgic          Balance:    Sitting: Intact  Standing: With support   Body Structures Involved:  Bones  Joints  Muscles  Ligaments Body Functions Affected: Movement Related Activities and Participation Affected: Mobility   Number of elements that affect the Plan of Care: 3: MODERATE COMPLEXITY   CLINICAL PRESENTATION:   Presentation: Stable and uncomplicated: LOW COMPLEXITY   CLINICAL DECISION MAKING:   Tool Used: Hip dysfunction and Osteoarthritis Outcome Score for Joint Replacement (HOOS, JR)  Score:  Initial: 15 (Interval: 43.335) 10/12/2021 Most Recent: TBD   Interpretation of Score: The HOOS, JR contains 6 items from the original HOOS survey. Items are coded from 0 to 4, none to extreme respectively. [facility] is scored by summing the raw response (range 0-24) and then converting it to an interval score using the table provided below. The interval score ranges from 0 to 100 where 0 represents total hip disability and 100 represents perfect hip health. Medical Necessity:   Ms. Nikole Win is expected to optimize her lower extremity strength and ROM in preparation for joint replacement surgery. Reason for Services/Other Comments:  Achieve baseline assesment of musculoskeletal system, functional mobility and home environment. , educate in PT HEP in preparation for surgery, educate in hospital plan of care.    Use of outcome tool(s) and clinical judgement create a POC that gives a: Clear prediction of patient's progress: LOW COMPLEXITY   TREATMENT: Treatment/Session Assessment:  Patient was instructed in PT- HEP to increase strength and ROM in LEs. Answered all questions. Post session pain:  Hip pain  Compliance with Program/Exercises: compliant most of the time.   Total Treatment Duration:  PT Patient Time In/Time Out  Time In: 1215  Time Out: Angel Weller, PT

## 2021-10-13 VITALS
HEART RATE: 70 BPM | HEIGHT: 66 IN | WEIGHT: 109.8 LBS | RESPIRATION RATE: 16 BRPM | TEMPERATURE: 98.3 F | OXYGEN SATURATION: 99 % | DIASTOLIC BLOOD PRESSURE: 73 MMHG | BODY MASS INDEX: 17.64 KG/M2 | SYSTOLIC BLOOD PRESSURE: 163 MMHG

## 2021-10-13 NOTE — PROGRESS NOTES
10/12/21 1200   Oxygen Therapy   O2 Sat (%) 100 %   Pulse via Oximetry 75 beats per minute   O2 Device None (Room air)   Pre-Treatment   Breath Sounds Bilateral Clear   Pre FEV1 (liters) 2.4 liters   % Predicted 97     Initial respiratory Assessment completed with pt. Pt was interviewed and evaluated in Joint camp prior to surgery. Patient ID:  Betty [place]  [id number]  80 y.o.  1950  Surgeon: Dr. Mark Bloom  Date of Surgery: 11/3/2021  Procedure: Total Right Hip Arthroplasty  Primary Care Physician: Nora Li [phone]  Specialists:     Pt taught proper COUGH technique  DIAPHRAGMATIC BREATHING EXERCISE INSTRUCTIONS GIVEN    History of smokin/4 PPD FOR 4 YEARS                 Quit date:  7/10/1978       Secondhand smoke:FATHER    Past procedures with Oxygen desaturation or delayed awakening:DENIES    Past Medical History:   Diagnosis Date    Arthritis     Fractured hip ([address] 2020    right    Fractured shoulder 2020    right    Hyperlipidemia     Hypothyroidism     Sleep apnea     C-pap nightly         Respiratory history:DENIES SOB                                                                  Respiratory meds:  DENIES    FAMILY PRESENT:             PAST SLEEP STUDY:                     DENIES  HX OF DAMARIS:                                      DENIES  DAMARIS assessment:                                               SLEEPS ON       BACK           PHYSICAL EXAM   Body mass index is 17.99 kg/m².    Visit Vitals  BP (!) 163/73 (BP 1 Location: Left upper arm, BP Patient Position: At rest;Sitting)   Pulse 70   Temp 98.3 °F (36.8 °C)   Resp 16   Ht 5' 5.5\" (1.664 m)   Wt 49.8 kg (109 lb 12.8 oz)   SpO2 99%   BMI 17.99 kg/m²     Neck circumference:   32   cm    Loud snoring:                                                 YES            Witnessed apnea or wakening gasping or choking:             APNEA  Awakens with headaches: DENIES  Morning or daytime tiredness/ sleepiness:                      DENIES           Dry mouth or sore throat in morning:            YES                                               Holguin stage:  3-4                                   SACS score:2  Stop Bang   STOP-BANG  Does the patient snore loudly (louder than talking or loud enough to be heard through closed doors)?: Yes  Does the patient often feel tired, fatigued, or sleepy during the daytime, even after a \"good\" night's sleep?: No  Has anyone ever observed the patient stop breathing during their sleep? : Yes  Does the patient have or are they being treated for high blood pressure?: No  Is the patient's BMI greater than 35?: No  Is your neck circumference greater than 17 inches (Male) or 16 inches (Female)?: No  Is the patient older than 48?: Yes  Is the patient male?: No  DAMARIS Score: 3  Has the patient been referred to Sleep Medicine?: No  Has the patient previously been diagnosed with Obstructive Sleep Apnea?: No                            CS HS  RESPIRATORY ASSESSMENT Q SHIFT   O2 PRN    ALBUTEROL  NEBULIZER Q6 PRN WHEEZING                                            Referrals:    Pt. Phone Number:

## 2021-10-27 NOTE — H&P (VIEW-ONLY)
[address] [facility]  Pre Operative History and Physical Exam    Patient ID:  Ewa Patel  [id number]  37 y.o.  1950    Today: October 27, 2021           CC:  Right hip pain    HPI:   The patient has AVN of right femoral head in prior right hip pinning. The patient was evaluated and examined during a consultation prior to this office visit. There have been no changes to the patient's orthopedic condition since the initial consultation. The patient has failed previous conservative treatment for this condition including antiinflammatories , and lifestyle modifications. The necessity for joint replacement is present. The patient will be admitted the day of surgery for right hip replacement    Past Medical/Surgical History:  Past Medical History:   Diagnosis Date    Arthritis     Fractured hip ([address] 11/2020    right    Fractured shoulder 11/2020    right    Hyperlipidemia     Hypothyroidism     Sleep apnea     C-pap nightly      Past Surgical History:   Procedure Laterality Date    HX ORTHOPAEDIC  11/2020    Rt hip fracture repair    HX REFRACTIVE SURGERY  04/2000    HX SEPTOPLASTY  1991        Allergies: Allergies   Allergen Reactions    Lisinopril Other (comments)     Dizzy     Estradiol Other (comments)     Didn't work         Physical Exam:   General: NAD, Alert, Oriented, Appears their stated age     [de-identified]: NC/AT    Skin: No rashes, lesions or wounds seen      Psych: normal affect      Heart: Regular Rate, Rhythm     Lungs: unlabored respirations, no wheezing    Abdomen: Soft and non-distended     Ortho: Pain with limited ROM of the right hip    Neuro: no focal defects, moving extremities equally    Lymph: no lymphadenopathy     Meds:   Current Outpatient Medications   Medication Sig    cyanocobalamin 1,000 mcg tablet Take 1,000 mcg by mouth daily.  co-enzyme Q-10 (Co Q-10) 100 mg capsule Take 100 mg by mouth every other day.     levothyroxine (Synthroid) 100 mcg tablet Take 100 mcg by mouth Daily (before breakfast). Takes 100 mcg on Sunday and Wednesday    levothyroxine (SYNTHROID) 88 mcg tablet Takes 88 mcg on Monday, Tuesday, Thursday, Friday, Saturday    rosuvastatin (CRESTOR) 5 mg tablet Take 5 mg by mouth every other day.  aspirin delayed-release 81 mg tablet Take 81 mg by mouth every other day.  cholecalciferol, vitamin D3, 50 mcg (2,000 unit) tab Take 250 mcg by mouth every other day. No current facility-administered medications for this visit. Labs:  Hospital Outpatient Visit on 10/12/2021   Component Date Value Ref Range Status    WBC 10/12/2021 6.0  4.3 - 11.1 K/uL Final    RBC 10/12/2021 3.91* 4.05 - 5.2 M/uL Final    HGB 10/12/2021 11.6* 11.7 - 15.4 g/dL Final    HCT 10/12/2021 36.2  35.8 - 46.3 % Final    MCV 10/12/2021 92.6  79.6 - 97.8 FL Final    MCH 10/12/2021 29.7  26.1 - 32.9 PG Final    MCHC 10/12/2021 32.0  31.4 - 35.0 g/dL Final    RDW 10/12/2021 13.2  11.9 - 14.6 % Final    PLATELET 71/56/9144 750  150 - 450 K/uL Final    MPV 10/12/2021 10.6  9.4 - 12.3 FL Final    ABSOLUTE NRBC 10/12/2021 0.00  0.0 - 0.2 K/uL Final    **Note: Absolute NRBC parameter is now reported with Hemogram**    DF 10/12/2021 AUTOMATED    Final    NEUTROPHILS 10/12/2021 56  43 - 78 % Final    LYMPHOCYTES 10/12/2021 31  13 - 44 % Final    MONOCYTES 10/12/2021 8  4.0 - 12.0 % Final    EOSINOPHILS 10/12/2021 4  0.5 - 7.8 % Final    BASOPHILS 10/12/2021 1  0.0 - 2.0 % Final    IMMATURE GRANULOCYTES 10/12/2021 0  0.0 - 5.0 % Final    ABS. NEUTROPHILS 10/12/2021 3.3  1.7 - 8.2 K/UL Final    ABS. LYMPHOCYTES 10/12/2021 1.8  0.5 - 4.6 K/UL Final    ABS. MONOCYTES 10/12/2021 0.5  0.1 - 1.3 K/UL Final    ABS. EOSINOPHILS 10/12/2021 0.2  0.0 - 0.8 K/UL Final    ABS. BASOPHILS 10/12/2021 0.1  0.0 - 0.2 K/UL Final    ABS. IMM. GRANS.  10/12/2021 0.0  0.0 - 0.5 K/UL Final    Prothrombin time 10/12/2021 13.3  12.6 - 14.5 sec Final    INR 10/12/2021 1.0    Final Comment: Suggested therapeutic INR range:  Venous thrombosis and embolus  2.0-3.0  Prosthetic heart valve         2.5-3.5  ** Note new reference range and method **      aPTT 10/12/2021 28.0  24.1 - 35.1 SEC Final    Comment: Heparin Therapeutic Range = 74 - 123 seconds  In addition to factor deficiency, monitoring heparin therapy, etc., evaluation of a prolonged aPTT result should include consideration of preanalytic variables such as heparin flush contamination, specimen integrity issues, etc.      Sodium 10/12/2021 139  136 - 145 mmol/L Final    Potassium 10/12/2021 3.6  3.5 - 5.1 mmol/L Final    Chloride 10/12/2021 106  98 - 107 mmol/L Final    CO2 10/12/2021 28  21 - 32 mmol/L Final    Anion gap 10/12/2021 5* 7 - 16 mmol/L Final    Glucose 10/12/2021 168* 65 - 100 mg/dL Final    Comment: 47 - 60 mg/dl Consistent with, but not fully diagnostic of hypoglycemia. 101 - 125 mg/dl Impaired fasting glucose/consistent with pre-diabetes mellitus  > 126 mg/dl Fasting glucose consistent with overt diabetes mellitus      BUN 10/12/2021 14  8 - 23 MG/DL Final    Creatinine 10/12/2021 0.78  0.6 - 1.0 MG/DL Final    GFR est AA 10/12/2021 >60  >60 ml/min/1.73m2 Final    GFR est non-AA 10/12/2021 >60  >60 ml/min/1.73m2 Final    Comment: (NOTE)  Estimated GFR is calculated using the Modification of Diet in Renal   Disease (MDRD) Study equation, reported for both  Americans   (GFRAA) and non- Americans (GFRNA), and normalized to 1.73m2   body surface area. The physician must decide which value applies to   the patient. The MDRD study equation should only be used in   individuals age 25 or older. It has not been validated for the   following: pregnant women, patients with serious comorbid conditions,   or on certain medications, or persons with extremes of body size,   muscle mass, or nutritional status.       Calcium 10/12/2021 9.1  8.3 - 10.4 MG/DL Final    Hemoglobin A1c 10/12/2021 5.5  4.2 - 6.3 % Final    Est. average glucose 10/12/2021 111  mg/dL Final    Comment: (NOTE)  The eAG should be interpreted with patient characteristics in mind   since ethnicity, interindividual differences, red cell lifespan,   variation in rates of glycation, etc. may affect the validity of the   calculation.  Special Requests: 10/12/2021 NO SPECIAL REQUESTS    Final    Culture result: 10/12/2021 SA target not detected. A MRSA NEGATIVE, SA NEGATIVE test result does not preclude MRSA or SA nasal colonization.     Final    Ventricular Rate 10/12/2021 67  BPM Final    Atrial Rate 10/12/2021 67  BPM Final    P-R Interval 10/12/2021 144  ms Final    QRS Duration 10/12/2021 80  ms Final    Q-T Interval 10/12/2021 424  ms Final    QTC Calculation (Bezet) 10/12/2021 448  ms Final    Calculated P Axis 10/12/2021 78  degrees Final    Calculated R Axis 10/12/2021 80  degrees Final    Calculated T Axis 10/12/2021 71  degrees Final    Diagnosis 10/12/2021    Final                    Value:Sinus rhythm with occasional Premature ventricular complexes  Minimal voltage criteria for LVH, may be normal variant  Borderline ECG  No previous ECGs available  Confirmed by ST DAVID RAYMUNDO MD (), VARINDER HASTINGS ([id number]) on 10/12/2021 3:00:20 PM     Hospital Outpatient Visit on 09/17/2021   Component Date Value Ref Range Status    WBC 09/17/2021 6.1  4.3 - 11.1 K/uL Final    RBC 09/17/2021 3.98* 4.05 - 5.2 M/uL Final    HGB 09/17/2021 11.9  11.7 - 15.4 g/dL Final    HCT 09/17/2021 36.8  35.8 - 46.3 % Final    MCV 09/17/2021 92.5  79.6 - 97.8 FL Final    MCH 09/17/2021 29.9  26.1 - 32.9 PG Final    MCHC 09/17/2021 32.3  31.4 - 35.0 g/dL Final    RDW 09/17/2021 13.2  11.9 - 14.6 % Final    PLATELET 03/46/5027 206  150 - 450 K/uL Final    MPV 09/17/2021 10.3  9.4 - 12.3 FL Final    ABSOLUTE NRBC 09/17/2021 0.00  0.0 - 0.2 K/uL Final    **Note: Absolute NRBC parameter is now reported with Hemogram**    DF 09/17/2021 AUTOMATED    Final    NEUTROPHILS 09/17/2021 56  43 - 78 % Final    LYMPHOCYTES 09/17/2021 30  13 - 44 % Final    MONOCYTES 09/17/2021 9  4.0 - 12.0 % Final    EOSINOPHILS 09/17/2021 5  0.5 - 7.8 % Final    BASOPHILS 09/17/2021 1  0.0 - 2.0 % Final    IMMATURE GRANULOCYTES 09/17/2021 0  0.0 - 5.0 % Final    ABS. NEUTROPHILS 09/17/2021 3.4  1.7 - 8.2 K/UL Final    ABS. LYMPHOCYTES 09/17/2021 1.8  0.5 - 4.6 K/UL Final    ABS. MONOCYTES 09/17/2021 0.5  0.1 - 1.3 K/UL Final    ABS. EOSINOPHILS 09/17/2021 0.3  0.0 - 0.8 K/UL Final    ABS. BASOPHILS 09/17/2021 0.1  0.0 - 0.2 K/UL Final    ABS. IMM. GRANS. 09/17/2021 0.0  0.0 - 0.5 K/UL Final    C-Reactive protein 09/17/2021 <0.3  0.0 - 0.9 mg/dL Final    Sed rate, automated 09/17/2021 15  0 - 30 mm/hr Final                 Patient Active Problem List   Diagnosis Code    Avascular necrosis of femoral head, right (HCC) M87.051    Right hip pain M25.551    DDD (degenerative disc disease), lumbar M51.36         Assessment:   1. AVN of right hip in prior right hip pinning. Plan:    1. Proceed with scheduled conversion of right hip pinning to total hip arthroplasty    The patient was counseled at length about the risks of nehemiah Covid-19 during their perioperative period and any recovery window from their procedure. The patient was made aware that nehemiah Covid-19  may worsen their prognosis for recovering from their procedure and lend to a higher morbidity and/or mortality risk. All material risks, benefits, and reasonable alternatives including postponing the procedure were discussed. The patient does  wish to proceed with the procedure at this time.          Signed By: KELIN Us  October 27, 2021

## 2021-11-02 ENCOUNTER — ANESTHESIA EVENT (OUTPATIENT)
Dept: SURGERY | Age: 71
DRG: 470 | End: 2021-11-02
Payer: MEDICARE

## 2021-11-03 ENCOUNTER — ANESTHESIA (OUTPATIENT)
Dept: SURGERY | Age: 71
DRG: 470 | End: 2021-11-03
Payer: MEDICARE

## 2021-11-03 ENCOUNTER — APPOINTMENT (OUTPATIENT)
Dept: GENERAL RADIOLOGY | Age: 71
DRG: 470 | End: 2021-11-03
Attending: PHYSICIAN ASSISTANT
Payer: MEDICARE

## 2021-11-03 ENCOUNTER — HOSPITAL ENCOUNTER (INPATIENT)
Age: 71
LOS: 2 days | Discharge: HOME HEALTH | DRG: 470 | End: 2021-11-05
Attending: ORTHOPAEDIC SURGERY | Admitting: ORTHOPAEDIC SURGERY
Payer: MEDICARE

## 2021-11-03 DIAGNOSIS — Z96.641 STATUS POST RIGHT HIP REPLACEMENT: Primary | ICD-10-CM

## 2021-11-03 PROBLEM — M87.059 AVN OF FEMUR (HCC): Status: ACTIVE | Noted: 2021-11-03

## 2021-11-03 PROBLEM — E03.9 ACQUIRED HYPOTHYROIDISM: Status: ACTIVE | Noted: 2021-11-03

## 2021-11-03 PROBLEM — E78.5 DYSLIPIDEMIA: Status: ACTIVE | Noted: 2021-11-03

## 2021-11-03 LAB — HGB BLD-MCNC: 9.4 G/DL (ref 11.7–15.4)

## 2021-11-03 PROCEDURE — 74011000250 HC RX REV CODE- 250: Performed by: PHYSICIAN ASSISTANT

## 2021-11-03 PROCEDURE — 74011250636 HC RX REV CODE- 250/636: Performed by: ANESTHESIOLOGY

## 2021-11-03 PROCEDURE — 77030008462 HC STPLR SKN PROX J&J -A: Performed by: ORTHOPAEDIC SURGERY

## 2021-11-03 PROCEDURE — 74011250636 HC RX REV CODE- 250/636: Performed by: PHYSICIAN ASSISTANT

## 2021-11-03 PROCEDURE — 77030018836 HC SOL IRR NACL ICUM -A: Performed by: ORTHOPAEDIC SURGERY

## 2021-11-03 PROCEDURE — C1776 JOINT DEVICE (IMPLANTABLE): HCPCS | Performed by: ORTHOPAEDIC SURGERY

## 2021-11-03 PROCEDURE — 97165 OT EVAL LOW COMPLEX 30 MIN: CPT

## 2021-11-03 PROCEDURE — 77030012935 HC DRSG AQUACEL BMS -B: Performed by: ORTHOPAEDIC SURGERY

## 2021-11-03 PROCEDURE — 85018 HEMOGLOBIN: CPT

## 2021-11-03 PROCEDURE — 94760 N-INVAS EAR/PLS OXIMETRY 1: CPT

## 2021-11-03 PROCEDURE — 65270000029 HC RM PRIVATE

## 2021-11-03 PROCEDURE — 77030003665 HC NDL SPN BBMI -A: Performed by: NURSE ANESTHETIST, CERTIFIED REGISTERED

## 2021-11-03 PROCEDURE — 76060000037 HC ANESTHESIA 3 TO 3.5 HR: Performed by: ORTHOPAEDIC SURGERY

## 2021-11-03 PROCEDURE — 77030008459 HC STPLR SKN COOP -B: Performed by: ORTHOPAEDIC SURGERY

## 2021-11-03 PROCEDURE — 74011000250 HC RX REV CODE- 250: Performed by: ANESTHESIOLOGY

## 2021-11-03 PROCEDURE — 0QP604Z REMOVAL OF INTERNAL FIXATION DEVICE FROM RIGHT UPPER FEMUR, OPEN APPROACH: ICD-10-PCS | Performed by: ORTHOPAEDIC SURGERY

## 2021-11-03 PROCEDURE — 74011000250 HC RX REV CODE- 250: Performed by: NURSE ANESTHETIST, CERTIFIED REGISTERED

## 2021-11-03 PROCEDURE — 77030007880 HC KT SPN EPDRL BBMI -B: Performed by: NURSE ANESTHETIST, CERTIFIED REGISTERED

## 2021-11-03 PROCEDURE — 74011250637 HC RX REV CODE- 250/637: Performed by: ANESTHESIOLOGY

## 2021-11-03 PROCEDURE — 97535 SELF CARE MNGMENT TRAINING: CPT

## 2021-11-03 PROCEDURE — 74011250636 HC RX REV CODE- 250/636: Performed by: ORTHOPAEDIC SURGERY

## 2021-11-03 PROCEDURE — 77030006835 HC BLD SAW SAG STRY -B: Performed by: ORTHOPAEDIC SURGERY

## 2021-11-03 PROCEDURE — 77030019557 HC ELECTRD VES SEAL MEDT -F: Performed by: ORTHOPAEDIC SURGERY

## 2021-11-03 PROCEDURE — 87205 SMEAR GRAM STAIN: CPT

## 2021-11-03 PROCEDURE — 2709999900 HC NON-CHARGEABLE SUPPLY

## 2021-11-03 PROCEDURE — 74011000250 HC RX REV CODE- 250: Performed by: ORTHOPAEDIC SURGERY

## 2021-11-03 PROCEDURE — 74011250637 HC RX REV CODE- 250/637: Performed by: PHYSICIAN ASSISTANT

## 2021-11-03 PROCEDURE — 74011250636 HC RX REV CODE- 250/636: Performed by: NURSE ANESTHETIST, CERTIFIED REGISTERED

## 2021-11-03 PROCEDURE — 77030038692 HC WND DEB SYS IRMX -B: Performed by: ORTHOPAEDIC SURGERY

## 2021-11-03 PROCEDURE — 74011000258 HC RX REV CODE- 258: Performed by: ORTHOPAEDIC SURGERY

## 2021-11-03 PROCEDURE — 76010000173 HC OR TIME 3 TO 3.5 HR INTENSV-TIER 1: Performed by: ORTHOPAEDIC SURGERY

## 2021-11-03 PROCEDURE — C1713 ANCHOR/SCREW BN/BN,TIS/BN: HCPCS | Performed by: ORTHOPAEDIC SURGERY

## 2021-11-03 PROCEDURE — 87075 CULTR BACTERIA EXCEPT BLOOD: CPT

## 2021-11-03 PROCEDURE — 72170 X-RAY EXAM OF PELVIS: CPT

## 2021-11-03 PROCEDURE — 77030031139 HC SUT VCRL2 J&J -A: Performed by: ORTHOPAEDIC SURGERY

## 2021-11-03 PROCEDURE — 76210000006 HC OR PH I REC 0.5 TO 1 HR: Performed by: ORTHOPAEDIC SURGERY

## 2021-11-03 PROCEDURE — 36415 COLL VENOUS BLD VENIPUNCTURE: CPT

## 2021-11-03 PROCEDURE — 77030020269 HC MISC IMPL: Performed by: ORTHOPAEDIC SURGERY

## 2021-11-03 PROCEDURE — 77030013921: Performed by: ORTHOPAEDIC SURGERY

## 2021-11-03 PROCEDURE — 0SR90JA REPLACEMENT OF RIGHT HIP JOINT WITH SYNTHETIC SUBSTITUTE, UNCEMENTED, OPEN APPROACH: ICD-10-PCS | Performed by: ORTHOPAEDIC SURGERY

## 2021-11-03 PROCEDURE — 2709999900 HC NON-CHARGEABLE SUPPLY: Performed by: ORTHOPAEDIC SURGERY

## 2021-11-03 PROCEDURE — 77030040922 HC BLNKT HYPOTHRM STRY -A: Performed by: NURSE ANESTHETIST, CERTIFIED REGISTERED

## 2021-11-03 PROCEDURE — 97110 THERAPEUTIC EXERCISES: CPT

## 2021-11-03 PROCEDURE — 97161 PT EVAL LOW COMPLEX 20 MIN: CPT

## 2021-11-03 PROCEDURE — 97530 THERAPEUTIC ACTIVITIES: CPT

## 2021-11-03 PROCEDURE — 27132 TOTAL HIP ARTHROPLASTY: CPT | Performed by: ORTHOPAEDIC SURGERY

## 2021-11-03 PROCEDURE — 77030018547 HC SUT ETHBND1 J&J -B: Performed by: ORTHOPAEDIC SURGERY

## 2021-11-03 PROCEDURE — 77030018673: Performed by: ORTHOPAEDIC SURGERY

## 2021-11-03 PROCEDURE — 94762 N-INVAS EAR/PLS OXIMTRY CONT: CPT

## 2021-11-03 DEVICE — PINNACLE HIP SOLUTIONS DUAL MOBILITY LINER PINNACLE ACETABULAR SHELL BI-MENTUM PE LINER 50/43 50MM 43/22.2
Type: IMPLANTABLE DEVICE | Site: HIP | Status: FUNCTIONAL
Brand: PINNACLE HIP SOLUTIONS

## 2021-11-03 DEVICE — SCREW BNE L30MM DIA6.5MM CANC HIP S STL GRIPTION FULL THRD: Type: IMPLANTABLE DEVICE | Site: HIP | Status: FUNCTIONAL

## 2021-11-03 DEVICE — BI MENTUM PFR PE LINER 43MM22MM: Type: IMPLANTABLE DEVICE | Site: HIP | Status: FUNCTIONAL

## 2021-11-03 DEVICE — HEAD FEM DIA22.225MM +4MM OFFSET 12/14 TAPR HIP MTL ARTC EZ: Type: IMPLANTABLE DEVICE | Site: HIP | Status: FUNCTIONAL

## 2021-11-03 DEVICE — IMPLANTABLE DEVICE: Type: IMPLANTABLE DEVICE | Site: HIP | Status: FUNCTIONAL

## 2021-11-03 DEVICE — HIP H3 TOT ADV DUAL MOB IMPL CAPPED H3: Type: IMPLANTABLE DEVICE | Status: FUNCTIONAL

## 2021-11-03 DEVICE — PINNACLE GRIPTION ACETABULAR SHELL SECTOR 50MM OD
Type: IMPLANTABLE DEVICE | Site: HIP | Status: FUNCTIONAL
Brand: PINNACLE GRIPTION

## 2021-11-03 RX ORDER — MIDAZOLAM HYDROCHLORIDE 1 MG/ML
2 INJECTION, SOLUTION INTRAMUSCULAR; INTRAVENOUS
Status: DISCONTINUED | OUTPATIENT
Start: 2021-11-03 | End: 2021-11-03 | Stop reason: HOSPADM

## 2021-11-03 RX ORDER — OXYCODONE HYDROCHLORIDE 5 MG/1
5 TABLET ORAL
Status: DISCONTINUED | OUTPATIENT
Start: 2021-11-03 | End: 2021-11-03 | Stop reason: HOSPADM

## 2021-11-03 RX ORDER — ACETAMINOPHEN 650 MG/1
650 SUPPOSITORY RECTAL ONCE
Status: DISCONTINUED | OUTPATIENT
Start: 2021-11-03 | End: 2021-11-03 | Stop reason: ALTCHOICE

## 2021-11-03 RX ORDER — NALOXONE HYDROCHLORIDE 0.4 MG/ML
0.1 INJECTION, SOLUTION INTRAMUSCULAR; INTRAVENOUS; SUBCUTANEOUS AS NEEDED
Status: DISCONTINUED | OUTPATIENT
Start: 2021-11-03 | End: 2021-11-03 | Stop reason: HOSPADM

## 2021-11-03 RX ORDER — LIDOCAINE HYDROCHLORIDE 10 MG/ML
0.1 INJECTION INFILTRATION; PERINEURAL AS NEEDED
Status: DISCONTINUED | OUTPATIENT
Start: 2021-11-03 | End: 2021-11-03 | Stop reason: HOSPADM

## 2021-11-03 RX ORDER — CELECOXIB 200 MG/1
200 CAPSULE ORAL ONCE
Status: COMPLETED | OUTPATIENT
Start: 2021-11-03 | End: 2021-11-03

## 2021-11-03 RX ORDER — NALOXONE HYDROCHLORIDE 0.4 MG/ML
.2-.4 INJECTION, SOLUTION INTRAMUSCULAR; INTRAVENOUS; SUBCUTANEOUS
Status: DISCONTINUED | OUTPATIENT
Start: 2021-11-03 | End: 2021-11-05 | Stop reason: HOSPADM

## 2021-11-03 RX ORDER — ASPIRIN 81 MG/1
81 TABLET ORAL EVERY 12 HOURS
Status: DISCONTINUED | OUTPATIENT
Start: 2021-11-03 | End: 2021-11-05 | Stop reason: HOSPADM

## 2021-11-03 RX ORDER — ROPIVACAINE HYDROCHLORIDE 2 MG/ML
INJECTION, SOLUTION EPIDURAL; INFILTRATION; PERINEURAL AS NEEDED
Status: DISCONTINUED | OUTPATIENT
Start: 2021-11-03 | End: 2021-11-03 | Stop reason: HOSPADM

## 2021-11-03 RX ORDER — CELECOXIB 200 MG/1
200 CAPSULE ORAL EVERY 12 HOURS
Status: DISCONTINUED | OUTPATIENT
Start: 2021-11-03 | End: 2021-11-05 | Stop reason: HOSPADM

## 2021-11-03 RX ORDER — CEFAZOLIN SODIUM/WATER 2 G/20 ML
2 SYRINGE (ML) INTRAVENOUS ONCE
Status: COMPLETED | OUTPATIENT
Start: 2021-11-03 | End: 2021-11-03

## 2021-11-03 RX ORDER — BUPIVACAINE HYDROCHLORIDE 7.5 MG/ML
INJECTION, SOLUTION INTRASPINAL
Status: COMPLETED | OUTPATIENT
Start: 2021-11-03 | End: 2021-11-03

## 2021-11-03 RX ORDER — ACETAMINOPHEN 500 MG
1000 TABLET ORAL ONCE
Status: COMPLETED | OUTPATIENT
Start: 2021-11-03 | End: 2021-11-03

## 2021-11-03 RX ORDER — HYDROMORPHONE HYDROCHLORIDE 1 MG/ML
1 INJECTION, SOLUTION INTRAMUSCULAR; INTRAVENOUS; SUBCUTANEOUS
Status: DISCONTINUED | OUTPATIENT
Start: 2021-11-03 | End: 2021-11-05 | Stop reason: HOSPADM

## 2021-11-03 RX ORDER — OXYCODONE HYDROCHLORIDE 5 MG/1
10 TABLET ORAL
Status: DISCONTINUED | OUTPATIENT
Start: 2021-11-03 | End: 2021-11-03 | Stop reason: HOSPADM

## 2021-11-03 RX ORDER — SODIUM CHLORIDE, SODIUM LACTATE, POTASSIUM CHLORIDE, CALCIUM CHLORIDE 600; 310; 30; 20 MG/100ML; MG/100ML; MG/100ML; MG/100ML
100 INJECTION, SOLUTION INTRAVENOUS CONTINUOUS
Status: DISCONTINUED | OUTPATIENT
Start: 2021-11-03 | End: 2021-11-03 | Stop reason: HOSPADM

## 2021-11-03 RX ORDER — PROPOFOL 10 MG/ML
INJECTION, EMULSION INTRAVENOUS
Status: DISCONTINUED | OUTPATIENT
Start: 2021-11-03 | End: 2021-11-03 | Stop reason: HOSPADM

## 2021-11-03 RX ORDER — DIPHENHYDRAMINE HYDROCHLORIDE 50 MG/ML
12.5 INJECTION, SOLUTION INTRAMUSCULAR; INTRAVENOUS
Status: DISCONTINUED | OUTPATIENT
Start: 2021-11-03 | End: 2021-11-03 | Stop reason: HOSPADM

## 2021-11-03 RX ORDER — HYDROMORPHONE HYDROCHLORIDE 2 MG/ML
0.5 INJECTION, SOLUTION INTRAMUSCULAR; INTRAVENOUS; SUBCUTANEOUS
Status: DISCONTINUED | OUTPATIENT
Start: 2021-11-03 | End: 2021-11-03 | Stop reason: HOSPADM

## 2021-11-03 RX ORDER — FENTANYL CITRATE 50 UG/ML
100 INJECTION, SOLUTION INTRAMUSCULAR; INTRAVENOUS ONCE
Status: DISCONTINUED | OUTPATIENT
Start: 2021-11-03 | End: 2021-11-03 | Stop reason: HOSPADM

## 2021-11-03 RX ORDER — ROSUVASTATIN CALCIUM 5 MG/1
5 TABLET, COATED ORAL EVERY OTHER DAY
Status: DISCONTINUED | OUTPATIENT
Start: 2021-11-05 | End: 2021-11-05 | Stop reason: HOSPADM

## 2021-11-03 RX ORDER — SODIUM CHLORIDE 0.9 % (FLUSH) 0.9 %
5-40 SYRINGE (ML) INJECTION AS NEEDED
Status: CANCELLED | OUTPATIENT
Start: 2021-11-03

## 2021-11-03 RX ORDER — TRANEXAMIC ACID 100 MG/ML
INJECTION, SOLUTION INTRAVENOUS AS NEEDED
Status: DISCONTINUED | OUTPATIENT
Start: 2021-11-03 | End: 2021-11-03 | Stop reason: HOSPADM

## 2021-11-03 RX ORDER — ACETAMINOPHEN 325 MG/1
975 TABLET ORAL ONCE
Status: DISCONTINUED | OUTPATIENT
Start: 2021-11-03 | End: 2021-11-03 | Stop reason: ALTCHOICE

## 2021-11-03 RX ORDER — ALBUTEROL SULFATE 0.83 MG/ML
2.5 SOLUTION RESPIRATORY (INHALATION) AS NEEDED
Status: DISCONTINUED | OUTPATIENT
Start: 2021-11-03 | End: 2021-11-03 | Stop reason: HOSPADM

## 2021-11-03 RX ORDER — OXYCODONE HYDROCHLORIDE 5 MG/1
10 TABLET ORAL
Status: DISCONTINUED | OUTPATIENT
Start: 2021-11-03 | End: 2021-11-05 | Stop reason: HOSPADM

## 2021-11-03 RX ORDER — LEVOTHYROXINE SODIUM 88 UG/1
88 TABLET ORAL
Status: DISCONTINUED | OUTPATIENT
Start: 2021-11-04 | End: 2021-11-05 | Stop reason: HOSPADM

## 2021-11-03 RX ORDER — MIDAZOLAM HYDROCHLORIDE 1 MG/ML
2 INJECTION, SOLUTION INTRAMUSCULAR; INTRAVENOUS ONCE
Status: COMPLETED | OUTPATIENT
Start: 2021-11-03 | End: 2021-11-03

## 2021-11-03 RX ORDER — DIPHENHYDRAMINE HCL 25 MG
25 CAPSULE ORAL
Status: DISCONTINUED | OUTPATIENT
Start: 2021-11-03 | End: 2021-11-05 | Stop reason: HOSPADM

## 2021-11-03 RX ORDER — EPHEDRINE SULFATE/0.9% NACL/PF 50 MG/5 ML
SYRINGE (ML) INTRAVENOUS AS NEEDED
Status: DISCONTINUED | OUTPATIENT
Start: 2021-11-03 | End: 2021-11-03 | Stop reason: HOSPADM

## 2021-11-03 RX ORDER — DEXAMETHASONE SODIUM PHOSPHATE 100 MG/10ML
10 INJECTION INTRAMUSCULAR; INTRAVENOUS ONCE
Status: COMPLETED | OUTPATIENT
Start: 2021-11-04 | End: 2021-11-04

## 2021-11-03 RX ORDER — ONDANSETRON 4 MG/1
4 TABLET, ORALLY DISINTEGRATING ORAL
Status: DISCONTINUED | OUTPATIENT
Start: 2021-11-03 | End: 2021-11-05 | Stop reason: HOSPADM

## 2021-11-03 RX ORDER — AMOXICILLIN 250 MG
2 CAPSULE ORAL DAILY
Status: DISCONTINUED | OUTPATIENT
Start: 2021-11-04 | End: 2021-11-05 | Stop reason: HOSPADM

## 2021-11-03 RX ORDER — SODIUM CHLORIDE 9 MG/ML
100 INJECTION, SOLUTION INTRAVENOUS CONTINUOUS
Status: DISPENSED | OUTPATIENT
Start: 2021-11-03 | End: 2021-11-05

## 2021-11-03 RX ORDER — ONDANSETRON 2 MG/ML
INJECTION INTRAMUSCULAR; INTRAVENOUS AS NEEDED
Status: DISCONTINUED | OUTPATIENT
Start: 2021-11-03 | End: 2021-11-03 | Stop reason: HOSPADM

## 2021-11-03 RX ORDER — ACETAMINOPHEN 500 MG
1000 TABLET ORAL EVERY 6 HOURS
Status: DISCONTINUED | OUTPATIENT
Start: 2021-11-03 | End: 2021-11-05 | Stop reason: HOSPADM

## 2021-11-03 RX ORDER — CEFAZOLIN SODIUM/WATER 2 G/20 ML
2 SYRINGE (ML) INTRAVENOUS EVERY 8 HOURS
Status: COMPLETED | OUTPATIENT
Start: 2021-11-03 | End: 2021-11-04

## 2021-11-03 RX ORDER — LEVOTHYROXINE SODIUM 100 UG/1
100 TABLET ORAL
Status: DISCONTINUED | OUTPATIENT
Start: 2021-11-07 | End: 2021-11-05 | Stop reason: HOSPADM

## 2021-11-03 RX ORDER — DEXAMETHASONE SODIUM PHOSPHATE 4 MG/ML
INJECTION, SOLUTION INTRA-ARTICULAR; INTRALESIONAL; INTRAMUSCULAR; INTRAVENOUS; SOFT TISSUE AS NEEDED
Status: DISCONTINUED | OUTPATIENT
Start: 2021-11-03 | End: 2021-11-03 | Stop reason: HOSPADM

## 2021-11-03 RX ORDER — KETOROLAC TROMETHAMINE 30 MG/ML
INJECTION, SOLUTION INTRAMUSCULAR; INTRAVENOUS AS NEEDED
Status: DISCONTINUED | OUTPATIENT
Start: 2021-11-03 | End: 2021-11-03 | Stop reason: HOSPADM

## 2021-11-03 RX ORDER — ONDANSETRON 2 MG/ML
4 INJECTION INTRAMUSCULAR; INTRAVENOUS ONCE
Status: DISCONTINUED | OUTPATIENT
Start: 2021-11-03 | End: 2021-11-03 | Stop reason: HOSPADM

## 2021-11-03 RX ORDER — SODIUM CHLORIDE 0.9 % (FLUSH) 0.9 %
5-40 SYRINGE (ML) INJECTION EVERY 8 HOURS
Status: CANCELLED | OUTPATIENT
Start: 2021-11-03

## 2021-11-03 RX ADMIN — BUPIVACAINE HYDROCHLORIDE IN DEXTROSE 12.5 MG: 7.5 INJECTION, SOLUTION SUBARACHNOID at 10:49

## 2021-11-03 RX ADMIN — ONDANSETRON 4 MG: 2 INJECTION INTRAMUSCULAR; INTRAVENOUS at 11:02

## 2021-11-03 RX ADMIN — MIDAZOLAM 2 MG: 1 INJECTION INTRAMUSCULAR; INTRAVENOUS at 10:31

## 2021-11-03 RX ADMIN — Medication 10 MG: at 11:19

## 2021-11-03 RX ADMIN — Medication 10 MG: at 12:10

## 2021-11-03 RX ADMIN — ASPIRIN 81 MG: 81 TABLET, COATED ORAL at 21:05

## 2021-11-03 RX ADMIN — ACETAMINOPHEN 1000 MG: 500 TABLET, FILM COATED ORAL at 15:36

## 2021-11-03 RX ADMIN — ACETAMINOPHEN 1000 MG: 500 TABLET, FILM COATED ORAL at 09:59

## 2021-11-03 RX ADMIN — Medication 10 MG: at 11:07

## 2021-11-03 RX ADMIN — CEFAZOLIN 2 G: 1 INJECTION, POWDER, FOR SOLUTION INTRAVENOUS at 10:43

## 2021-11-03 RX ADMIN — Medication 10 MG: at 11:30

## 2021-11-03 RX ADMIN — LIDOCAINE HYDROCHLORIDE 0.1 ML: 10 INJECTION, SOLUTION INFILTRATION; PERINEURAL at 10:12

## 2021-11-03 RX ADMIN — SODIUM CHLORIDE, SODIUM LACTATE, POTASSIUM CHLORIDE, AND CALCIUM CHLORIDE: 600; 310; 30; 20 INJECTION, SOLUTION INTRAVENOUS at 11:40

## 2021-11-03 RX ADMIN — Medication 1 AMPULE: at 21:05

## 2021-11-03 RX ADMIN — Medication 3 AMPULE: at 09:00

## 2021-11-03 RX ADMIN — OXYCODONE 5 MG: 5 TABLET ORAL at 15:36

## 2021-11-03 RX ADMIN — OXYCODONE 5 MG: 5 TABLET ORAL at 17:20

## 2021-11-03 RX ADMIN — SODIUM CHLORIDE, SODIUM LACTATE, POTASSIUM CHLORIDE, AND CALCIUM CHLORIDE 100 ML/HR: 600; 310; 30; 20 INJECTION, SOLUTION INTRAVENOUS at 10:12

## 2021-11-03 RX ADMIN — Medication 10 MG: at 11:09

## 2021-11-03 RX ADMIN — CELECOXIB 200 MG: 200 CAPSULE ORAL at 21:05

## 2021-11-03 RX ADMIN — CEFAZOLIN SODIUM 2 G: 100 INJECTION, POWDER, LYOPHILIZED, FOR SOLUTION INTRAVENOUS at 17:20

## 2021-11-03 RX ADMIN — CELECOXIB 200 MG: 200 CAPSULE ORAL at 09:59

## 2021-11-03 RX ADMIN — ACETAMINOPHEN 1000 MG: 500 TABLET, FILM COATED ORAL at 23:58

## 2021-11-03 RX ADMIN — TRANEXAMIC ACID 1 G: 100 INJECTION, SOLUTION INTRAVENOUS at 10:47

## 2021-11-03 RX ADMIN — DEXAMETHASONE SODIUM PHOSPHATE 10 MG: 4 INJECTION, SOLUTION INTRAMUSCULAR; INTRAVENOUS at 11:02

## 2021-11-03 RX ADMIN — OXYCODONE 10 MG: 5 TABLET ORAL at 21:05

## 2021-11-03 RX ADMIN — PROPOFOL 100 MCG/KG/MIN: 10 INJECTION, EMULSION INTRAVENOUS at 10:54

## 2021-11-03 RX ADMIN — Medication 10 MG: at 11:41

## 2021-11-03 NOTE — PROGRESS NOTES
11/03/21 1818   Oxygen Therapy   O2 Sat (%) 100 %   Pulse via Oximetry 81 beats per minute   O2 Device None (Room air)   O2 Flow Rate (L/min) 0 l/min   Patient encouraged to do 10 breaths every hour while awake-patient agreed and demonstrated. No shortness of breath or distress noted. BS are clear b/l. Joint Camp notes reviewed- Sat monitor order noted HS.

## 2021-11-03 NOTE — CONSULTS
[facility] Hospitalist Consult   Admit Date:  11/3/2021  8:36 AM   Name:  Kinsey Calix   Age:  79 y.o. Sex:  female  :  1950   MRN:  [id number]   Room:  311/    Presenting Complaint: No chief complaint on file. Reason(s) for Admission: Primary osteoarthritis of right hip [M16.11]  Presence of right artificial hip joint [Z96.641]  AVN of femur [facility]) [M87.059]     Hospitalists consulted by Bruna Davalos MD for: medical mgmt    History of Presenting Illness:   Kinsey Calix is a 79 y.o. female with history of dyslipidemia, hypothyroidism, remote right femoral neck fx and RUE fx sustained while playing pickle ball 2020 who developed worsening pain to her right gluteal are, right anterior thigh into right knee which limited her daily activities. Pt was tried on outpatient conservative tx with anti-inflammatories, oral steroids and activity modifications but her symptoms worsened. Pt was seen and followed by Dr. Marlen lee outpatient and ultimately deemed that pt failed conservative tx for her right femoral head AVN warranting surgical intervention to convert right hip pinning to a total right hip arthroplasty. Currently pt is recovering from surgery and has actually ambulated the halls with PT with walker. She admits to some right hip discomfort but overall feels well; denies chest pain, SOB, dizziness. Pt's  reports that during her last surgery back in 2020 pt has had orthostatic hypotension and syncopal episodes and wanted staff to be aware of this potential occurrence. Review of Systems:  10 systems reviewed and negative except as noted in HPI.     Assessment & Plan:   Principal Problem:    Status post right hip replacement (11/3/2021)  - s/p surgery today  - pain controlled; mgmt per primary team  - PT/OT working with pt  - f/u AM hgb  - pt very proactive about getting up and ambulating  - caution with known hx of orthostatic hypotension post-operatively; check orthostatic VS    Active Problems:    AVN of femur ([address] (11/3/2021)  - s/p RTHA today      Hypothyroidism  - synthroid resumed      Dyslipidemia  - statin tx resumed      Hx of orthostatic hypotension (post operatively)  - fall precautions  - check orthostatic vs      Thank you for this consult, we will follow this lucy patient with you. Hospital Problems as of 11/3/2021 Date Reviewed: 2021          Codes Class Noted - Resolved POA    * (Principal) Status post right hip replacement ICD-10-CM: Z96.641  ICD-9-CM: V43.64  11/3/2021 - Present         AVN of femur ([address] ICD-10-CM: M87.059  ICD-9-CM: 733.42  11/3/2021 - Present Unknown              Past History:  Past Medical History:   Diagnosis Date    Arthritis     Fractured hip ([address] 2020    right    Fractured shoulder 2020    right    Hyperlipidemia     Hypothyroidism     Sleep apnea     C-pap nightly       Past Surgical History:   Procedure Laterality Date    HX ORTHOPAEDIC  2020    Rt hip fracture repair    HX REFRACTIVE SURGERY  2000    HX SEPTOPLASTY        Allergies   Allergen Reactions    Lisinopril Other (comments)     Dizzy     Estradiol Other (comments)     Didn't work       Social History     Tobacco Use    Smoking status: Former Smoker     Packs/day: 0.25     Years: 4.00     Pack years: 1.00     Quit date: 7/10/1978     Years since quittin.3    Smokeless tobacco: Never Used   Substance Use Topics    Alcohol use: Yes     Comment: social       No family history on file. Family history reviewed and negative except as otherwise noted. There is no immunization history for the selected administration types on file for this patient.   Current Facility-Administered Medications   Medication Dose Route Frequency    [START ON 2021] levothyroxine (SYNTHROID) tablet 100 mcg  100 mcg Oral Once per day on Sun Wed    [START ON 2021] rosuvastatin (CRESTOR) tablet 5 mg  5 mg Oral EVERY OTHER DAY    alcohol 62% (NOZIN) nasal  1 Ampule  1 Ampule Topical Q12H    0.9% sodium chloride infusion  100 mL/hr IntraVENous CONTINUOUS    ceFAZolin (ANCEF) 2 g/20 mL in sterile water IV syringe  2 g IntraVENous Q8H    acetaminophen (TYLENOL) tablet 1,000 mg  1,000 mg Oral Q6H    celecoxib (CELEBREX) capsule 200 mg  200 mg Oral Q12H    oxyCODONE IR (ROXICODONE) tablet 10 mg  10 mg Oral Q4H PRN    HYDROmorphone (DILAUDID) injection 1 mg  1 mg IntraVENous Q3H PRN    naloxone (NARCAN) injection 0.2-0.4 mg  0.2-0.4 mg IntraVENous Q10MIN PRN    [START ON 11/4/2021] dexamethasone (DECADRON) 10 mg/mL injection 10 mg  10 mg IntraVENous ONCE    ondansetron (ZOFRAN ODT) tablet 4 mg  4 mg Oral Q4H PRN    diphenhydrAMINE (BENADRYL) capsule 25 mg  25 mg Oral Q4H PRN    [START ON 11/4/2021] senna-docusate (PERICOLACE) 8.6-50 mg per tablet 2 Tablet  2 Tablet Oral DAILY    aspirin delayed-release tablet 81 mg  81 mg Oral Q12H    [START ON 11/4/2021] levothyroxine (SYNTHROID) tablet 88 mcg  88 mcg Oral Once per day on Mon Tue Thu Fri Sat       Objective:     Patient Vitals for the past 24 hrs:   Temp Pulse Resp BP SpO2   11/03/21 1448  99 16 137/73 99 %   11/03/21 1435  78 16 131/66 100 %   11/03/21 1430  83 16 117/62 100 %   11/03/21 1425  68 16 123/65 98 %   11/03/21 1420  71 16 (!) 107/57 100 %   11/03/21 1416  75 16 (!) 108/57 100 %   11/03/21 1410  80 16 (!) 104/58 100 %   11/03/21 1402 97.3 °F (36.3 °C) 85 20 (!) 104/58 100 %   11/03/21 0913 98.2 °F (36.8 °C) 75 18 (!) 168/86 99 %     Oxygen Therapy  O2 Sat (%): 99 % (11/03/21 1448)  Pulse via Oximetry: 73 beats per minute (11/03/21 0913)  O2 Device: None (Room air) (11/03/21 1435)  O2 Flow Rate (L/min): 3 l/min (11/03/21 1420)    Estimated body mass index is 17.98 kg/m² as calculated from the following:    Height as of 10/12/21: 5' 5.5\" (1.664 m). Weight as of this encounter: 49.8 kg (109 lb 11.2 oz).     Intake/Output Summary (Last 24 hours) at 11/3/2021 6902 [facility] ([place], [place]) filed at 11/3/2021 1402  Gross per 24 hour   Intake 1750 ml   Output 300 ml   Net 1450 ml         Physical Exam:    Blood pressure 137/73, pulse 99, temperature 97.3 °F (36.3 °C), resp. rate 16, weight 49.8 kg (109 lb 11.2 oz), SpO2 99 %. General:    Well nourished. No overt distress  Head:  Normocephalic, atraumatic  Eyes:  Sclerae appear normal.  Pupils equally round. ENT:  Nares appear normal, no drainage. Moist oral mucosa  Neck:  No restricted ROM. Trachea midline   CV:   RRR. No m/r/g. No jugular venous distension. Lungs:   CTAB. No wheezing, rhonchi, or rales. Respirations even, unlabored  Abdomen: Bowel sounds present. Soft, nontender, nondistended. Extremities: Right hip bandage clean; moves all 4 extremities on command; sensation intact x 4  Skin:     No rashes and normal coloration. Warm and dry. Neuro:  CN II-XII grossly intact. Sensation intact. A&Ox3  Psych:  Normal mood and affect. I have reviewed ordered lab tests and independently visualized imaging below:    Recent Labs:  No results found for this or any previous visit (from the past 48 hour(s)). All Micro Results     Procedure Component Value Units Date/Time    CULTURE, Bianca Min STAIN [[id number]] Collected: 11/03/21 1210    Order Status: Completed Specimen: Wound from Surgical Specimen Updated: 11/03/21 1513    CULTURE, ANAEROBIC [[id number]] Collected: 11/03/21 1210    Order Status: Completed Specimen: Surgical Specimen Updated: 11/03/21 1512    CULTURE, Bianca Min STAIN [[id number]] Collected: 11/03/21 1200    Order Status: Completed Specimen: Wound from Surgical Specimen Updated: 11/03/21 1511    CULTURE, ANAEROBIC [[id number]] Collected: 11/03/21 1200    Order Status: Completed Specimen: Surgical Specimen Updated: 11/03/21 1510          Other Studies:  XR PELV AP ONLY    Result Date: 11/3/2021  EXAM:  XR PELV AP ONLY INDICATION:   Post op total hip COMPARISON: 9/10/2021.  FINDINGS: An AP view of the pelvis demonstrates no fracture, lytic or blastic lesion or other abnormality. There is a right total hip replacement that has replaced the femoral neck system seen on the prior study. The complements are anatomically aligned. Is no evidence of fractures. A single cerclage wire is present. The left hip is unremarkable. .     Unremarkable right total hip replacement. Signed:  KELIN Valente    Part of this note may have been written by using a voice dictation software. The note has been proof read but may still contain some grammatical/other typographical errors.

## 2021-11-03 NOTE — PROGRESS NOTES
Problem: Mobility Impaired (Adult and Pediatric)  Goal: *Acute Goals and Plan of Care (Insert Text)  Outcome: Progressing Towards Goal  Note: GOALS (1-4 days):  (1.)Ms. Reyna Jean will move from supine to sit and sit to supine  in bed with INDEPENDENT. (2.)Ms. Reyna Jean will transfer from bed to chair and chair to bed with INDEPENDENT using the least restrictive device. (3.)Ms. Reyna Jean will ambulate with INDEPENDENT for 300 feet with the least restrictive device. (4.)Ms. Reyna Jean will ambulate up/down 3 steps with bilateral  railing with MINIMAL ASSIST with no device. (5.)Ms. Reyna Jean will state/observe PHUC precautions with 0 verbal cues. ________________________________________________________________________________________________      PHYSICAL THERAPY JOINT CAMP PHUC: Initial Assessment, Daily Note, Treatment Day: Day of Assessment and 1st and PM 11/3/2021  INPATIENT: Hospital Day: 1  [place] / [place] / Product Type: Medicare /      NAME/AGE/GENDER: Kinsey Calix is a 79 y.o. female   PRIMARY DIAGNOSIS:  Primary osteoarthritis of right hip [M16.11]   Procedure(s) and Anesthesia Type:     * RIGHT CONVERSION  HIP PINNING TO PHUC / Evonnie Euler - Spinal (Right)  ICD-10: Treatment Diagnosis:    Pain in Right Hip (M25.551)  Stiffness of Right Hip, Not elsewhere classified (M25.651)  Difficulty in walking, Not elsewhere classified (R26.2)  Other abnormalities of gait and mobility (R26.89)      ASSESSMENT:     Ms. Reyna Jean presents with decreased strength and range of motion right lower extremity and with decreased independence with functional mobility s/p  right total hip arthroplasty. Pt will benefit from skilled PT interventions to maximize independence with functional mobility and PHUC management. Pt did well with assessment and activity. Pt performed supine to sit to stand. Pt ambulated in hallway. Pt performed exercise in bedside chair. Pt in bedside chair with needs in reach.  Pt instructed not to get up without assist. Darlene Parsons to progress mobility and PHUC exercises at next session. Pt plans to discharge to home from the hospital with continued therapy for follow up. This section established at most recent assessment   PROBLEM LIST (Impairments causing functional limitations):  Decreased Strength  Decreased ADL/Functional Activities  Decreased Transfer Abilities  Decreased Ambulation Ability/Technique  Increased Pain  Decreased Flexibility/Joint Mobility  Decreased Cook with Home Exercise Program   INTERVENTIONS PLANNED: (Benefits and precautions of physical therapy have been discussed with the patient.)  Bed Mobility  Cold  Gait Training  Home Exercise Program (HEP)  Range of Motion (ROM)  Therapeutic Activites  Therapeutic Exercise/Strengthening  Transfer Training     TREATMENT PLAN: Frequency/Duration: Follow patient BID for duration of hospital stay to address above goals. Rehabilitation Potential For Stated Goals: Good     RECOMMENDED REHABILITATION/EQUIPMENT: (at time of discharge pending progress): Continue Skilled Therapy and Home Health: Physical Therapy. HISTORY:   History of Present Injury/Illness (Reason for Referral):  Pt s/p PHUC on 11/3  Past Medical History/Comorbidities:   Ms. Ulysses Meadows  has a past medical history of Arthritis, Fractured hip [address] [address] (11/2020), Fractured shoulder (11/2020), Hyperlipidemia, Hypothyroidism, and Sleep apnea. Ms. Ulysses Meadows  has a past surgical history that includes hx septoplasty (1991); hx refractive surgery (04/2000); and hx orthopaedic (11/2020).    Social History/Living Environment:   Home Environment: Private residence  # Steps to Enter: 0  One/Two Story Residence: Two story (basement)  # of Interior Steps: [address]: Left  Lift Chair Available: No  Living Alone: No  Support Systems: Spouse/Significant Other  Patient Expects to be Discharged toVF Cor[de-identified]ration  Current DME Used/Available at Home: laureen Galloway; Juan José Hanna straight; Crutches; Shower chair; Commode, bedside  Tub or Shower Type: Shower    Prior Level of Function/Work/Activity:  Pt independent with ambulation. Number of Personal Factors/Comorbidities that affect the Plan of Care: 0: LOW COMPLEXITY   EXAMINATION:   Most Recent Physical Functioning:   Gross Assessment: Yes  Gross Assessment  AROM: Generally decreased, functional  Strength: Generally decreased, functional  Sensation: Intact                     Bed Mobility  Supine to Sit: Minimum assistance  Scooting: Minimum assistance    Transfers  Sit to Stand: Minimum assistance  Stand to Sit: Minimum assistance  Bed to Chair: Minimum assistance    Balance  Sitting: Intact  Standing: Pull to stand; With support    Posture  Posture (WDL): Within defined limits            Distance (ft): 225 Feet (ft)  Ambulation - Level of Assistance: Minimal assistance  Assistive Device: Walker, rolling  Gait Abnormalities: Antalgic        Braces/Orthotics: none           Body Structures Involved:  Joints  Muscles Body Functions Affected: Movement Related Activities and Participation Affected: Mobility  Self Care   Number of elements that affect the Plan of Care: 4+: HIGH COMPLEXITY   CLINICAL PRESENTATION:   Presentation: Stable and uncomplicated: LOW COMPLEXITY   CLINICAL DECISION MAKIN[address] [place] [address] AM-PAC 6 Clicks   Basic Mobility Inpatient Short Form  How much difficulty does the patient currently have. .. Unable A Lot A Little None   1. Turning over in bed (including adjusting bedclothes, sheets and blankets)? [] 1   [] 2   [x] 3   [] 4   2. Sitting down on and standing up from a chair with arms ( e.g., wheelchair, bedside commode, etc.)   [] 1   [] 2   [x] 3   [] 4   3. Moving from lying on back to sitting on the side of the bed? [] 1   [] 2   [x] 3   [] 4   How much help from another person does the patient currently need. .. Total A Lot A Little None   4.   Moving to and from a bed to a chair (including a wheelchair)? [] 1   [] 2   [x] 3   [] 4   5. Need to walk in hospital room? [] 1   [] 2   [x] 3   [] 4   6. Climbing 3-5 steps with a railing? [] 1   [] 2   [x] 3   [] 4   © 2007, Trustees of [address] [place] [address] [address] [address], under license to [employer]. All rights reserved     Score:  Initial: 18 Most Recent: X (Date: -- )    Interpretation of Tool:  Represents activities that are increasingly more difficult (i.e. Bed mobility, Transfers, Gait). Medical Necessity:     Patient is expected to demonstrate progress in   strength, range of motion, and functional technique   to   decrease assistance required with functional mobility and PHUC management  . Reason for Services/Other Comments:  Patient continues to require skilled intervention due to   Inability to complete functional mobility and PHUC management independently  . Use of outcome tool(s) and clinical judgement create a POC that gives a: Clear prediction of patient's progress: LOW COMPLEXITY            TREATMENT:   (In addition to Assessment/Re-Assessment sessions the following treatments were rendered)     Pre-treatment Symptoms/Complaints:    Pain Initial:      Post Session:  No complaints     Gait Training (  15 minutes):  Gait training to improve and/or restore physical functioning as related to mobility. Ambulated 225 Feet (ft) with Minimal assistance using a Walker, rolling    Therapeutic Exercise: (  10 minutes):  Exercises per grid below to improve mobility. Assessment   Date:  11/3 Date:   Date:     ACTIVITY/EXERCISE AM PM AM PM AM PM     []  []  []  []  []  []   Ankle Pumps  10       Quad Sets  10       Gluteal Sets  10       Hip ABd/ADduction  10       Knee Slides  10       Short Arc Quads  10       Long Arc Quads                                    B = bilateral; AA = active assistive; A = active; P = passive      Treatment/Session Assessment:     Response to Treatment:  Pt agreeable to exercise and ambulate with therapy.     Education:  [x] Home Exercises  [x] Fall Precautions  [x] Hip Precautions [] D/C Instruction Review  [] Hip Prosthesis Review  [x] Walker Management/Safety [] Adaptive Equipment as Needed       Interdisciplinary Collaboration:   Physical Therapist  Occupational Therapist  Registered Nurse    After treatment position/precautions:   Up in chair  Bed/Chair-wheels locked  Bed in low position  Caregiver at bedside  Call light within reach  RN notified    Compliance with Program/Exercises: Compliant most of the time, Will assess as treatment progresses. Recommendations/Intent for next treatment session:  Treatment next visit will focus on increasing Ms. Kristin Mis independence with bed mobility, transfers, gait training, strength/ROM exercises, modalities for pain, and patient education.       Total Treatment Duration:  PT Patient Time In/Time Out  Time In: 1600  Time Out: 1628    Sheila Rivers, PT

## 2021-11-03 NOTE — OP NOTES
Total Hip Procedure Note    Ayla Bullock,  [id number],  1950    Pre-operative Diagnosis:  AVN right femoral head S/P right femoral neck fracture with retained metal fixation  Post-operative Diagnosis: same    Procedure: Procedure(s) (LRB):  RIGHT CONVERSION  HIP PINNING TO PHUC / Sander Trinh (Right)  CPT code: [address]  Location: [address] [place] [address]  Surgeon: Jerry Jacobo MD  Assistant: Jennett Dubin assist    Anesthesia: Spinal    Indications: Patient has AVN of her right femoral head status post right femoral neck fracture and in situ pinning with AO fixation device. Findings: AVN right femoral head with retained AO fixation device  EBL: 300cc  BMI: Body mass index is 17.98 kg/m². Procedure: The complexity of total joint surgery requires the use of a first assistant for positioning, retraction and expertise in closure. Ayla Bullock was brought to the operating room and positioned on the operating table. Anesthesia was administered. IV antibiotics were administered. A time out identifying the patient, procedure, operative side and surgeon was administered and charted by the OR Nurse. The patient was positioned on the contralateral decubitus position. The limb was prepped and draped in the usual sterile fashion. A posterior approach was utilized to expose the hip. The incision was carried through the subcutaneous tissue and underlying fascia. Incision was extended distally to incorporate her previous incision. This scar was elliptically excised to provide better closure at the end. Hemostasis obtained using the bovie cauterization. A Charnley retractor was inserted. The origin of the vastus lateralis was exposed. The fascia was split longitudinally to expose the AO fixation device. The proximal transverse screw was removed without difficulty the distal transverse screw could not be removed as it was cold welded to the plate. The screw head was stripped trying to remove this distal screw. A metal cutting bur was used to remove the head and cut through the plate. The inferior portion of the plate was removed. The tibial bolt was then removed with a screwdriver. The rest of the fixation device was then removed without difficulty. The tissue was debrided around the vastus lateralis to remove metal shavings. The short external rotators were divided at their insertion. The sciatic nerve was palpated and identified. Next, a L-shaped capsular incision was accomplished and femoral head was dislocated. The neck was osteotomized a measured distance above the lesser trochanter. The neck and head cut was measured with a caliper. .    Acetabular retractors were placed and the appropriate capsulotomy performed. Soft tissue was removed from the acetabulum. The acetabulum was sequentially reamed. Using trial components the acetabular component was sized. The acetabular component was impacted at approximately 40 degrees horizontal tilt and 20 degrees anteversion. One 6.5 mm dome screw was used to fixate the cup. The dual mobility liner was then impacted into place. Utilizing the femoral retractor, the canal was prepared with appropriate lateralization and reamed with the starter reamer. The canal was then broached progressively to accommodate the DePuy stem. The broach was positioned with the anatomic femoral anteversion. Upon insertion of the 12 mm broach a small nondisplaced cracked occurred in the medial calcar. A cerclage wire was then placed around the proximal femur to prevent displacement of the small crack. The wire was tightened and then crimped. A calcar planar was utilized. Trials were preformed using various neck length heads until leg length had been restored. The hip was carried through a range of motion and was found to be stable to flexion greater than 90 degrees and on internal and external rotation.  Limb lengths were thought to be equilibrated and with appropriate stability as mentioned above. All trial components were removed. The cementless permanent stem was impacted into place. A trial reduction was performed and the above neck length was selected. The permanent dual mobility femoral head was impacted into place. The hip was reduced and the stability was as mentioned as above. Copious irrigation was accomplished about the surgical site. The sciatic nerve was palpated and noted to be intact. The capsule was repaired with an absorbable suture and the external rotators were reattached with a #5 Mersilene. The operative hip was injected with 60 cc of Neuropin, 1cc of 10 mg of morphine, and 1 cc of 30 mg of Toradol. The Hip was then soaked with a diluted betadine solution for approximately 3 minutes and then thoroughly irrigated. A #1 PDS suture was used to re-approximate the fascia. Then, 1 gram (100 mg/ml) of Transexamic Acid was injected into the joint space. An insorb stapler with absorbable sutures were used to approximate the subcutaneous layers. Staples were applied in an occlusive fashion and a sterile bandage was placed. The patient was then rolled to a supine position. The sponge, needle and instrument counts were correct. The patient tolerated the procedure without difficulty and left the operating room in satisfactory condition. Implants:   Implant Name Type Inv.  Item Serial No.  Lot No. LRB No. Used Action   PIN FIX TROCAR PT 1 END 7/64X9 IN 1 PT STYL SMOOTH PLN STRL - [id number]  PIN FIX TROCAR PT 1 END 7/64X9 IN 1 PT STYL SMOOTH PLN STRL  [employer] [facility] [id number] Right 1 Implanted and Explanted   CUP ACET [id number] HIP GRIPTION PETE CEMENTLESS FIX SECT SER - [id number]  CUP ACET LOG39IG HIP GRIPTION PETE CEMENTLESS FIX SECT SER  [facility] [id number] Right 1 Implanted   LINER ACET OD50MM ID32MM NEUT OFFSET HIP ALTRX PINN - [id number]  LINER ACET OD50MM ID32MM NEUT OFFSET HIP ALTRX PINN  [facility] [employer] [facility] [id number] Right 1 Implanted and Explanted   SCREW BNE L25MM DIA6.5MM CANC HIP S STL GRIPTION FULL THRD - [id number]  SCREW BNE L25MM DIA6.5MM CANC HIP S STL GRIPTION FULL THRD  [facility] [id number] Right 1 Implanted and Explanted   CABLE SURG DIA1. 7MM S STL HA CERCLAGE W/ CRMP [[id number]] [[employer]PUY [employer] [place]] - [id number]  CABLE SURG DIA1. 7MM S STL HA CERCLAGE W/ CRMP [[id number]] [DEPUY SYNTHES [facility]]  [facility] [id number] Right 1 Implanted   SCREW BNE L30MM DIA6.5MM CANC HIP S STL GRIPTION FULL THRD - [id number]  SCREW BNE L30MM DIA6.5MM CANC HIP S STL GRIPTION FULL THRD  [facility] [id number] Right 1 Implanted   STEM FEM CLLRD 12 135 DEG HIP SHT NK IMPL GRN CORAIL - [id number]  STEM FEM CLLRD 12 135 DEG HIP SHT NK IMPL GRN CORAIL  [facility] [id number] Right 1 Implanted   LINER     [id number] Right 1 Implanted   BI MENTUM PFR PE LINER [id number] - [id number]  BI MENTUM PFR PE LINER [id number]  [facility] [id number] Right 1 Implanted   HEAD FEM DIA22.225MM +4MM OFFSET 12/14 TAPR HIP MTL ARTC EZ - [id number]  HEAD FEM DIA22.225MM +4MM OFFSET 12/14 TAPR HIP MTL ARTC EZ  [facility] [id number] Right 1 Implanted           Signed By: Epifanio Mckeon MD  11/3/2021,  2:38 PM

## 2021-11-03 NOTE — ANESTHESIA PROCEDURE NOTES
Spinal Block    Start time: 11/3/2021 10:45 AM  End time: 11/3/2021 10:49 AM  Performed by: Wero Rodas MD  Authorized by: Wero Rodas MD     Pre-procedure:   Indications: primary anesthetic  Preanesthetic Checklist: patient identified, risks and benefits discussed, anesthesia consent, site marked, patient being monitored and timeout performed    Timeout Time: 10:45 EDT          Spinal Block:   Patient Position:  Seated  Prep Region:  Lumbar  Prep: chlorhexidine      Location:  L3-4  Technique:  Single shot    Local Dose (mL):  3    Needle:   Needle Type:  Pencan  Needle Gauge:  25 G  Attempts:  1      Events: CSF confirmed, no blood with aspiration and no paresthesia        Assessment:  Insertion:  Uncomplicated  Patient tolerance:  Patient tolerated the procedure well with no immediate complications

## 2021-11-03 NOTE — ANESTHESIA POSTPROCEDURE EVALUATION
Procedure(s):  RIGHT CONVERSION  HIP PINNING TO Pari Nip / Martini Olp.     spinal    Anesthesia Post Evaluation      Multimodal analgesia: multimodal analgesia used between 6 hours prior to anesthesia start to PACU discharge  Patient location during evaluation: PACU  Patient participation: complete - patient participated  Level of consciousness: awake and awake and alert  Pain management: adequate  Airway patency: patent  Anesthetic complications: no  Cardiovascular status: acceptable  Respiratory status: acceptable  Hydration status: acceptable  Post anesthesia nausea and vomiting:  controlled      INITIAL Post-op Vital signs:   Vitals Value Taken Time   /62 11/03/21 1430   Temp 36.3 °C (97.3 °F) 11/03/21 1402   Pulse 83 11/03/21 1430   Resp 16 11/03/21 1430   SpO2 100 % 11/03/21 1430

## 2021-11-03 NOTE — INTERVAL H&P NOTE
Update History & Physical    The Patient's History and Physical of October 27, 2021 was reviewed with the patient and I examined the patient. There was no change. The surgical site was confirmed by the patient and me. Plan:  The risk, benefits, expected outcome, and alternative to the recommended procedure have been discussed with the patient. Patient understands and wants to proceed with the procedure.     Electronically signed by KELIN Colon on 11/3/2021 at 10:03 AM

## 2021-11-03 NOTE — PROGRESS NOTES
TRANSFER - IN REPORT:    Verbal report received from [address] on [facility]  being received from PACU for routine post - op      Report consisted of patients Situation, Background, Assessment and   Recommendations(SBAR). Information from the following report(s) SBAR was reviewed with the receiving nurse. Opportunity for questions and clarification was provided.

## 2021-11-03 NOTE — PERIOP NOTES
TRANSFER - OUT REPORT:    Verbal report given to Rosalind(name) on Uma Mendoza  being transferred to [facility](unit) for routine progression of care       Report consisted of patients Situation, Background, Assessment and   Recommendations(SBAR). Information from the following report(s) OR Summary was reviewed with the receiving nurse. Lines:   Peripheral IV 11/03/21 Posterior; Right Forearm (Active)   Site Assessment Clean, dry, & intact 11/03/21 1435   Phlebitis Assessment 0 11/03/21 1435   Infiltration Assessment 0 11/03/21 1435   Dressing Status Clean, dry, & intact 11/03/21 1435   Dressing Type Tape; Transparent 11/03/21 1435   Hub Color/Line Status Green; Infusing 11/03/21 1435        Opportunity for questions and clarification was provided.       Patient transported with:   [address]

## 2021-11-03 NOTE — PROGRESS NOTES
Problem: Self Care Deficits Care Plan (Adult)  Goal: *Acute Goals and Plan of Care (Insert Text)  Outcome: Progressing Towards Goal  Note: GOALS:   DISCHARGE GOALS (in preparation for going home/rehab):  3 days  1. Ms. Perry Carlos will perform one lower body dressing activity with CGA with adaptive equipment to demonstrate improved functional mobility and safety. 2.  Ms. Perry Carlos will perform one lower body bathing activity with CGA with adaptive equipment to demonstrate improved functional mobility and safety. 3.  Ms. Perry Carlos will perform toileting/toilet transfer with CGA with adaptive equipment to demonstrate improved functional mobility and safety. 4.  Ms. Perry Carlos will perform shower transfer with CGA with adaptive equipment to demonstrate improved functional mobility and safety. 5.  Ms. Perry Carlos will state PHUC precautions with two verbal cues to demonstrate improved functional mobility and safety. JOINT CAMP OCCUPATIONAL THERAPY PHUC: Initial Assessment and Daily Note 11/3/2021  INPATIENT: Hospital Day: 1  [place] / [place] / Product Type: Medicare /      NAME/AGE/GENDER: Roddy Glaser is a 79 y.o. female   PRIMARY DIAGNOSIS:  Primary osteoarthritis of right hip [M16.11]   Procedure(s) and Anesthesia Type:     * RIGHT CONVERSION  HIP PINNING TO PHUC / Clotilda Bring - Spinal (Right)  ICD-10: Treatment Diagnosis:    Pain in Right Hip (M25.551)  Stiffness of Right Hip, Not elsewhere classified (M25.651)      ASSESSMENT:     Ms. Perry Carlos is s/p Right PHUC and presents with decreased weight bearing on R LE and decreased independence with functional mobility and activities of daily living as compared to prior level of function and safety. Patient would benefit from skilled Occupational Therapy to maximize independence and safety with self-care task and functional mobility.   Pt would also benefit from education on lower body adaptive equipment and hip precautions post-surgery in preparation for going home. Mobilized around room and hallway using a rolling walker with assist. Should progress well with ADL's tomorrow. Has a history of orthostatic hypotension after sx, but no symptoms of difficulties day of surgery. This section established at most recent assessment   PROBLEM LIST (Impairments causing functional limitations):  Decreased Strength  Decreased ADL/Functional Activities  Decreased Transfer Abilities  Increased Pain  Increased Fatigue  Decreased Flexibility/Joint Mobility  Decreased Knowledge of Precautions   INTERVENTIONS PLANNED: (Benefits and precautions of occupational therapy have been discussed with the patient.)  Activities of daily living training  Adaptive equipment training  Balance training  Clothing management  Donning&doffing training  Theraputic activity     TREATMENT PLAN: Frequency/Duration: Follow patient 1-2tx to address above goals. Rehabilitation Potential For Stated Goals: Good     RECOMMENDED REHABILITATION/EQUIPMENT: (at time of discharge pending progress): Continue Skilled Therapy. OCCUPATIONAL PROFILE AND HISTORY:   History of Present Injury/Illness (Reason for Referral): Pt presents this date s/p (Right) PHUC Conversion. Past Medical History/Comorbidities:   Ms. Susan Ragland  has a past medical history of Arthritis, Fractured hip ([address] (11/2020), Fractured shoulder (11/2020), Hyperlipidemia, Hypothyroidism, and Sleep apnea. Ms. Susan Ragland  has a past surgical history that includes hx septoplasty (1991); hx refractive surgery (04/2000); and hx orthopaedic (11/2020).   Social History/Living Environment:   Home Environment: Private residence  # Steps to Enter: 0  One/Two Story Residence: Two story (basement)  # of Interior Steps: 16  Interior Rails: Left  Lift Chair Available: No  Living Alone: No  Support Systems: Spouse/Significant Other  Patient Expects to be Discharged toVF Cor[de-identified]ration  Current DME Used/Available at Home: Walker, rolling; Cane, straight; Crutches; Shower chair; Commode, bedside  Tub or Shower Type: Shower    Prior Level of Function/Work/Activity:  Independent prior. Number of Personal Factors/Comorbidities that affect the Plan of Care: Brief history (0):  LOW COMPLEXITY   ASSESSMENT OF OCCUPATIONAL PERFORMANCE[de-identified]   Most Recent Physical Functioning:   Balance  Sitting: Intact  Standing: Pull to stand; With support       Gross Assessment: Yes  Gross Assessment  AROM: Generally decreased, functional  Strength: Generally decreased, functional  Sensation: Intact            Coordination  Fine Motor Skills-Upper: Left Intact; Right Intact  Gross Motor Skills-Upper: Left Intact;  Right Intact         Mental Status  Neurologic State: Alert  Orientation Level: Oriented X4  Cognition: Appropriate decision making  Perception: Appears intact                Basic ADLs (From Assessment) Complex ADLs (From Assessment)   Basic ADL  Feeding: Independent  Oral Facial Hygiene/Grooming: Supervision  Bathing: Minimum assistance  Upper Body Dressing: Setup  Lower Body Dressing: Minimum assistance  Toileting: Minimum assistance     Grooming/Bathing/Dressing Activities of Daily Living                       Functional Transfers  Bathroom Mobility: Minimum assistance  Toilet Transfer : Minimum assistance  Shower Transfer: Minimum assistance     Bed/Mat Mobility  Supine to Sit: Minimum assistance  Sit to Stand: Minimum assistance  Stand to Sit: Minimum assistance  Bed to Chair: Minimum assistance  Scooting: Minimum assistance         Physical Skills Involved:  Range of Motion  Balance  Strength  Activity Tolerance Cognitive Skills Affected (resulting in the inability to perform in a timely and safe manner):  [facility] Psychosocial Skills Affected:  WFL   Number of elements that affect the Plan of Care: 1-3:  LOW COMPLEXITY   CLINICAL DECISION MAKIN [facility] [address] [id number] AM-PAC 6 Clicks   Daily Activity Inpatient Short Form  How much help from another person does the patient currently need. .. Total A Lot A Little None   1. Putting on and taking off regular lower body clothing? [] 1   [x] 2   [] 3   [] 4   2. Bathing (including washing, rinsing, drying)? [] 1   [x] 2   [] 3   [] 4   3. Toileting, which includes using toilet, bedpan or urinal?   [] 1   [x] 2   [] 3   [] 4   4. Putting on and taking off regular upper body clothing? [] 1   [] 2   [] 3   [x] 4   5. Taking care of personal grooming such as brushing teeth? [] 1   [] 2   [] 3   [x] 4   6. Eating meals? [] 1   [] 2   [] 3   [x] 4   © 2007, Trustees of [address] [place] [address] [address] [address], under license to [employer]. All rights reserved     Score:  Initial: 18 Most Recent: X (Date: -- )    Interpretation of Tool:  Represents activities that are increasingly more difficult (i.e. Bed mobility, Transfers, Gait). Medical Necessity:     Skilled intervention continues to be required due to new PHUC. Reason for Services/Other Comments:  Patient continues to require skilled intervention due to   Deficits lsited above  . Use of outcome tool(s) and clinical judgement create a POC that gives a: MODERATE COMPLEXITY            TREATMENT:   (In addition to Assessment/Re-Assessment sessions the following treatments were rendered)     Pre-treatment Symptoms/Complaints:    Pain: Initial:   Pain Intensity 1: 4  Pain Location 1: Hip  Pain Orientation 1: Right  Post Session:  4     Self Care: (10): Procedure(s) (per grid) utilized to improve and/or restore self-care/home management as related to dressing, toileting, and grooming. Required minimal verbal and tactile cueing to facilitate activities of daily living skills. Initial evaluation 5 mintues. Treatment/Session Assessment:     Response to Treatment:  Good, sitting up in recliner.     Education:  [] Home Exercises  [x] Fall Precautions  [x] Hip Precautions [] Going Home Video  [] Knee/Hip Prosthesis Review  [x] Walker Management/Safety [x] Adaptive Equipment as Needed Interdisciplinary Collaboration:   Physical Therapist  Occupational Therapist  Registered Nurse    After treatment position/precautions:   Up in chair  Bed/Chair-wheels locked  Caregiver at bedside  Call light within reach  RN notified     Compliance with Program/Exercises: Compliant all of the time, Will assess as treatment progresses. Recommendations/Intent for next treatment session:  Treatment next visit will focus on increasing Ms. Tracie  independence with bed mobility, transfers, self care, functional mobility, modalities for pain, and patient education.       Total Treatment Duration:  OT Patient Time In/Time Out  Time In: 1550  Time Out: [address],

## 2021-11-03 NOTE — PROGRESS NOTES
Admission assessment complete. Pt resting in bed. Call light within reach. Pt oriented to room and menu. Pt is able to dorsi/plantar flex bilaterally with   +2 pedal pulses. Pt has no complaints of pain at this time. IS at bedside. Pt verbally understands to call for pain medication.

## 2021-11-03 NOTE — ANESTHESIA PREPROCEDURE EVALUATION
Relevant Problems   No relevant active problems       Anesthetic History   No history of anesthetic complications            Review of Systems / Medical History  Patient summary reviewed, nursing notes reviewed and pertinent labs reviewed    Pulmonary        Sleep apnea: CPAP           Neuro/Psych   Within defined limits           Cardiovascular  Within defined limits                Exercise tolerance: >4 METS     GI/Hepatic/Renal  Within defined limits              Endo/Other      Hypothyroidism: well controlled       Other Findings              Physical Exam    Airway  Mallampati: II  TM Distance: 4 - 6 cm  Neck ROM: normal range of motion   Mouth opening: Normal     Cardiovascular  Regular rate and rhythm,  S1 and S2 normal,  no murmur, click, rub, or gallop             Dental  No notable dental hx       Pulmonary  Breath sounds clear to auscultation               Abdominal         Other Findings            Anesthetic Plan    ASA: 2  Anesthesia type: spinal          Induction: Intravenous  Anesthetic plan and risks discussed with: Patient

## 2021-11-04 ENCOUNTER — HOME HEALTH ADMISSION (OUTPATIENT)
Dept: HOME HEALTH SERVICES | Facility: HOME HEALTH | Age: 71
End: 2021-11-04
Payer: MEDICARE

## 2021-11-04 LAB — HGB BLD-MCNC: 8.6 G/DL (ref 11.7–15.4)

## 2021-11-04 PROCEDURE — 2709999900 HC NON-CHARGEABLE SUPPLY

## 2021-11-04 PROCEDURE — 85018 HEMOGLOBIN: CPT

## 2021-11-04 PROCEDURE — 36415 COLL VENOUS BLD VENIPUNCTURE: CPT

## 2021-11-04 PROCEDURE — 97535 SELF CARE MNGMENT TRAINING: CPT

## 2021-11-04 PROCEDURE — 74011250636 HC RX REV CODE- 250/636: Performed by: PHYSICIAN ASSISTANT

## 2021-11-04 PROCEDURE — 65270000029 HC RM PRIVATE

## 2021-11-04 PROCEDURE — 97116 GAIT TRAINING THERAPY: CPT

## 2021-11-04 PROCEDURE — 74011250637 HC RX REV CODE- 250/637: Performed by: ORTHOPAEDIC SURGERY

## 2021-11-04 PROCEDURE — 74011250637 HC RX REV CODE- 250/637: Performed by: PHYSICIAN ASSISTANT

## 2021-11-04 PROCEDURE — 94760 N-INVAS EAR/PLS OXIMETRY 1: CPT

## 2021-11-04 PROCEDURE — 97110 THERAPEUTIC EXERCISES: CPT

## 2021-11-04 PROCEDURE — 99024 POSTOP FOLLOW-UP VISIT: CPT | Performed by: ORTHOPAEDIC SURGERY

## 2021-11-04 PROCEDURE — 74011000250 HC RX REV CODE- 250: Performed by: PHYSICIAN ASSISTANT

## 2021-11-04 RX ORDER — ASPIRIN 81 MG/1
81 TABLET ORAL EVERY 12 HOURS
Qty: 70 TABLET | Refills: 0 | Status: SHIPPED | OUTPATIENT
Start: 2021-11-04 | End: 2021-12-09

## 2021-11-04 RX ORDER — METHOCARBAMOL 500 MG/1
500 TABLET, FILM COATED ORAL
Qty: 30 TABLET | Refills: 1 | Status: SHIPPED | OUTPATIENT
Start: 2021-11-04

## 2021-11-04 RX ORDER — OXYCODONE HYDROCHLORIDE 5 MG/1
5-10 TABLET ORAL
Qty: 60 TABLET | Refills: 0 | Status: SHIPPED | OUTPATIENT
Start: 2021-11-04 | End: 2021-11-09

## 2021-11-04 RX ORDER — CELECOXIB 200 MG/1
200 CAPSULE ORAL
Qty: 60 CAPSULE | Refills: 2 | Status: SHIPPED | OUTPATIENT
Start: 2021-11-04 | End: 2022-02-02

## 2021-11-04 RX ORDER — PROMETHAZINE HYDROCHLORIDE 12.5 MG/1
12.5 TABLET ORAL
Qty: 30 TABLET | Refills: 0 | Status: SHIPPED | OUTPATIENT
Start: 2021-11-04

## 2021-11-04 RX ADMIN — ACETAMINOPHEN 1000 MG: 500 TABLET, FILM COATED ORAL at 17:43

## 2021-11-04 RX ADMIN — CELECOXIB 200 MG: 200 CAPSULE ORAL at 21:29

## 2021-11-04 RX ADMIN — ACETAMINOPHEN 1000 MG: 500 TABLET, FILM COATED ORAL at 05:52

## 2021-11-04 RX ADMIN — SODIUM CHLORIDE 100 ML/HR: 900 INJECTION, SOLUTION INTRAVENOUS at 17:44

## 2021-11-04 RX ADMIN — Medication 1 AMPULE: at 21:29

## 2021-11-04 RX ADMIN — Medication 1 AMPULE: at 08:30

## 2021-11-04 RX ADMIN — ASPIRIN 81 MG: 81 TABLET, COATED ORAL at 21:29

## 2021-11-04 RX ADMIN — CELECOXIB 200 MG: 200 CAPSULE ORAL at 08:30

## 2021-11-04 RX ADMIN — DOCUSATE SODIUM 50MG AND SENNOSIDES 8.6MG 2 TABLET: 8.6; 5 TABLET, FILM COATED ORAL at 08:30

## 2021-11-04 RX ADMIN — ACETAMINOPHEN 1000 MG: 500 TABLET, FILM COATED ORAL at 11:48

## 2021-11-04 RX ADMIN — CEFAZOLIN SODIUM 2 G: 100 INJECTION, POWDER, LYOPHILIZED, FOR SOLUTION INTRAVENOUS at 03:00

## 2021-11-04 RX ADMIN — DEXAMETHASONE SODIUM PHOSPHATE 10 MG: 10 INJECTION INTRAMUSCULAR; INTRAVENOUS at 13:59

## 2021-11-04 RX ADMIN — ASPIRIN 81 MG: 81 TABLET, COATED ORAL at 08:30

## 2021-11-04 RX ADMIN — LEVOTHYROXINE SODIUM 88 MCG: 0.09 TABLET ORAL at 05:52

## 2021-11-04 RX ADMIN — OXYCODONE 10 MG: 5 TABLET ORAL at 11:48

## 2021-11-04 NOTE — PROGRESS NOTES
Problem: Mobility Impaired (Adult and Pediatric)  Goal: *Acute Goals and Plan of Care (Insert Text)  Outcome: Progressing Towards Goal  Note: GOALS (1-4 days):  (1.)Ms. Nito Chew will move from supine to sit and sit to supine  in bed with INDEPENDENT. (2.)Ms. Nito Chew will transfer from bed to chair and chair to bed with INDEPENDENT using the least restrictive device. (3.)Ms. Nito Chew will ambulate with INDEPENDENT for 300 feet with the least restrictive device. (4.)Ms. Nito Chew will ambulate up/down 3 steps with bilateral  railing with MINIMAL ASSIST with no device. (5.)Ms. Nito Chew will state/observe PHUC precautions with 0 verbal cues. ________________________________________________________________________________________________      PHYSICAL THERAPY JOINT CAMP PHUC: Daily Note and PM 11/4/2021  INPATIENT: Hospital Day: 2  [place] / [place] / Product Type: Medicare /      NAME/AGE/GENDER: Wendy Michelle is a 79 y.o. female   PRIMARY DIAGNOSIS:  Primary osteoarthritis of right hip [M16.11]   Procedure(s) and Anesthesia Type:     * RIGHT CONVERSION  HIP PINNING TO PHUC / Geri Em - Spinal (Right)  ICD-10: Treatment Diagnosis:    Pain in Right Hip (M25.551)  Stiffness of Right Hip, Not elsewhere classified (M25.651)  Difficulty in walking, Not elsewhere classified (R26.2)  Other abnormalities of gait and mobility (R26.89)      ASSESSMENT:     Ms. Nito Chew presents with decreased strength and range of motion right lower extremity and with decreased independence with functional mobility s/p  right total hip arthroplasty. Pt will benefit from skilled PT interventions to maximize independence with functional mobility and PHUC management. Pt did well with assessment and activity. Pt performed supine to sit to stand. Pt ambulated in hallway. Pt performed exercise in bedside chair. Pt in bedside chair with needs in reach.  Pt instructed not to get up without assist. Sandieugh to progress mobility and PHUC exercises at next session. Pt plans to discharge to home from the hospital with continued therapy for follow up.   11/4 supine upon arrival.  Performs R TH exercises in the bed with guidance to stay within the hip precaution. Sit>stand with CGA. Stood few min then walk 150 ft using RW with CGA with couple of breaks, just to watch the B/P. Then return to chair with needs in reach and instructed to call for assistance. 11/4 pm sitting in the chair upon arrival.  Stated I need to go to the restroom. Sit>stand with SBA, then walk to the restroom using RW with SBA. Then walk 160 ft using RW with SBA with no LOB. Then return to bed with SBA. Left in supine with needs in reach, ice to R hip and instructed to call for assist.    This section established at most recent assessment   PROBLEM LIST (Impairments causing functional limitations):  Decreased Strength  Decreased ADL/Functional Activities  Decreased Transfer Abilities  Decreased Ambulation Ability/Technique  Increased Pain  Decreased Flexibility/Joint Mobility  Decreased Lewis with Home Exercise Program   INTERVENTIONS PLANNED: (Benefits and precautions of physical therapy have been discussed with the patient.)  Bed Mobility  Cold  Gait Training  Home Exercise Program (HEP)  Range of Motion (ROM)  Therapeutic Activites  Therapeutic Exercise/Strengthening  Transfer Training     TREATMENT PLAN: Frequency/Duration: Follow patient BID for duration of hospital stay to address above goals. Rehabilitation Potential For Stated Goals: Good     RECOMMENDED REHABILITATION/EQUIPMENT: (at time of discharge pending progress): Continue Skilled Therapy and Home Health: Physical Therapy.               HISTORY:   History of Present Injury/Illness (Reason for Referral):  Pt s/p PHUC on 11/3  Past Medical History/Comorbidities:   Ms. Pb Medina  has a past medical history of Arthritis, Fractured hip ([address] (11/2020), Fractured shoulder (11/2020), Hyperlipidemia, Hypothyroidism, and Sleep apnea. Ms. Sravani Gonzalez  has a past surgical history that includes hx septoplasty (); hx refractive surgery (2000); and hx orthopaedic (2020). Social History/Living Environment:   Home Environment: Private residence  # Steps to Enter: 0  One/Two Story Residence: Two story (basement)  # of Interior Steps: 16  Interior Rails: Left  Lift Chair Available: No  Living Alone: No  Support Systems: Spouse/Significant Other  Patient Expects to be Discharged toF Cor[de-identified]ration  Current DME Used/Available at Home: Walker, rolling; Cane, straight; Crutches; Shower chair; Commode, bedside  Tub or Shower Type: Shower    Prior Level of Function/Work/Activity:  Pt independent with ambulation. Number of Personal Factors/Comorbidities that affect the Plan of Care: 0: LOW COMPLEXITY   EXAMINATION:   Most Recent Physical Functioning:                            Bed Mobility  Supine to Sit: Contact guard assistance  Sit to Supine: Stand-by assistance  Scooting: Contact guard assistance    Transfers  Sit to Stand: Stand-by assistance  Stand to Sit: Stand-by assistance  Bed to Chair: Stand-by assistance    Balance  Sitting: Intact  Standing: Intact; With support         Gait Training: Yes    Weight Bearing Status  Right Side Weight Bearing: As tolerated  Distance (ft): 160 Feet (ft)  Ambulation - Level of Assistance: Stand-by assistance  Assistive Device: Walker, rolling  Gait Abnormalities: Decreased step clearance        Braces/Orthotics: none    Right Hip Cold  Type: Cold/ice packs      Body Structures Involved:  Joints  Muscles Body Functions Affected: Movement Related Activities and Participation Affected:   Mobility  Self Care   Number of elements that affect the Plan of Care: 4+: HIGH COMPLEXITY   CLINICAL PRESENTATION:   Presentation: Stable and uncomplicated: LOW COMPLEXITY   CLINICAL DECISION MAKIN \[facility]\"" [address] [address] AM-PAC 6 Clicks   Basic Mobility Inpatient Short Form  How much difficulty does the patient currently have. .. Unable A Lot A Little None   1. Turning over in bed (including adjusting bedclothes, sheets and blankets)? [] 1   [] 2   [x] 3   [] 4   2. Sitting down on and standing up from a chair with arms ( e.g., wheelchair, bedside commode, etc.)   [] 1   [] 2   [x] 3   [] 4   3. Moving from lying on back to sitting on the side of the bed? [] 1   [] 2   [x] 3   [] 4   How much help from another person does the patient currently need. .. Total A Lot A Little None   4. Moving to and from a bed to a chair (including a wheelchair)? [] 1   [] 2   [x] 3   [] 4   5. Need to walk in hospital room? [] 1   [] 2   [x] 3   [] 4   6. Climbing 3-5 steps with a railing? [] 1   [] 2   [x] 3   [] 4   © 2007, Trustees of [address] [place] [address], under license to [employer]. All rights reserved     Score:  Initial: 18 Most Recent: X (Date: -- )    Interpretation of Tool:  Represents activities that are increasingly more difficult (i.e. Bed mobility, Transfers, Gait). Medical Necessity:     Patient is expected to demonstrate progress in   strength, range of motion, and functional technique   to   decrease assistance required with functional mobility and PHUC management  . Reason for Services/Other Comments:  Patient continues to require skilled intervention due to   Inability to complete functional mobility and PHUC management independently  . Use of outcome tool(s) and clinical judgement create a POC that gives a: Clear prediction of patient's progress: LOW COMPLEXITY            TREATMENT:   (In addition to Assessment/Re-Assessment sessions the following treatments were rendered)     Pre-treatment Symptoms/Complaints:    Pain Initial:   Pain Intensity 1: 0  Post Session:  No complaints     Gait Training (15 Minutes ):  Gait training to improve and/or restore physical functioning as related to mobility.   Ambulated 160 Feet (ft) with Stand-by assistance using a Walker, rolling    Therapeutic Exercise: (15 Minutes ):  Exercises per grid below to improve mobility. Assessment   Date:  11/3 Date:  11/4   Date:     ACTIVITY/EXERCISE AM PM AM PM AM PM     []  []  []  []  []  []   Ankle Pumps  10 15 15     Quad Sets  10 15 15     Gluteal Sets  10 15 15     Hip ABd/ADduction  10 15 15     Knee Slides  10 15 15     Short Arc Quads  10 15 15     Long Arc Quads   15 15                                B = bilateral; AA = active assistive; A = active; P = passive      Treatment/Session Assessment:     Response to Treatment:  Pt participated well    Education:  [x] Home Exercises  [x] Fall Precautions  [x] Hip Precautions [] D/C Instruction Review  [] Hip Prosthesis Review  [x] Walker Management/Safety [] Adaptive Equipment as Needed       Interdisciplinary Collaboration:   Physical Therapy Assistant  Registered Nurse    After treatment position/precautions:   Supine in bed  Bed/Chair-wheels locked  Bed in low position  Caregiver at bedside  Call light within reach  RN notified    Compliance with Program/Exercises: Compliant most of the time, Will assess as treatment progresses. Recommendations/Intent for next treatment session:  Treatment next visit will focus on increasing Ms. Jasmina Sow independence with bed mobility, transfers, gait training, strength/ROM exercises, modalities for pain, and patient education.       Total Treatment Duration:  PT Patient Time In/Time Out  Time In: 1345  Time Out: [address] [place]

## 2021-11-04 NOTE — PROGRESS NOTES
Hospitalist Progress Note   Admit Date:  11/3/2021  8:36 AM   Name:  June King   Age:  79 y.o. Sex:  female  :  1950   MRN:  [id number]   Room:      Presenting Complaint: No chief complaint on file. Reason(s) for Admission: Primary osteoarthritis of right hip [M16.11]  Presence of right artificial hip joint [Z96.641]  AVN of femur [facility]) [facility] Course & Interval History:   June King is a 79 y.o. female with history of dyslipidemia, hypothyroidism, remote right femoral neck fx and RUE fx sustained while playing pickle ball 2020 who developed worsening pain to her right gluteal are, right anterior thigh into right knee which limited her daily activities. Pt was tried on outpatient conservative tx with anti-inflammatories, oral steroids and activity modifications but her symptoms worsened. Pt was seen and followed by Dr. Chinyere lee outpatient and ultimately deemed that pt failed conservative tx for her right femoral head AVN warranting surgical intervention to convert right hip pinning to a total right hip arthroplasty.     Pt's  reports that during her last surgery back in 2020 pt has had orthostatic hypotension and syncopal episodes and wanted staff to be aware of this potential occurrence. Pt reports to me that yesterday upon getting up from using the bathroom she felt \"swimmy headed\". Subjective (21):  \"I feel fine but this morning when I went to the bathroom again, I did get that feeling like I was gonna pass out. \"  Pleasant lucid 70y.o. WF sitting up in bed in NAD; admits to dizziness with postural changes from sitting to standing    Review of Systems:  10 systems reviewed and negative except as noted in HPI.       Assessment & Plan:     Principal Problem:    Status post right hip replacement (11/3/2021)  - POD#1; pain controlled  - fall precautions given orthostatic symptoms  - cont PT/OT  - f/u AM hgb     Active Problems:    AVN of femur ([facility] (11/3/2021)  - s/p RTHA; POD#1      Post operative anemia  - no gross bleeding  - trend hgb  - transfusion if hgb<7.0      Dizziness / hx of orthostatic hypotension   - attributed to postural changes  - fall precautions  - repeat orthostatic vs, if positive, consider gentle IVF        Hypothyroidism  - synthroid continued       Dyslipidemia  - statin tx        Diet:  ADULT DIET Regular  DVT PPx: ASA 81mg bid  Code status: Full Code    Hospital Problems as of 11/4/2021 Date Reviewed: 9/14/2021          Codes Class Noted - Resolved POA    * (Principal) Status post right hip replacement ICD-10-CM: F41.361  ICD-9-CM: V43.64  11/3/2021 - Present         AVN of femur ([facility] ICD-10-CM: M87.059  ICD-9-CM: 733.42  11/3/2021 - Present Unknown        Dyslipidemia ICD-10-CM: E78.5  ICD-9-CM: 272.4  11/3/2021 - Present Unknown        Acquired hypothyroidism ICD-10-CM: E03.9  ICD-9-CM: 244.9  11/3/2021 - Present Unknown              Objective:     Patient Vitals for the past 24 hrs:   Temp Pulse Resp BP SpO2   11/04/21 0939    (!) 119/50    11/04/21 0851     100 %   11/04/21 0726 97.9 °F (36.6 °C) 72 17 (!) 98/50 100 %   11/04/21 0645    104/60    11/04/21 0620    (!) 95/52    11/04/21 0400 98 °F (36.7 °C) 77 18 (!) 100/48 93 %   11/03/21 2241 98.2 °F (36.8 °C) 79 18 110/64 97 %   11/03/21 2045  76  112/61    11/03/21 2026     99 %   11/03/21 1948 98 °F (36.7 °C) 72 18 (!) 98/53 99 %   11/03/21 1818     100 %   11/03/21 1448  99 16 137/73 99 %   11/03/21 1435  78 16 131/66 100 %   11/03/21 1430  83 16 117/62 100 %   11/03/21 1425  68 16 123/65 98 %   11/03/21 1420  71 16 (!) 107/57 100 %   11/03/21 1416  75 16 (!) 108/57 100 %   11/03/21 1410  80 16 (!) 104/58 100 %   11/03/21 1402 97.3 °F (36.3 °C) 85 20 (!) 104/58 100 %     Oxygen Therapy  O2 Sat (%): 100 % (11/04/21 0851)  Pulse via Oximetry: 77 beats per minute (11/04/21 0851)  O2 Device: None (Room air) (11/04/21 0851)  O2 Flow Rate (L/min): 0 l/min (11/03/21 1818)    Estimated body mass index is 17.98 kg/m² as calculated from the following:    Height as of 10/12/21: 5' 5.5\" (1.664 m). Weight as of this encounter: 49.8 kg (109 lb 11.2 oz). Intake/Output Summary (Last 24 hours) at 11/4/2021 1123  Last data filed at 11/3/2021 1837  Gross per 24 hour   Intake 1750 ml   Output 1300 ml   Net 450 ml         Physical Exam:     Blood pressure (!) 119/50, pulse 72, temperature 97.9 °F (36.6 °C), resp. rate 17, weight 49.8 kg (109 lb 11.2 oz), SpO2 100 %. General:          Well nourished. No overt distress  Head:               Normocephalic, atraumatic  Eyes:               Sclerae appear normal.  Pupils equally round. ENT:                Nares appear normal, no drainage. Moist oral mucosa  Neck:               No restricted ROM. Trachea midline   CV:                  RRR. No m/r/g. No jugular venous distension. Lungs:             CTAB. No wheezing, rhonchi, or rales. Respirations even, unlabored  Abdomen: Bowel sounds present. Soft, nontender, nondistended. Extremities:     moves all 4 extremities on command; sensation intact x 4  Skin:                No rashes and normal coloration. Warm and dry. Neuro:             CN II-XII grossly intact. Sensation intact. A&Ox3  Psych:             Normal mood and affect. I have reviewed ordered lab tests and independently visualized imaging below:    Recent Labs:  Recent Results (from the past 48 hour(s))   CULTURE, WOUND W GRAM STAIN    Collection Time: 11/03/21 12:00 PM    Specimen: Hip; Wound   Result Value Ref Range    Special Requests: RIGHT   PLATE 1 SWAB        GRAM STAIN PENDING     Culture result:        NO GROWTH AFTER SHORT PERIOD OF INCUBATION. FURTHER RESULTS TO FOLLOW AFTER OVERNIGHT INCUBATION.    CULTURE, WOUND [place] STAIN    Collection Time: 11/03/21 12:10 PM    Specimen: Hip; Wound   Result Value Ref Range    Special Requests: RIGHT   2 SWAB        GRAM STAIN PENDING     Culture result:        NO GROWTH AFTER SHORT PERIOD OF INCUBATION. FURTHER RESULTS TO FOLLOW AFTER OVERNIGHT INCUBATION. HEMOGLOBIN    Collection Time: 11/03/21  6:50 PM   Result Value Ref Range    HGB 9.4 (L) 11.7 - 15.4 g/dL   HEMOGLOBIN    Collection Time: 11/04/21  3:32 AM   Result Value Ref Range    HGB 8.6 (L) 11.7 - 15.4 g/dL       All Micro Results     Procedure Component Value Units Date/Time    CULTURE Frances Lias STAIN [[id number]] Collected: 11/03/21 1210    Order Status: Completed Specimen: Wound from Hip Updated: 11/04/21 0720     Special Requests: --        RIGHT   2 SWAB       GRAM STAIN PENDING     Culture result:       NO GROWTH AFTER SHORT PERIOD OF INCUBATION. FURTHER RESULTS TO FOLLOW AFTER OVERNIGHT INCUBATION. Frances SIMMONS Lias STAIN [[id number]] Collected: 11/03/21 1200    Order Status: Completed Specimen: Wound from Hip Updated: 11/04/21 0719     Special Requests: --        RIGHT   PLATE 1 SWAB       GRAM STAIN PENDING     Culture result:       NO GROWTH AFTER SHORT PERIOD OF INCUBATION. FURTHER RESULTS TO FOLLOW AFTER OVERNIGHT INCUBATION. CULTURE, ANAEROBIC [[id number]] Collected: 11/03/21 1210    Order Status: Completed Specimen: Surgical Specimen Updated: 11/03/21 2128    CULTURE, ANAEROBIC [[id number]] Collected: 11/03/21 1200    Order Status: Completed Specimen: Surgical Specimen Updated: 11/03/21 2127          Other Studies:  XR PELV AP ONLY    Result Date: 11/3/2021  EXAM:  XR PELV AP ONLY INDICATION:   Post op total hip COMPARISON: 9/10/2021. FINDINGS: An AP view of the pelvis demonstrates no fracture, lytic or blastic lesion or other abnormality. There is a right total hip replacement that has replaced the femoral neck system seen on the prior study. The complements are anatomically aligned. Is no evidence of fractures. A single cerclage wire is present. The left hip is unremarkable. .     Unremarkable right total hip replacement.        Current Meds:  Current Facility-Administered Medications   Medication Dose Route Frequency    [START ON 11/7/2021] levothyroxine (SYNTHROID) tablet 100 mcg  100 mcg Oral Once per day on Sun Wed    [START ON 11/5/2021] rosuvastatin (CRESTOR) tablet 5 mg  5 mg Oral EVERY OTHER DAY    alcohol 62% (NOZIN) nasal  1 Ampule  1 Ampule Topical Q12H    0.9% sodium chloride infusion  100 mL/hr IntraVENous CONTINUOUS    acetaminophen (TYLENOL) tablet 1,000 mg  1,000 mg Oral Q6H    celecoxib (CELEBREX) capsule 200 mg  200 mg Oral Q12H    oxyCODONE IR (ROXICODONE) tablet 10 mg  10 mg Oral Q4H PRN    HYDROmorphone (DILAUDID) injection 1 mg  1 mg IntraVENous Q3H PRN    naloxone (NARCAN) injection 0.2-0.4 mg  0.2-0.4 mg IntraVENous Q10MIN PRN    dexamethasone (DECADRON) 10 mg/mL injection 10 mg  10 mg IntraVENous ONCE    ondansetron (ZOFRAN ODT) tablet 4 mg  4 mg Oral Q4H PRN    diphenhydrAMINE (BENADRYL) capsule 25 mg  25 mg Oral Q4H PRN    senna-docusate (PERICOLACE) 8.6-50 mg per tablet 2 Tablet  2 Tablet Oral DAILY    aspirin delayed-release tablet 81 mg  81 mg Oral Q12H    levothyroxine (SYNTHROID) tablet 88 mcg  88 mcg Oral Once per day on Mon Tue Thu Fri Sat       Signed:  KELIN Myers    Part of this note may have been written by using a voice dictation software. The note has been proof read but may still contain some grammatical/other typographical errors.

## 2021-11-04 NOTE — PROGRESS NOTES
Patient received sitting up in bedside recliner, offers no complaints. Patient's  at bedside visiting with patient, will monitor.

## 2021-11-04 NOTE — PROGRESS NOTES
Problem: Self Care Deficits Care Plan (Adult)  Goal: *Acute Goals and Plan of Care (Insert Text)  Outcome: Progressing Towards Goal  Note: GOALS: MET   DISCHARGE GOALS (in preparation for going home/rehab):  3 days  1. Ms. Reyna Jean will perform one lower body dressing activity with CGA with adaptive equipment to demonstrate improved functional mobility and safety. 2.  Ms. Reyna Jean will perform one lower body bathing activity with CGA with adaptive equipment to demonstrate improved functional mobility and safety. 3.  Ms. Reyna Jean will perform toileting/toilet transfer with CGA with adaptive equipment to demonstrate improved functional mobility and safety. 4.  Ms. Reyna Jean will perform shower transfer with CGA with adaptive equipment to demonstrate improved functional mobility and safety. 5.  Ms. Reyna Jean will state PHUC precautions with two verbal cues to demonstrate improved functional mobility and safety. JOINT CAMP OCCUPATIONAL THERAPY PHUC: Daily Note, Discharge, Treatment Day: 1st and AM 11/4/2021  INPATIENT: Hospital Day: 2  [place] / [place] / Product Type: Medicare /      NAME/AGE/GENDER: Kinsey Calix is a 79 y.o. female   PRIMARY DIAGNOSIS:  Primary osteoarthritis of right hip [M16.11]   Procedure(s) and Anesthesia Type:     * RIGHT CONVERSION  HIP PINNING TO PHUC / Evonnie Euler - Spinal (Right)  ICD-10: Treatment Diagnosis:    · Pain in Right Hip (M25.551)  · Stiffness of Right Hip, Not elsewhere classified (M25.651)      ASSESSMENT:     Ms. Reyna Jean is s/p Right PHUC and presents with decreased weight bearing on R LE and decreased independence with functional mobility and activities of daily living as compared to prior level of function and safety. Patient would benefit from skilled Occupational Therapy to maximize independence and safety with self-care task and functional mobility.   Pt would also benefit from education on lower body adaptive equipment and hip precautions post-surgery in preparation for going home. Mobilized around room and hallway using a rolling walker with assist. Should progress well with ADL's tomorrow. Has a history of orthostatic hypotension after sx, but no symptoms of difficulties day of surgery. 11/4/2021  Patient completed shower and dressing as charted below in ADL grid and is ambulating with rolling walker and stand by assist.  Patient has met 5/5 goals and plans to return home with good family support. Family able to provide patient with appropriate level of assistance at this time. OT reviewed hip precautions throughout session. Patient instructed to call for assistance when needing to get up from recliner and all needs in reach. Patient verbalized understanding of call light. This section established at most recent assessment   PROBLEM LIST (Impairments causing functional limitations):  1. Decreased Strength  2. Decreased ADL/Functional Activities  3. Decreased Transfer Abilities  4. Increased Pain  5. Increased Fatigue  6. Decreased Flexibility/Joint Mobility  7. Decreased Knowledge of Precautions   INTERVENTIONS PLANNED: (Benefits and precautions of occupational therapy have been discussed with the patient.)  1. Activities of daily living training  2. Adaptive equipment training  3. Balance training  4. Clothing management  5. Donning&doffing training  6. Theraputic activity     TREATMENT PLAN: Frequency/Duration: Follow patient 1-2tx to address above goals. Rehabilitation Potential For Stated Goals: Good     RECOMMENDED REHABILITATION/EQUIPMENT: (at time of discharge pending progress): Continue Skilled Therapy. OCCUPATIONAL PROFILE AND HISTORY:   History of Present Injury/Illness (Reason for Referral): Pt presents this date s/p (Right) PHUC Conversion.    Past Medical History/Comorbidities:   Ms. Hayes Grubbs  has a past medical history of Arthritis, Fractured hip ([facility] [address] (11/2020), Fractured shoulder (11/2020), Hyperlipidemia, Hypothyroidism, and Sleep apnea. Ms. Aubree Samuels  has a past surgical history that includes hx septoplasty (1991); hx refractive surgery (04/2000); and hx orthopaedic (11/2020). Social History/Living Environment:   Home Environment: Private residence  # Steps to Enter: 0  One/Two Story Residence: Two story (basement)  # of Interior Steps: 16  Interior Rails: Left  Lift Chair Available: No  Living Alone: No  Support Systems: Spouse/Significant Other  Patient Expects to be Discharged toF Cor[de-identified]ration  Current DME Used/Available at Home: Walker, rolling; Cane, straight; Crutches; Shower chair; Commode, bedside  Tub or Shower Type: Shower    Prior Level of Function/Work/Activity:  Independent prior. Number of Personal Factors/Comorbidities that affect the Plan of Care: Brief history (0):  LOW COMPLEXITY   ASSESSMENT OF OCCUPATIONAL PERFORMANCE[de-identified]   Most Recent Physical Functioning:   Balance  Sitting: Intact  Standing: Intact; With support                                               Basic ADLs (From Assessment) Complex ADLs (From Assessment)   Basic ADL  Feeding: Independent  Oral Facial Hygiene/Grooming: Supervision  Bathing: Minimum assistance  Type of Bath: Chlorhexidine (CHG), Full, Shower  Upper Body Dressing: Setup  Lower Body Dressing: Minimum assistance  Toileting: Minimum assistance     Grooming/Bathing/Dressing Activities of Daily Living   Grooming  Grooming Assistance: Supervision (standing at sink)     Upper Body Bathing  Bathing Assistance: Supervision     Lower Body Bathing  Bathing Assistance: Supervision (long handled sponge) Toileting  Toileting Assistance: Supervision  Cues: Verbal cues provided  Adaptive Equipment: Elevated seat; Grab bars;  Walker   Upper Body Dressing Assistance  Dressing Assistance: Supervision Functional Transfers  Bathroom Mobility: Stand-by assistance  Toilet Transfer : Stand-by assistance  Shower Transfer: Stand-by assistance  Cues: Verbal cues provided  Adaptive Equipment: Grab bars; Shower chair with back; Walker (comment)   Lower Body Dressing Assistance  Dressing Assistance: Stand-by assistance  Cues: Verbal cues provided Bed/Mat Mobility  Supine to Sit: Contact guard assistance  Sit to Stand: Contact guard assistance  Stand to Sit: Contact guard assistance  Scooting: Contact guard assistance         Physical Skills Involved:  1. Range of Motion  2. Balance  3. Strength  4. Activity Tolerance Cognitive Skills Affected (resulting in the inability to perform in a timely and safe manner):  1. [facility] Psychosocial Skills Affected:  1. WFL   Number of elements that affect the Plan of Care: 1-3:  LOW COMPLEXITY   CLINICAL DECISION MAKIN[address] [place] [address] AM-PAC 6 Clicks   Daily Activity Inpatient Short Form  How much help from another person does the patient currently need. .. Total A Lot A Little None   1. Putting on and taking off regular lower body clothing? [] 1   [] 2   [] 3   [x] 4   2. Bathing (including washing, rinsing, drying)? [] 1   [] 2   [] 3   [x] 4   3. Toileting, which includes using toilet, bedpan or urinal?   [] 1   [] 2   [] 3   [x] 4   4. Putting on and taking off regular upper body clothing? [] 1   [] 2   [] 3   [x] 4   5. Taking care of personal grooming such as brushing teeth? [] 1   [] 2   [] 3   [x] 4   6. Eating meals? [] 1   [] 2   [] 3   [x] 4   © , Trustees of [address] [place] [address], under license to [id number]. All rights reserved     Score:  Initial: 18 Most Recent: 24 (Date: 2021 )    Interpretation of Tool:  Represents activities that are increasingly more difficult (i.e. Bed mobility, Transfers, Gait). Medical Necessity:     · Skilled intervention continues to be required due to new PHUC. Reason for Services/Other Comments:  · Patient continues to require skilled intervention due to   · Deficits lsited above  · .    Use of outcome tool(s) and clinical judgement create a POC that gives a: MODERATE COMPLEXITY TREATMENT:   (In addition to Assessment/Re-Assessment sessions the following treatments were rendered)     Pre-treatment Symptoms/Complaints:    Pain: Initial:      Post Session:  4     Self Care: (55 min): Procedure(s) (per grid) utilized to improve and/or restore self-care/home management as related to dressing, bathing, toileting, grooming and functional mobility. Required minimal verbal, manual and   cueing to facilitate activities of daily living skills and compensatory activities. Treatment/Session Assessment:     Response to Treatment:  Good, up to shower    Education:  [] Home Exercises  [x] Fall Precautions  [x] Hip Precautions [] Going Home Video  [] Knee/Hip Prosthesis Review  [x] Walker Management/Safety [x] Adaptive Equipment as Needed       Interdisciplinary Collaboration:   o Physical Therapist  o Occupational Therapist  o Registered Nurse    After treatment position/precautions:   o Up in chair  o Bed/Chair-wheels locked  o Call light within reach  o RN notified     Compliance with Program/Exercises: Compliant all of the time. Recommendations/Intent for next treatment session:  Pt doing well all goals met and will do well at home with support from . Patient will be discharged home with home health PT. No further Occupational Therapy warranted, will discharge Occupational Therapy services.       Total Treatment Duration:  OT Patient Time In/Time Out  Time In: 1030  Time Out: [address] [place]on [place]

## 2021-11-04 NOTE — PROGRESS NOTES
Care Management Interventions  PCP Verified by CM: Yes  Mode of Transport at Discharge: Self  Transition of Care Consult (CM Consult): [address]: Yes  Physical Therapy Consult: Yes  Occupational Therapy Consult: Yes  Support Systems: Spouse/Significant Other  Confirm Follow Up Transport: Family  The Plan for Transition of Care is Related to the Following Treatment Goals : improve mobiltiy  The Patient and/or Patient Representative was Provided with a Choice of Provider and Agrees with the Discharge Plan?: Yes  Freedom of Choice List was Provided with Basic Dialogue that Supports the Patient's Individualized Plan of Care/Goals, Treatment Preferences and Shares the Quality Data Associated with the Providers?: Yes  Discharge Location  Discharge Placement: Home with home health  Patient is a 79y.o. year old female admitted for Right PHUC . Patient plans to return home on discharge. Order received to arrange home health. Patient without preference towards agency. Referral sent to [facility]. Patient denies any equipment needs as patient has a walker. Will follow until discharge.

## 2021-11-04 NOTE — PROGRESS NOTES
Patient awake in bed this AM, slept intermittently overnight. Assisted x1 and use of walker to bathroom. Voided x1. Upon getting up from toilet, patient got lightheaded. Patient placed in a w/c and placed back into bed. Repositioned for comfort, cool washcloth placed over forehead and back of neck for comfort. BP 95/52. IVF of NS restarted @ 100cc/hr. Will monitor. 7262: Patient sitting up in bed, feeling better. Offers no complaints. IVF continue infusing. /60. Will monitor.

## 2021-11-04 NOTE — PROGRESS NOTES
11/03/21 2026   Oxygen Therapy   O2 Sat (%) 99 %   Pulse via Oximetry 72 beats per minute   O2 Device None (Room air)   Patient placed on continuous sat monitor . Monitor history deleted prior to placing on patient. Patient working on IS achieving 4000ml!!! Very good effort, technique. Pt's Home CPAP also set up at bedside. Pt sitting up in chair at this time.

## 2021-11-04 NOTE — PROGRESS NOTES
Problem: Mobility Impaired (Adult and Pediatric)  Goal: *Acute Goals and Plan of Care (Insert Text)  Outcome: Progressing Towards Goal  Note: GOALS (1-4 days):  (1.)Ms. Carleen Muhammad will move from supine to sit and sit to supine  in bed with INDEPENDENT. (2.)Ms. Carleen Muhammad will transfer from bed to chair and chair to bed with INDEPENDENT using the least restrictive device. (3.)Ms. Carleen Muhammad will ambulate with INDEPENDENT for 300 feet with the least restrictive device. (4.)Ms. Carleen Muhammad will ambulate up/down 3 steps with bilateral  railing with MINIMAL ASSIST with no device. (5.)Ms. Carleen Muhammad will state/observe PHUC precautions with 0 verbal cues. ________________________________________________________________________________________________      PHYSICAL THERAPY JOINT CAMP PHUC: Daily Note and AM 11/4/2021  INPATIENT: Hospital Day: 2  [place] / Product Type: Medicare /      NAME/AGE/GENDER: Anuj Pena is a 79 y.o. female   PRIMARY DIAGNOSIS:  Primary osteoarthritis of right hip [M16.11]   Procedure(s) and Anesthesia Type:     * RIGHT CONVERSION  HIP PINNING TO PHUC / Marito Crumbly - Spinal (Right)  ICD-10: Treatment Diagnosis:    Pain in Right Hip (M25.551)  Stiffness of Right Hip, Not elsewhere classified (M25.651)  Difficulty in walking, Not elsewhere classified (R26.2)  Other abnormalities of gait and mobility (R26.89)      ASSESSMENT:     Ms. Carleen Muhammad presents with decreased strength and range of motion right lower extremity and with decreased independence with functional mobility s/p  right total hip arthroplasty. Pt will benefit from skilled PT interventions to maximize independence with functional mobility and PHUC management. Pt did well with assessment and activity. Pt performed supine to sit to stand. Pt ambulated in hallway. Pt performed exercise in bedside chair. Pt in bedside chair with needs in reach.  Pt instructed not to get up without assist. [facility] to progress mobility and PHUC exercises at next session. Pt plans to discharge to home from the hospital with continued therapy for follow up.   11/4 supine upon arrival.  Performs R TH exercises in the bed with guidance to stay within the hip precaution. Sit>stand with CGA. Stood few min then walk 150 ft using RW with CGA with couple of breaks, just to watch the B/P. Then return to chair with needs in reach and instructed to call for assistance. This section established at most recent assessment   PROBLEM LIST (Impairments causing functional limitations):  Decreased Strength  Decreased ADL/Functional Activities  Decreased Transfer Abilities  Decreased Ambulation Ability/Technique  Increased Pain  Decreased Flexibility/Joint Mobility  Decreased [place] with Home Exercise Program   INTERVENTIONS PLANNED: (Benefits and precautions of physical therapy have been discussed with the patient.)  Bed Mobility  Cold  Gait Training  Home Exercise Program (HEP)  Range of Motion (ROM)  Therapeutic Activites  Therapeutic Exercise/Strengthening  Transfer Training     TREATMENT PLAN: Frequency/Duration: Follow patient BID for duration of hospital stay to address above goals. Rehabilitation Potential For Stated Goals: Good     RECOMMENDED REHABILITATION/EQUIPMENT: (at time of discharge pending progress): Continue Skilled Therapy and Home Health: Physical Therapy. HISTORY:   History of Present Injury/Illness (Reason for Referral):  Pt s/p PHUC on 11/3  Past Medical History/Comorbidities:   Ms. Ida Sanchez  has a past medical history of Arthritis, Fractured hip ([address] (11/2020), Fractured shoulder (11/2020), Hyperlipidemia, Hypothyroidism, and Sleep apnea. Ms. Ida Sanchez  has a past surgical history that includes hx septoplasty (1991); hx refractive surgery (04/2000); and hx orthopaedic (11/2020).    Social History/Living Environment:   Home Environment: Private residence  # Steps to Enter: 0  One/Two Story Residence: Two story (basement)  # of Interior Steps: [address]: Left  Lift Chair Available: No  Living Alone: No  Support Systems: Spouse/Significant Other  Patient Expects to be Discharged to[de-identified] [employer]  Current DME Used/Available at Home: Walker, rolling; Cane, straight; Crutches; Shower chair; Commode, bedside  Tub or Shower Type: Shower    Prior Level of Function/Work/Activity:  Pt independent with ambulation. Number of Personal Factors/Comorbidities that affect the Plan of Care: 0: LOW COMPLEXITY   EXAMINATION:   Most Recent Physical Functioning:                            Bed Mobility  Supine to Sit: Contact guard assistance  Scooting: Contact guard assistance    Transfers  Sit to Stand: Contact guard assistance  Stand to Sit: Contact guard assistance    Balance  Sitting: Intact  Standing: Pull to stand; With support         Gait Training: Yes    Weight Bearing Status  Right Side Weight Bearing: Non-weight bearing  Distance (ft): 150 Feet (ft) (watch the BP)  Ambulation - Level of Assistance: Contact guard assistance  Assistive Device: Walker, rolling  Gait Abnormalities: Antalgic        Braces/Orthotics: none           Body Structures Involved:  Joints  Muscles Body Functions Affected: Movement Related Activities and Participation Affected: Mobility  Self Care   Number of elements that affect the Plan of Care: 4+: HIGH COMPLEXITY   CLINICAL PRESENTATION:   Presentation: Stable and uncomplicated: LOW COMPLEXITY   CLINICAL DECISION MAKIN [facility] [address] [id number] AM-PAC 6 Clicks   Basic Mobility Inpatient Short Form  How much difficulty does the patient currently have. .. Unable A Lot A Little None   1. Turning over in bed (including adjusting bedclothes, sheets and blankets)? [] 1   [] 2   [x] 3   [] 4   2. Sitting down on and standing up from a chair with arms ( e.g., wheelchair, bedside commode, etc.)   [] 1   [] 2   [x] 3   [] 4   3. Moving from lying on back to sitting on the side of the bed?    [] 1   [] 2   [x] 3   [] 4   How much help from another person does the patient currently need. .. Total A Lot A Little None   4. Moving to and from a bed to a chair (including a wheelchair)? [] 1   [] 2   [x] 3   [] 4   5. Need to walk in hospital room? [] 1   [] 2   [x] 3   [] 4   6. Climbing 3-5 steps with a railing? [] 1   [] 2   [x] 3   [] 4   © 2007, Trustees of [address] [place] [address], under license to [employer]. All rights reserved     Score:  Initial: 18 Most Recent: X (Date: -- )    Interpretation of Tool:  Represents activities that are increasingly more difficult (i.e. Bed mobility, Transfers, Gait). Medical Necessity:     Patient is expected to demonstrate progress in   strength, range of motion, and functional technique   to   decrease assistance required with functional mobility and PHUC management  . Reason for Services/Other Comments:  Patient continues to require skilled intervention due to   Inability to complete functional mobility and PHUC management independently  . Use of outcome tool(s) and clinical judgement create a POC that gives a: Clear prediction of patient's progress: LOW COMPLEXITY            TREATMENT:   (In addition to Assessment/Re-Assessment sessions the following treatments were rendered)     Pre-treatment Symptoms/Complaints:    Pain Initial:   Pain Intensity 1: 0 (sore after therapy)  Post Session:  No complaints     Gait Training (23 Minutes ):  Gait training to improve and/or restore physical functioning as related to mobility. Ambulated 150 Feet (ft) (watch the BP) with Contact guard assistance using a Walker, rolling    Therapeutic Exercise: (15 Minutes ):  Exercises per grid below to improve mobility.      Assessment   Date:  11/3 Date:  11/4   Date:     ACTIVITY/EXERCISE AM PM AM PM AM PM     []  []  []  []  []  []   Ankle Pumps  10 15      Quad Sets  10 15      Gluteal Sets  10 15      Hip ABd/ADduction  10 15      Knee Slides  10 15      Short Arc Quads  10 15      Long Arc Quads   15 B = bilateral; AA = active assistive; A = active; P = passive      Treatment/Session Assessment:     Response to Treatment:  Pt agreeable to exercise. Education:  [x] Home Exercises  [x] Fall Precautions  [x] Hip Precautions [] D/C Instruction Review  [] Hip Prosthesis Review  [x] Walker Management/Safety [] Adaptive Equipment as Needed       Interdisciplinary Collaboration:   Physical Therapy Assistant  Registered Nurse    After treatment position/precautions:   Up in chair  Bed/Chair-wheels locked  Bed in low position  Caregiver at bedside  Call light within reach  RN notified    Compliance with Program/Exercises: Compliant most of the time, Will assess as treatment progresses. Recommendations/Intent for next treatment session:  Treatment next visit will focus on increasing Ms. Waymart Saunders independence with bed mobility, transfers, gait training, strength/ROM exercises, modalities for pain, and patient education.       Total Treatment Duration:  PT Patient Time In/Time Out  Time In: 0830  Time Out: 1402    Daniella Milton, PTA

## 2021-11-04 NOTE — PROGRESS NOTES
2021         Post Op day: 1 Day Post-OpProcedure(s) (LRB):  RIGHT CONVERSION  HIP PINNING TO PHUC / Hellen Angelo (Right)      Admit Date: 11/3/2021  Admit Diagnosis: Primary osteoarthritis of right hip [M16.11]; Presence of right artificial hip joint [Z96.641]; AVN of femur ([address] [facility]    LAB:    Recent Results (from the past 24 hour(s))   CULTURE, WOUND W GRAM STAIN    Collection Time: 21 12:00 PM    Specimen: Hip; Wound   Result Value Ref Range    Special Requests: RIGHT   PLATE 1 SWAB        GRAM STAIN PENDING     Culture result:        NO GROWTH AFTER SHORT PERIOD OF INCUBATION. FURTHER RESULTS TO FOLLOW AFTER OVERNIGHT INCUBATION. CULTURE, WOUND [place] Number STAIN    Collection Time: 21 12:10 PM    Specimen: Hip; Wound   Result Value Ref Range    Special Requests: RIGHT   2 SWAB        GRAM STAIN PENDING     Culture result:        NO GROWTH AFTER SHORT PERIOD OF INCUBATION. FURTHER RESULTS TO FOLLOW AFTER OVERNIGHT INCUBATION. HEMOGLOBIN    Collection Time: 21  6:50 PM   Result Value Ref Range    HGB 9.4 (L) 11.7 - 15.4 g/dL   HEMOGLOBIN    Collection Time: 21  3:32 AM   Result Value Ref Range    HGB 8.6 (L) 11.7 - 15.4 g/dL     Vital Signs:    Patient Vitals for the past 8 hrs:   BP Temp Pulse Resp SpO2   21 0645 104/60       21 0620 (!) 95/52       21 0400 (!) 100/48 98 °F (36.7 °C) 77 18 93 %     Temp (24hrs), Av.9 °F (36.6 °C), Min:97.3 °F (36.3 °C), Max:98.2 °F (36.8 °C)    Body mass index is 17.98 kg/m².   Pain Control:   Pain Assessment  Pain Scale 1: Numeric (0 - 10)  Pain Intensity 1: 0  Pain Location 1: Hip  Pain Orientation 1: Right  Pain Description 1: Aching    Subjective: Doing well, No complaints, No SOB, No Chest Pain, No nausea or vomiting     Objective: Vital Signs are Stable, No Acute Distress, Alert and Oriented, Dressing is dry,  Neurovascular exam is normal.       PT/OT:            Assistive Device: Walker (comment) Wieght Bearing Status: WBAT    Meds:  [unfilled]  [unfilled]  [unfilled]    Assessment:   Patient Active Problem List   Diagnosis Code    Avascular necrosis of femoral head, right (HCC) M87.051    Right hip pain M25.551    DDD (degenerative disc disease), lumbar M51.36    Status post right hip replacement Z96.641    AVN of femur ([address] M87.059    Dyslipidemia E78.5    Acquired hypothyroidism E03.9             Plan: Continue Physical Therapy, Monitor labs, hold discharge until tomorrow        Signed By: Anayeli Flores MD

## 2021-11-05 VITALS
BODY MASS INDEX: 17.98 KG/M2 | HEART RATE: 77 BPM | SYSTOLIC BLOOD PRESSURE: 143 MMHG | RESPIRATION RATE: 20 BRPM | OXYGEN SATURATION: 100 % | DIASTOLIC BLOOD PRESSURE: 71 MMHG | TEMPERATURE: 98 F | WEIGHT: 109.7 LBS

## 2021-11-05 LAB — HGB BLD-MCNC: 7.5 G/DL (ref 11.7–15.4)

## 2021-11-05 PROCEDURE — 85018 HEMOGLOBIN: CPT

## 2021-11-05 PROCEDURE — 74011250637 HC RX REV CODE- 250/637: Performed by: PHYSICIAN ASSISTANT

## 2021-11-05 PROCEDURE — 36415 COLL VENOUS BLD VENIPUNCTURE: CPT

## 2021-11-05 PROCEDURE — 97110 THERAPEUTIC EXERCISES: CPT

## 2021-11-05 PROCEDURE — 97116 GAIT TRAINING THERAPY: CPT

## 2021-11-05 PROCEDURE — 74011250637 HC RX REV CODE- 250/637: Performed by: ORTHOPAEDIC SURGERY

## 2021-11-05 RX ADMIN — ASPIRIN 81 MG: 81 TABLET, COATED ORAL at 08:43

## 2021-11-05 RX ADMIN — DOCUSATE SODIUM 50MG AND SENNOSIDES 8.6MG 2 TABLET: 8.6; 5 TABLET, FILM COATED ORAL at 08:43

## 2021-11-05 RX ADMIN — ACETAMINOPHEN 1000 MG: 500 TABLET, FILM COATED ORAL at 12:02

## 2021-11-05 RX ADMIN — ACETAMINOPHEN 1000 MG: 500 TABLET, FILM COATED ORAL at 00:28

## 2021-11-05 RX ADMIN — CELECOXIB 200 MG: 200 CAPSULE ORAL at 08:43

## 2021-11-05 RX ADMIN — Medication 1 AMPULE: at 08:43

## 2021-11-05 RX ADMIN — ROSUVASTATIN CALCIUM 5 MG: 5 TABLET, COATED ORAL at 08:43

## 2021-11-05 RX ADMIN — LEVOTHYROXINE SODIUM 88 MCG: 0.09 TABLET ORAL at 05:15

## 2021-11-05 NOTE — PROGRESS NOTES
Problem: Mobility Impaired (Adult and Pediatric)  Goal: *Acute Goals and Plan of Care (Insert Text)  Outcome: Progressing Towards Goal  Note: GOALS (1-4 days):  (1.)Ms. Hayes Grubbs will move from supine to sit and sit to supine  in bed with INDEPENDENT. (2.)Ms. Hayes Grubbs will transfer from bed to chair and chair to bed with INDEPENDENT using the least restrictive device. (3.)Ms. Hayes Grubbs will ambulate with INDEPENDENT for 300 feet with the least restrictive device. (4.)Ms. Hayes Grubbs will ambulate up/down 3 steps with bilateral  railing with MINIMAL ASSIST with no device. 11/5  (5.)Ms. Hayes Grubbs will state/observe PHUC precautions with 0 verbal cues. 11/5  ________________________________________________________________________________________________      PHYSICAL THERAPY JOINT CAMP PHUC: Daily Note and AM 11/5/2021  INPATIENT: Hospital Day: 3  [place] / [place] / Product Type: Medicare /      NAME/AGE/GENDER: Joao Ivy is a 79 y.o. female   PRIMARY DIAGNOSIS:  Primary osteoarthritis of right hip [M16.11]   Procedure(s) and Anesthesia Type:     * RIGHT CONVERSION  HIP PINNING TO PHUC / Genice Lob - Spinal (Right)  ICD-10: Treatment Diagnosis:    Pain in Right Hip (M25.551)  Stiffness of Right Hip, Not elsewhere classified (M25.651)  Difficulty in walking, Not elsewhere classified (R26.2)  Other abnormalities of gait and mobility (R26.89)      ASSESSMENT:     Ms. Hayes Grubbs presents with decreased strength and range of motion right lower extremity and with decreased independence with functional mobility s/p  right total hip arthroplasty. Pt will benefit from skilled PT interventions to maximize independence with functional mobility and PHUC management. Pt did well with assessment and activity. Pt performed supine to sit to stand. Pt ambulated in hallway. Pt performed exercise in bedside chair. Pt in bedside chair with needs in reach.  Pt instructed not to get up without assist. Shannan Barber to progress mobility and PHUC exercises at next session. Pt plans to discharge to home from the hospital with continued therapy for follow up.   11/4 supine upon arrival.  Performs R TH exercises in the bed with guidance to stay within the hip precaution. Sit>stand with CGA. Stood few min then walk 150 ft using RW with CGA with couple of breaks, just to watch the B/P. Then return to chair with needs in reach and instructed to call for assistance. 11/4 pm sitting in the chair upon arrival.  Stated I need to go to the restroom. Sit>stand with SBA, then walk to the restroom using RW with SBA. Then walk 160 ft using RW with SBA with no LOB. Then return to bed with SBA. Left in supine with needs in reach, ice to R hip and instructed to call for assist.  11/5 supine upon arrival.  Sit>EOB>stand with SBA. Walk 100 ft to the stair well. Therapist instructed/pt demonstrated going up/down 22 steps with good technique. Then walk another 100 ft to the room. Return to the chair and performs R TH exercises with guidance. Then therapist rolled pt to the gym. Therapist instructed/pt demonstrated with R rail in hand and straight cane in L hand going up/down the stairs with good technique and feels comfortable. Then therapist rolled pt back down to her room. Remain in the chair with needs in reach and instructed to call for assist.  All question answer and ready for D/C.     This section established at most recent assessment   PROBLEM LIST (Impairments causing functional limitations):  Decreased Strength  Decreased ADL/Functional Activities  Decreased Transfer Abilities  Decreased Ambulation Ability/Technique  Increased Pain  Decreased Flexibility/Joint Mobility  Decreased [place] with Home Exercise Program   INTERVENTIONS PLANNED: (Benefits and precautions of physical therapy have been discussed with the patient.)  Bed Mobility  Cold  Gait Training  Home Exercise Program (HEP)  Range of Motion (ROM)  Therapeutic Activites  Therapeutic Exercise/Strengthening  Transfer Training     TREATMENT PLAN: Frequency/Duration: Follow patient BID for duration of hospital stay to address above goals. Rehabilitation Potential For Stated Goals: Good     RECOMMENDED REHABILITATION/EQUIPMENT: (at time of discharge pending progress): Continue Skilled Therapy and Home Health: Physical Therapy. HISTORY:   History of Present Injury/Illness (Reason for Referral):  Pt s/p PHUC on 11/3  Past Medical History/Comorbidities:   Ms. Carleen Muhammad  has a past medical history of Arthritis, Fractured hip ([address] (11/2020), Fractured shoulder (11/2020), Hyperlipidemia, Hypothyroidism, and Sleep apnea. Ms. Carleen Muhammad  has a past surgical history that includes hx septoplasty (1991); hx refractive surgery (04/2000); and hx orthopaedic (11/2020). Social History/Living Environment:   Home Environment: Private residence  # Steps to Enter: 0  One/Two Story Residence: Two story  # of Interior Steps: [id number] [facility] Floor: Left  Lift Chair Available: No  Living Alone: No  Support Systems: Spouse/Significant Other  Patient Expects to be Discharged toVF Cor[de-identified]ration  Current DME Used/Available at Home: Crutches; Cane, straight; Walker, rolling; Commode, bedside  Tub or Shower Type: Shower    Prior Level of Function/Work/Activity:  Pt independent with ambulation. Number of Personal Factors/Comorbidities that affect the Plan of Care: 0: LOW COMPLEXITY   EXAMINATION:   Most Recent Physical Functioning:                            Bed Mobility  Supine to Sit: Stand-by assistance  Sit to Supine:  (left up in the chair )  Scooting: Stand-by assistance    Transfers  Sit to Stand: Stand-by assistance  Stand to Sit: Stand-by assistance  Bed to Chair: Stand-by assistance    Balance  Sitting: Intact  Standing: Intact;  With support              Weight Bearing Status  Right Side Weight Bearing: As tolerated  Distance (ft): 200 Feet (ft)  Ambulation - Level of Assistance: Stand-by assistance  Assistive Device: Walker, rolling  Gait Abnormalities: Decreased step clearance  Number of Stairs Trained: 22  Stairs - Level of Assistance: Stand-by assistance  Rail Use: Both     Braces/Orthotics: none           Body Structures Involved:  Joints  Muscles Body Functions Affected: Movement Related Activities and Participation Affected: Mobility  Self Care   Number of elements that affect the Plan of Care: 4+: HIGH COMPLEXITY   CLINICAL PRESENTATION:   Presentation: Stable and uncomplicated: LOW COMPLEXITY   CLINICAL DECISION MAKIN[address] [place] [id number] AM-PAC 6 Clicks   Basic Mobility Inpatient Short Form  How much difficulty does the patient currently have. .. Unable A Lot A Little None   1. Turning over in bed (including adjusting bedclothes, sheets and blankets)? [] 1   [] 2   [x] 3   [] 4   2. Sitting down on and standing up from a chair with arms ( e.g., wheelchair, bedside commode, etc.)   [] 1   [] 2   [x] 3   [] 4   3. Moving from lying on back to sitting on the side of the bed? [] 1   [] 2   [x] 3   [] 4   How much help from another person does the patient currently need. .. Total A Lot A Little None   4. Moving to and from a bed to a chair (including a wheelchair)? [] 1   [] 2   [x] 3   [] 4   5. Need to walk in hospital room? [] 1   [] 2   [x] 3   [] 4   6. Climbing 3-5 steps with a railing? [] 1   [] 2   [x] 3   [] 4   © , Trustees of [address] [place] [id number], under license to [employer]. All rights reserved     Score:  Initial: 18 Most Recent: X (Date: -- )    Interpretation of Tool:  Represents activities that are increasingly more difficult (i.e. Bed mobility, Transfers, Gait). Medical Necessity:     Patient is expected to demonstrate progress in   strength, range of motion, and functional technique   to   decrease assistance required with functional mobility and PHUC management  .   Reason for Services/Other Comments:  Patient continues to require skilled intervention due to   Inability to complete functional mobility and PHUC management independently  . Use of outcome tool(s) and clinical judgement create a POC that gives a: Clear prediction of patient's progress: LOW COMPLEXITY            TREATMENT:   (In addition to Assessment/Re-Assessment sessions the following treatments were rendered)     Pre-treatment Symptoms/Complaints:    Pain Initial:   Pain Intensity 1: 0  Post Session:      Gait Training (23 Minutes ):  Gait training to improve and/or restore physical functioning as related to mobility. Ambulated 200 Feet (ft) with Stand-by assistance using a Walker, rolling    Therapeutic Exercise: (15 Minutes ):  Exercises per grid below to improve mobility. Assessment   Date:  11/3 Date:  11/4   Date:  11/5     ACTIVITY/EXERCISE AM PM AM PM AM PM     []  []  []  []  []  []   Ankle Pumps  10 15 15 15    Quad Sets  10 15 15 15    Gluteal Sets  10 15 15 15    Hip ABd/ADduction  10 15 15 15    Knee Slides  10 15 15 15    Short Arc Quads  10 15 15 15    Long Arc Quads   15 15 15                               B = bilateral; AA = active assistive; A = active; P = passive      Treatment/Session Assessment:     Response to Treatment:  Pt participated well and ready for D/C    Education:  [x] Home Exercises  [x] Fall Precautions  [x] Hip Precautions [] D/C Instruction Review  [] Hip Prosthesis Review  [x] Walker Management/Safety [] Adaptive Equipment as Needed       Interdisciplinary Collaboration:   Physical Therapy Assistant  Registered Nurse    After treatment position/precautions:   Up in chair  Bed/Chair-wheels locked  Bed in low position  Caregiver at bedside  Call light within reach  RN notified    Compliance with Program/Exercises: Compliant most of the time, Will assess as treatment progresses.     Recommendations/Intent for next treatment session:  Treatment next visit will focus on increasing Ms. Loretta Spry independence with bed mobility, transfers, gait training, strength/ROM exercises, modalities for pain, and patient education.       Total Treatment Duration:  PT Patient Time In/Time Out  Time In: 0845  Time Out: [address] [facility] [place]

## 2021-11-05 NOTE — PROGRESS NOTES
Problem: Falls - Risk of  Goal: *Absence of Falls  Description: Document Kelsea Skill Fall Risk and appropriate interventions in the flowsheet.   Outcome: Progressing Towards Goal  Note: Fall Risk Interventions:  Mobility Interventions: OT consult for ADLs, Patient to call before getting OOB, PT Consult for mobility concerns, PT Consult for assist device competence, Strengthening exercises (ROM-active/passive), Utilize walker, cane, or other assistive device         Medication Interventions: Assess postural VS orthostatic hypotension, Patient to call before getting OOB, Teach patient to arise slowly    Elimination Interventions: Call light in reach, Patient to call for help with toileting needs, Toileting schedule/hourly rounds              Problem: Hip Replacement: Post Op Day 1  Goal: Activity/Safety  Outcome: Progressing Towards Goal  Goal: Diagnostic Test/Procedures  Outcome: Progressing Towards Goal  Goal: Nutrition/Diet  Outcome: Progressing Towards Goal  Goal: Medications  Outcome: Progressing Towards Goal  Goal: Respiratory  Outcome: Progressing Towards Goal  Goal: Treatments/Interventions/Procedures  Outcome: Progressing Towards Goal  Goal: Psychosocial  Outcome: Progressing Towards Goal  Goal: Discharge Planning  Outcome: Progressing Towards Goal  Goal: *Demonstrates progressive activity  Outcome: Progressing Towards Goal  Goal: *Optimal pain control at patient's stated goal  Outcome: Progressing Towards Goal  Goal: *Hemodynamically stable  Outcome: Progressing Towards Goal  Goal: *Discharge plan identified  Outcome: Progressing Towards Goal

## 2021-11-05 NOTE — PROGRESS NOTES
2021         Post Op day: 2 Days Post-Op     Admit Date: 11/3/2021  Admit Diagnosis: Primary osteoarthritis of right hip [M16.11]  Presence of right artificial hip joint [Z96.641]  AVN of femur ([address] [M87.059]        Subjective: Doing well, No complaints. States dizziness has resolved. No SOB, No Chest Pain, No Nausea or Vomiting     Objective:   Vital Signs are Stable, No Acute Distress, Alert and Oriented, Dressing is Dry,  Neurovascular exam is normal.     Assessment / Plan :  Patient Active Problem List   Diagnosis Code    Avascular necrosis of femoral head, right (HCC) M87.051    Right hip pain M25.551    DDD (degenerative disc disease), lumbar M51.36    Status post right hip replacement Z96.641    AVN of femur ([address] M87.059    Dyslipidemia E78.5    Acquired hypothyroidism E03.9      Patient Vitals for the past 8 hrs:   BP Temp Pulse Resp SpO2   21 0315 110/60 98.2 °F (36.8 °C) 64 20 100 %   21 2345 116/62 97.3 °F (36.3 °C) 76 18 97 %    Temp (24hrs), Av.9 °F (36.6 °C), Min:97.3 °F (36.3 °C), Max:98.2 °F (36.8 °C)    Body mass index is 17.98 kg/m².     Lab Results   Component Value Date/Time    HGB 7.5 (L) 2021 03:24 AM      Pt seen by and discussed with [employer] today       Signed By: KELIN Crabtree

## 2021-11-05 NOTE — PROGRESS NOTES
Hospitalist Progress Note   Admit Date:  11/3/2021  8:36 AM   Name:  Leanna Walker   Age:  79 y.o. Sex:  female  :  1950   MRN:  [id number]   Room:  311    Presenting Complaint: No chief complaint on file. Reason(s) for Admission: Primary osteoarthritis of right hip [M16.11]  Presence of right artificial hip joint [Z96.641]  AVN of femur [facility]) [facility] Course & Interval History:   Leanna Walker is a 79 y.o. female with history of dyslipidemia, hypothyroidism, remote right femoral neck fx and RUE fx sustained while playing pickle ball 2020 who developed worsening pain to her right gluteal are, right anterior thigh into right knee which limited her daily activities. Pt was tried on outpatient conservative tx with anti-inflammatories, oral steroids and activity modifications but her symptoms worsened. Pt was seen and followed by Dr. Pelon lee outpatient and ultimately deemed that pt failed conservative tx for her right femoral head AVN warranting surgical intervention to convert right hip pinning to a total right hip arthroplasty.     Pt's  reports that during her last surgery back in 2020 pt has had orthostatic hypotension and syncopal episodes and wanted staff to be aware of this potential occurrence. Pt reports to me that yesterday upon getting up from using the bathroom she felt \"swimmy headed\". Subjective (21):  \"I got up and moved around and I felt fine; I'm ready to go home. \" Pleasant lucid 70y.o. WF sitting up in chair, having just finished walking up and down 11 steps, admits to some fatigue but feels well, denies dizziness; orthostatic vs stable     Review of Systems:  10 systems reviewed and negative except as noted in HPI.       Assessment & Plan:     Principal Problem:    Status post right hip replacement (11/3/2021)  - POD#2; pain controlled  - doing well with PT/OT  - plans per primary team for dc home today     Active Problems:    [facility] (11/3/2021)  - s/p RTHA; POD#2  - plan as above      Post operative anemia  - no gross bleeding  - hgb 7.5 this AM  - hemodynamically stable  - some element of dilutional anemia as pt did have IVF during hospitalization      Dizziness / hx of orthostatic hypotension   - resolved       Hypothyroidism  - synthroid continued       Dyslipidemia  - statin tx     Stable from medical standpoint for dc home today     Diet:  ADULT DIET Regular  DVT PPx: ASA 81mg bid  Code status: Full Code    Hospital Problems as of 11/5/2021 Date Reviewed: 9/14/2021          Codes Class Noted - Resolved POA    * (Principal) Status post right hip replacement ICD-10-CM: S39.089  ICD-9-CM: V43.64  11/3/2021 - Present         AVN of femur [facility] ICD-10-CM: M87.059  ICD-9-CM: 733.42  11/3/2021 - Present Unknown        Dyslipidemia ICD-10-CM: E78.5  ICD-9-CM: 272.4  11/3/2021 - Present Unknown        Acquired hypothyroidism ICD-10-CM: E03.9  ICD-9-CM: 244.9  11/3/2021 - Present Unknown              Objective:     Patient Vitals for the past 24 hrs:   Temp Pulse Resp BP SpO2   11/05/21 0700 98 °F (36.7 °C) 77 20 (!) 143/71 100 %   11/05/21 0315 98.2 °F (36.8 °C) 64 20 110/60 100 %   11/04/21 2345 97.3 °F (36.3 °C) 76 18 116/62 97 %   11/04/21 1942  85  134/68    11/04/21 1941  86  122/61    11/04/21 1939 98.2 °F (36.8 °C) 77 20 (!) 111/54 100 %   11/04/21 1500 97.8 °F (36.6 °C) 63 20 (!) 104/53 98 %     Oxygen Therapy  O2 Sat (%): 100 % (11/05/21 0700)  Pulse via Oximetry: 77 beats per minute (11/04/21 0851)  O2 Device: None (Room air) (11/04/21 2345)  O2 Flow Rate (L/min): 0 l/min (11/03/21 1818)    Estimated body mass index is 17.98 kg/m² as calculated from the following:    Height as of 10/12/21: 5' 5.5\" (1.664 m). Weight as of this encounter: 49.8 kg (109 lb 11.2 oz).     Intake/Output Summary (Last 24 hours) at 11/5/2021 1017  Last data filed at 11/4/2021 1148  Gross per 24 hour   Intake 480 ml   Output    Net 480 ml Physical Exam:     Blood pressure (!) 143/71, pulse 77, temperature 98 °F (36.7 °C), resp. rate 20, weight 49.8 kg (109 lb 11.2 oz), SpO2 100 %. General:          Well nourished. No overt distress  Head:               Normocephalic, atraumatic  Eyes:               Sclerae appear normal.  Pupils equally round. ENT:                Nares appear normal, no drainage. Moist oral mucosa  Neck:               No restricted ROM. Trachea midline   CV:                  RRR. No m/r/g. No jugular venous distension. Lungs:             CTAB. No wheezing, rhonchi, or rales. Respirations even, unlabored  Abdomen: Bowel sounds present. Soft, nontender, nondistended. Extremities:     moves all 4 extremities on command; sensation intact x 4  Skin:                No rashes and normal coloration. Warm and dry. Neuro:             CN II-XII grossly intact. Sensation intact. A&Ox3  Psych:             Normal mood and affect.       I have reviewed ordered lab tests and independently visualized imaging below:    Recent Labs:  Recent Results (from the past 48 hour(s))   CULTURE, ANAEROBIC    Collection Time: 11/03/21 12:00 PM    Specimen: Hip   Result Value Ref Range    Special Requests: RIGHT   PLATE 1 SWAB        Culture result: NO ANAEROBIC GROWTH IN 1 DAY     CULTURE, WOUND W GRAM STAIN    Collection Time: 11/03/21 12:00 PM    Specimen: Hip; Wound   Result Value Ref Range    Special Requests: RIGHT   PLATE 1 SWAB        GRAM STAIN NO WBC'S SEEN      GRAM STAIN NO DEFINITE ORGANISM SEEN      Culture result: NO GROWTH 1 DAY     CULTURE, ANAEROBIC    Collection Time: 11/03/21 12:10 PM    Specimen: Hip   Result Value Ref Range    Special Requests: RIGHT   2 SWAB        Culture result: NO ANAEROBIC GROWTH IN 1 DAY     CULTURE, WOUND W GRAM STAIN    Collection Time: 11/03/21 12:10 PM    Specimen: Hip; Wound   Result Value Ref Range    Special Requests: RIGHT   2 SWAB        GRAM STAIN NO WBC'S SEEN      GRAM STAIN NO DEFINITE ORGANISM SEEN      Culture result: NO GROWTH 1 DAY     HEMOGLOBIN    Collection Time: 11/03/21  6:50 PM   Result Value Ref Range    HGB 9.4 (L) 11.7 - 15.4 g/dL   HEMOGLOBIN    Collection Time: 11/04/21  3:32 AM   Result Value Ref Range    HGB 8.6 (L) 11.7 - 15.4 g/dL   HEMOGLOBIN    Collection Time: 11/05/21  3:24 AM   Result Value Ref Range    HGB 7.5 (L) 11.7 - 15.4 g/dL       All Micro Results     Procedure Component Value Units Date/Time    CULTURE, [place] STAIN [[id number]] Collected: 11/03/21 1210    Order Status: Completed Specimen: Wound from Hip Updated: 11/05/21 0901     Special Requests: --        RIGHT   2 SWAB       GRAM STAIN NO WBC'S SEEN         NO DEFINITE ORGANISM SEEN        Culture result: NO GROWTH 1 DAY       CULTURE, [place] STAIN [[id number]] Collected: 11/03/21 1200    Order Status: Completed Specimen: Wound from Hip Updated: 11/05/21 0900     Special Requests: --        RIGHT   PLATE 1 SWAB       GRAM STAIN NO WBC'S SEEN         NO DEFINITE ORGANISM SEEN        Culture result: NO GROWTH 1 DAY       CULTURE, ANAEROBIC [[id number]] Collected: 11/03/21 1200    Order Status: Completed Specimen: Hip Updated: 11/05/21 0818     Special Requests: --        RIGHT   PLATE 1 SWAB       Culture result:       NO ANAEROBIC GROWTH IN 1 DAY          CULTURE, ANAEROBIC [[id number]] Collected: 11/03/21 1210    Order Status: Completed Specimen: Hip Updated: 11/05/21 0817     Special Requests: --        RIGHT   2 SWAB       Culture result:       NO ANAEROBIC GROWTH IN 1 DAY                Other Studies:  No results found.     Current Meds:  Current Facility-Administered Medications   Medication Dose Route Frequency    [START ON 11/7/2021] levothyroxine (SYNTHROID) tablet 100 mcg  100 mcg Oral Once per day on Sun Wed    rosuvastatin (CRESTOR) tablet 5 mg  5 mg Oral EVERY OTHER DAY    alcohol 62% (NOZIN) nasal  1 Ampule  1 Ampule Topical Q12H    acetaminophen (TYLENOL) tablet 1,000 mg  1,000 mg Oral Q6H    celecoxib (CELEBREX) capsule 200 mg  200 mg Oral Q12H    oxyCODONE IR (ROXICODONE) tablet 10 mg  10 mg Oral Q4H PRN    HYDROmorphone (DILAUDID) injection 1 mg  1 mg IntraVENous Q3H PRN    naloxone (NARCAN) injection 0.2-0.4 mg  0.2-0.4 mg IntraVENous Q10MIN PRN    ondansetron (ZOFRAN ODT) tablet 4 mg  4 mg Oral Q4H PRN    diphenhydrAMINE (BENADRYL) capsule 25 mg  25 mg Oral Q4H PRN    senna-docusate (PERICOLACE) 8.6-50 mg per tablet 2 Tablet  2 Tablet Oral DAILY    aspirin delayed-release tablet 81 mg  81 mg Oral Q12H    levothyroxine (SYNTHROID) tablet 88 mcg  88 mcg Oral Once per day on Mon Tue Thu Fri Sat       Signed:  KELIN Horvath    Part of this note may have been written by using a voice dictation software. The note has been proof read but may still contain some grammatical/other typographical errors.

## 2021-11-05 NOTE — DISCHARGE INSTRUCTIONS
[facility]   Patient Discharge Instructions    Uma Mendoza / [id number] : 1950    Admitted 11/3/2021 Discharged: 2021     IF YOU HAVE ANY PROBLEMS ONCE YOU ARE AT HOME CALL THE FOLLOWING NUMBERS:   Main office number: [phone]      Medications    · The medications you are to continue on are listed on the medication reconciliation sheet. · Narcotic pain medications as well as supplemental iron can cause constipation. If this occurs try stopping the narcotic pain medication and/or the iron. · It is important that you take the medication exactly as they are prescribed. · Medications which increase your risk of blood clots are listed to stop for 5 weeks after surgery as well as medications or supplements which increase your risk of bleeding complications. · Keep your medication in the bottles provided by the pharmacist and keep a list of the medication names, dosages, and times to be taken in your wallet. · Do not take other medications without consulting your doctor. Important Information    Do NOT smoke as this will greatly increase your risk of infection! Resume your prehospital diet. If you have excessive nausea or vomitting call your doctor's office     Leg swelling and warmth is normal for 6 months after surgery. If you experience swelling in your leg elevate you leg while laying down with your toes above your heart. If you have sudden onset severe swelling with leg pain call our office. The stitches deep inside take approximately 6 months to dissolve. There will be sharp shooting, stinging and burning pain. This is normal and will resolve between 3-6 months after surgery. Difficulty sleeping is normal following total Knee and Hip replacement. You may try melatonin, an over-the-counter sleep aid or benadryl to help with sleep. Most patients will resume sleeping through the night 8 weeks after surgery. Home Physical Therapy is arranged.  Home [facility] will contact you within 48 hrs of discharge that you have chosen. If you have not received a call within this time frame please contact that provider you chose. You should be given this information before you leave the hospital.     You are at a risk for falls. Use the rolling walker when walking. Patients who have had a joint replacement should not drive if they are still taking narcotic pain mediation during the daytime hours. Most patients wean themselves off of pain medication within 2-5 weeks after surgery. When to Call the office    - If you have a temperature greater then 101  - Uncontrolled vomiting   - Loose control of your bladder or bowel function  - Are unable to bear any weight   - Need a pain medication refill     Patient Education        Hip Replacement Surgery (Posterior): What to Expect at Home  Your Recovery  Hip replacement surgery replaces the worn parts of your hip joint. When you leave the hospital, you will probably be walking with crutches or a walker. You may be able to climb a few stairs and get in and out of bed and chairs. But you will need someone to help you at home until you have more energy and can move around better. You will go home with a bandage and stitches, staples, skin glue, or tape strips. You can remove the bandage when your doctor tells you to. If you have stitches or staples, your doctor will remove them about 2 weeks after your surgery. Glue or tape strips will fall off on their own over time. You may still have some mild pain, and the area may be swollen for 3 to 4 months after surgery. Your doctor may give you medicine for the pain. You will continue the rehabilitation program (rehab) you started in the hospital. The better you do with your rehab exercises, the sooner you will get your strength and movement back. Most people are able to return to work 4 weeks to 4 months after surgery.   This care sheet gives you a general idea about how long it will take for you to recover. But each person recovers at a different pace. Follow the steps below to get better as quickly as possible. How can you care for yourself at home? Activity    · Your doctor may not want your affected leg to cross the center of your body toward the other leg. If so, your therapist may suggest these ideas:  ? Do not cross your legs. ? Be very careful as you get in or out of bed or a car so your leg does not cross the imaginary line in the middle of your body.     · Go slowly when you climb stairs. Make sure the lights are on. Have someone watch you, if you can. When you climb stairs:  ? Step up first with your unaffected leg. Then bring the affected leg up to the same step. Bring your crutches or cane up. ? To go down stairs, reverse the order. First, put your crutches or cane on the lower step. Then bring the affected leg down to that step. Finally, step down with the unaffected leg.     · You can ride in a car, but stop at least once every hour to get out and walk around.     · You may want to sleep on your back. Don't reach down too far to pull up blankets when you lie in bed.     · If your doctor recommends exercises, do them as directed. You can cut back on your exercises if your muscles start to ache, but don't stop doing them.     · Rest when you feel tired. You may take a nap, but don't stay in bed all day.     · Work with your physical therapist to learn the best way to exercise. You will probably have to use a walker, crutches, or a cane for at least 4 to 6 weeks.     · Your doctor may advise you to stay away from activities that put stress on the joint. This includes sports such as tennis, football, and jogging.     · Try not to sit for too long at one time. You will feel less stiff if you take a short walk about every hour.  When you sit, use chairs with arms, and don't sit in low chairs.     · Do not bend over more than 90 degrees (like the angle in a letter \"L\").     · Sleep on your back with your legs slightly apart or on your side with a pillow between your knees for about 6 weeks or as your doctor tells you. Do not sleep on your stomach or affected leg.     · Ask your doctor when you can drive again.     · Most people are able to return to work 4 weeks to 4 months after surgery.     · Ask your doctor when it is okay for you to have sex. Diet    · By the time you leave the hospital, you will probably be eating your normal diet. Your doctor may recommend that you take iron and vitamin supplements.     · Drink plenty of fluids (unless your doctor tells you not to).   · Eat healthy foods, and watch your portion sizes. Try to stay at your ideal weight. Too much weight puts more stress on your new hip joint.     · You may notice that your bowel movements are not regular right after your surgery. This is common. Try to avoid constipation and straining with bowel movements. You may want to take a fiber supplement every day. If you have not had a bowel movement after a couple of days, ask your doctor about taking a mild laxative. Medicines    · Your doctor will tell you if and when you can restart your medicines. You will also get instructions about taking any new medicines.     · If you take aspirin or some other blood thinner, ask your doctor if and when to start taking it again. Make sure that you understand exactly what your doctor wants you to do.     · Your doctor may give you a blood-thinning medicine to prevent blood clots. If you take a blood thinner, be sure you get instructions about how to take your medicine safely. Blood thinners can cause serious bleeding problems. This medicine could be in pill form or as a shot (injection). If a shot is necessary, your doctor will tell you how to do this.     · Be safe with medicines. Take pain medicines exactly as directed. ? If the doctor gave you a prescription medicine for pain, take it as prescribed.   ? If you are not taking a prescription pain medicine, ask your doctor if you can take an over-the-counter medicine.     · If you think your pain medicine is making you sick to your stomach:  ? Take your medicine after meals (unless your doctor has told you not to). ? Ask your doctor for a different pain medicine.     · If your doctor prescribed antibiotics, take them as directed. Do not stop taking them just because you feel better. You need to take the full course of antibiotics. Incision care    · If your doctor told you how to care for your cut (incision), follow your doctor's instructions. You will have a dressing over the cut. A dressing helps the incision heal and protects it. Your doctor will tell you how to take care of this.     · If you did not get instructions, follow this general advice:  ? If you have strips of tape on the cut the doctor made, leave the tape on for a week or until it falls off.  ? If you have stitches or staples, your doctor will tell you when to come back to have them removed. ? If you have skin glue on the cut, leave it on until it falls off. Skin glue is also called skin adhesive or liquid stitches. ? Change the bandage every day. ? Wash the area daily with warm water, and pat it dry. Don't use hydrogen peroxide or alcohol. They can slow healing. ? You may cover the area with a gauze bandage if it oozes fluid or rubs against clothing. ? You may shower 24 to 48 hours after surgery. Pat the incision dry. Don't swim or take a bath for the first 2 weeks, or until your doctor tells you it is okay. Exercise    · Your physical therapist will teach you exercises to do at home. Always do them as your therapist tells you.     · Avoid activities where you might fall. Ice and elevation    · For pain, put ice or a cold pack on the area for 10 to 20 minutes at a time. Put a thin cloth between the ice and your skin.     · Your ankle may swell for about 3 months.  Prop up your ankle when you ice it or anytime you sit or lie down. Try to keep it above the level of your heart. This will help reduce swelling. Other instructions    · Wear compression stockings if your doctor told you to. These may help to prevent blood clots. Your doctor will tell you how long you need to keep wearing the compression stockings.     · Try to prevent falls. To avoid falling:  ? Arrange furniture so that you will not trip on it. ? Get rid of throw rugs, and move electrical cords out of the way. ? Walk only in areas with plenty of light. ? Put grab bars in showers and bathtubs. ? Try to avoid icy or snowy sidewalks. Choose shoes with good traction, or consider using traction devices that attach to your shoes. ? Wear shoes with sturdy, flat soles. Follow-up care is a key part of your treatment and safety. Be sure to make and go to all appointments, and call your doctor if you are having problems. It's also a good idea to know your test results and keep a list of the medicines you take. When should you call for help? Call 911 anytime you think you may need emergency care. For example, call if:    · You passed out (lost consciousness).     · You have severe trouble breathing.     · You have sudden chest pain and shortness of breath, or you cough up blood. Call your doctor now or seek immediate medical care if:    · You have signs that your hip may be dislocated, including:  ? Severe pain and not being able to stand. ? A crooked leg that looks like your hip is out of position. ? Not being able to bend or straighten your leg.     · Your leg or foot is cool or pale or changes color.     · You cannot feel or move your leg.     · You have signs of a blood clot, such as:  ? Pain in your calf, back of the knee, thigh, or groin. ?  Redness and swelling in your leg or groin.     · Your incision comes open and begins to bleed, or the bleeding increases.     · You feel like your heart is racing or beating irregularly.     · You have signs of infection, such as:  ? Increased pain, swelling, warmth, or redness. ? Red streaks leading from the incision. ? Pus draining from the incision. ? A fever. Watch closely for changes in your health, and be sure to contact your doctor if:    · You do not have a bowel movement after taking a laxative.     · You do not get better as expected. Where can you learn more? Go to http://www.gray.com/  Enter Q746 in the search box to learn more about \"Hip Replacement Surgery (Posterior): What to Expect at Home. \"  Current as of: July 1, 2021               Content Version: 13.0  © 5724-6697 [facility]. Care instructions adapted under license by [employer] (which disclaims liability or warranty for this information). If you have questions about a medical condition or this instruction, always ask your healthcare professional. [place] [address] any warranty or liability for your use of this information. Information obtained by :  I understand that if any problems occur once I am at home I am to contact my physician. I understand and acknowledge receipt of the instructions indicated above.                                                                                                                                            Physician's or R.N.'s Signature                                                                  Date/Time                                                                                                                                              Patient or Representative Signature                                                          Date/Time

## 2021-11-05 NOTE — PROGRESS NOTES
Shift assessment complete. Pt a/ox4 and resting in bed. Dressing to right hip c/d/i with ice in place. Strong bilateral plantar/dorsiflexion with pedal pulses present and palpable +2. IV site c/d/i, patent and capped. No complaints of pain at this time. Bed low and locked, side rails x3, gripper socks on, and call light in reach. Encouraged to call for help if needed and pt verbalized understanding.

## 2021-11-06 ENCOUNTER — HOME CARE VISIT (OUTPATIENT)
Dept: SCHEDULING | Facility: HOME HEALTH | Age: 71
End: 2021-11-06
Payer: MEDICARE

## 2021-11-06 LAB
BACTERIA SPEC CULT: NORMAL
BACTERIA SPEC CULT: NORMAL
GRAM STN SPEC: NORMAL
SERVICE CMNT-IMP: NORMAL
SERVICE CMNT-IMP: NORMAL

## 2021-11-06 PROCEDURE — 400013 HH SOC

## 2021-11-06 PROCEDURE — G0151 HHCP-SERV OF PT,EA 15 MIN: HCPCS

## 2021-11-06 PROCEDURE — 3331090001 HH PPS REVENUE CREDIT

## 2021-11-06 PROCEDURE — 400018 HH-NO PAY CLAIM PROCEDURE

## 2021-11-06 PROCEDURE — 3331090002 HH PPS REVENUE DEBIT

## 2021-11-06 NOTE — Clinical Note
SITUATION. 79year old female patient s/p R PHUC 11/3/21 per Dr Angelito Cano. Hospitalized 11/3/21 through 11/5/21. History of arthritis, R hip fx, R shoulder fx, HLD, hypothyroidism, DAMARIS with CPAP, R hip fracture repair, refractive surgery, septoplasty BACKGROUND. .Lives with . Home is multi level with several steps to enter. Patient has a RW.  
ASSESSMENT. Heddy  Tripsideady  Patient alert and oriented with good participation during visit. Pt presents with R PHUC with aquacel dressing in place and intact with no drainage saturating through and no signs or symptoms of infection. Treatment: Instructed patient in infection control, taking medication as directed, use and application of CP, and signs and symptoms of DVT. Instructed supine and seated PHUC HEP protocol 20xeach of AP, QS, GS, hip abd/add, HS, SAQ Frequency: 1w1, 3w1, 2w2 Follow up: PHUC protocol

## 2021-11-07 VITALS
SYSTOLIC BLOOD PRESSURE: 140 MMHG | HEART RATE: 64 BPM | OXYGEN SATURATION: 95 % | RESPIRATION RATE: 14 BRPM | DIASTOLIC BLOOD PRESSURE: 84 MMHG | TEMPERATURE: 97.9 F

## 2021-11-07 PROCEDURE — 3331090001 HH PPS REVENUE CREDIT

## 2021-11-07 PROCEDURE — 3331090002 HH PPS REVENUE DEBIT

## 2021-11-07 NOTE — HOME HEALTH
SITUATION. 79year old female patient s/p R PHUC 11/3/21 per Dr Isaak Horta. Hospitalized 11/3/21 through 11/5/21. History of arthritis, R hip fx, R shoulder fx, HLD, hypothyroidism, DAMARIS with CPAP, R hip fracture repair, refractive surgery, septoplasty   BACKGROUND. .Lives with . Home is multi level with several steps to enter. Patient has a RW.   ASSESSMENT. [place] Patient alert and oriented with good participation during visit. Pt presents with R PHUC with aquacel dressing in place and intact with no drainage saturating through and no signs or symptoms of infection. Treatment: Instructed patient in infection control, taking medication as directed, use and application of CP, and signs and symptoms of DVT.  Instructed supine and seated PHUC HEP protocol 20xeach of AP, QS, GS, hip abd/add, HS, SAQ  Frequency: 1w1, 3w1, 2w2  Follow up: PHUC protocol

## 2021-11-08 ENCOUNTER — HOME CARE VISIT (OUTPATIENT)
Dept: SCHEDULING | Facility: HOME HEALTH | Age: 71
End: 2021-11-08
Payer: MEDICARE

## 2021-11-08 VITALS
RESPIRATION RATE: 14 BRPM | OXYGEN SATURATION: 98 % | SYSTOLIC BLOOD PRESSURE: 118 MMHG | HEART RATE: 66 BPM | DIASTOLIC BLOOD PRESSURE: 64 MMHG | TEMPERATURE: 98.3 F

## 2021-11-08 PROCEDURE — G0151 HHCP-SERV OF PT,EA 15 MIN: HCPCS

## 2021-11-08 PROCEDURE — 3331090002 HH PPS REVENUE DEBIT

## 2021-11-08 PROCEDURE — 3331090001 HH PPS REVENUE CREDIT

## 2021-11-09 ENCOUNTER — HOME CARE VISIT (OUTPATIENT)
Dept: SCHEDULING | Facility: HOME HEALTH | Age: 71
End: 2021-11-09
Payer: MEDICARE

## 2021-11-09 VITALS
DIASTOLIC BLOOD PRESSURE: 69 MMHG | RESPIRATION RATE: 16 BRPM | SYSTOLIC BLOOD PRESSURE: 152 MMHG | HEART RATE: 71 BPM | TEMPERATURE: 97.7 F

## 2021-11-09 PROCEDURE — 3331090001 HH PPS REVENUE CREDIT

## 2021-11-09 PROCEDURE — 3331090002 HH PPS REVENUE DEBIT

## 2021-11-09 PROCEDURE — G0157 HHC PT ASSISTANT EA 15: HCPCS

## 2021-11-10 ENCOUNTER — HOME CARE VISIT (OUTPATIENT)
Dept: SCHEDULING | Facility: HOME HEALTH | Age: 71
End: 2021-11-10
Payer: MEDICARE

## 2021-11-10 VITALS
HEART RATE: 70 BPM | OXYGEN SATURATION: 99 % | SYSTOLIC BLOOD PRESSURE: 132 MMHG | DIASTOLIC BLOOD PRESSURE: 68 MMHG | TEMPERATURE: 97 F | RESPIRATION RATE: 18 BRPM

## 2021-11-10 PROCEDURE — G0299 HHS/HOSPICE OF RN EA 15 MIN: HCPCS

## 2021-11-10 PROCEDURE — 3331090002 HH PPS REVENUE DEBIT

## 2021-11-10 PROCEDURE — 3331090001 HH PPS REVENUE CREDIT

## 2021-11-11 PROCEDURE — 3331090002 HH PPS REVENUE DEBIT

## 2021-11-11 PROCEDURE — 3331090001 HH PPS REVENUE CREDIT

## 2021-11-12 ENCOUNTER — HOME CARE VISIT (OUTPATIENT)
Dept: SCHEDULING | Facility: HOME HEALTH | Age: 71
End: 2021-11-12
Payer: MEDICARE

## 2021-11-12 ENCOUNTER — HOSPITAL ENCOUNTER (OUTPATIENT)
Dept: LAB | Age: 71
Discharge: HOME | End: 2021-11-12
Payer: MEDICARE

## 2021-11-12 VITALS
SYSTOLIC BLOOD PRESSURE: 128 MMHG | RESPIRATION RATE: 15 BRPM | HEART RATE: 65 BPM | DIASTOLIC BLOOD PRESSURE: 82 MMHG | TEMPERATURE: 98.9 F | OXYGEN SATURATION: 95 %

## 2021-11-12 LAB
BACTERIA SPEC CULT: NORMAL
BACTERIA SPEC CULT: NORMAL
HGB BLD-MCNC: 8.7 G/DL (ref 11.7–15.4)
SERVICE CMNT-IMP: NORMAL
SERVICE CMNT-IMP: NORMAL

## 2021-11-12 PROCEDURE — 3331090002 HH PPS REVENUE DEBIT

## 2021-11-12 PROCEDURE — G0299 HHS/HOSPICE OF RN EA 15 MIN: HCPCS

## 2021-11-12 PROCEDURE — 85018 HEMOGLOBIN: CPT

## 2021-11-12 PROCEDURE — G0157 HHC PT ASSISTANT EA 15: HCPCS

## 2021-11-12 PROCEDURE — 3331090001 HH PPS REVENUE CREDIT

## 2021-11-13 PROCEDURE — 3331090002 HH PPS REVENUE DEBIT

## 2021-11-13 PROCEDURE — 3331090001 HH PPS REVENUE CREDIT

## 2021-11-14 VITALS
TEMPERATURE: 97 F | SYSTOLIC BLOOD PRESSURE: 150 MMHG | RESPIRATION RATE: 17 BRPM | OXYGEN SATURATION: 98 % | HEART RATE: 88 BPM | DIASTOLIC BLOOD PRESSURE: 78 MMHG

## 2021-11-14 PROCEDURE — 3331090002 HH PPS REVENUE DEBIT

## 2021-11-14 PROCEDURE — 3331090001 HH PPS REVENUE CREDIT

## 2021-11-15 PROCEDURE — 3331090001 HH PPS REVENUE CREDIT

## 2021-11-15 PROCEDURE — 3331090002 HH PPS REVENUE DEBIT

## 2021-11-16 ENCOUNTER — HOME CARE VISIT (OUTPATIENT)
Dept: SCHEDULING | Facility: HOME HEALTH | Age: 71
End: 2021-11-16
Payer: MEDICARE

## 2021-11-16 VITALS
SYSTOLIC BLOOD PRESSURE: 152 MMHG | HEART RATE: 83 BPM | DIASTOLIC BLOOD PRESSURE: 82 MMHG | RESPIRATION RATE: 17 BRPM | TEMPERATURE: 97.5 F | OXYGEN SATURATION: 97 %

## 2021-11-16 PROCEDURE — G0157 HHC PT ASSISTANT EA 15: HCPCS

## 2021-11-16 PROCEDURE — 3331090001 HH PPS REVENUE CREDIT

## 2021-11-16 PROCEDURE — 3331090002 HH PPS REVENUE DEBIT

## 2021-11-17 ENCOUNTER — HOME CARE VISIT (OUTPATIENT)
Dept: SCHEDULING | Facility: HOME HEALTH | Age: 71
End: 2021-11-17
Payer: MEDICARE

## 2021-11-17 VITALS
SYSTOLIC BLOOD PRESSURE: 155 MMHG | RESPIRATION RATE: 18 BRPM | DIASTOLIC BLOOD PRESSURE: 80 MMHG | TEMPERATURE: 98.9 F | HEART RATE: 79 BPM | OXYGEN SATURATION: 98 %

## 2021-11-17 PROCEDURE — G0299 HHS/HOSPICE OF RN EA 15 MIN: HCPCS

## 2021-11-17 PROCEDURE — 3331090001 HH PPS REVENUE CREDIT

## 2021-11-17 PROCEDURE — 3331090002 HH PPS REVENUE DEBIT

## 2021-11-18 PROCEDURE — 3331090001 HH PPS REVENUE CREDIT

## 2021-11-18 PROCEDURE — 3331090002 HH PPS REVENUE DEBIT

## 2021-11-19 ENCOUNTER — HOME CARE VISIT (OUTPATIENT)
Dept: SCHEDULING | Facility: HOME HEALTH | Age: 71
End: 2021-11-19
Payer: MEDICARE

## 2021-11-19 PROCEDURE — G0157 HHC PT ASSISTANT EA 15: HCPCS

## 2021-11-19 PROCEDURE — 3331090001 HH PPS REVENUE CREDIT

## 2021-11-19 PROCEDURE — 3331090002 HH PPS REVENUE DEBIT

## 2021-11-20 VITALS
OXYGEN SATURATION: 99 % | DIASTOLIC BLOOD PRESSURE: 90 MMHG | TEMPERATURE: 97.5 F | SYSTOLIC BLOOD PRESSURE: 150 MMHG | RESPIRATION RATE: 17 BRPM | HEART RATE: 78 BPM

## 2021-11-20 PROCEDURE — 3331090001 HH PPS REVENUE CREDIT

## 2021-11-20 PROCEDURE — 3331090002 HH PPS REVENUE DEBIT

## 2021-11-21 PROCEDURE — 3331090002 HH PPS REVENUE DEBIT

## 2021-11-21 PROCEDURE — 3331090001 HH PPS REVENUE CREDIT

## 2021-11-22 ENCOUNTER — HOME CARE VISIT (OUTPATIENT)
Dept: SCHEDULING | Facility: HOME HEALTH | Age: 71
End: 2021-11-22
Payer: MEDICARE

## 2021-11-22 VITALS
TEMPERATURE: 97.3 F | DIASTOLIC BLOOD PRESSURE: 84 MMHG | HEART RATE: 70 BPM | OXYGEN SATURATION: 98 % | RESPIRATION RATE: 17 BRPM | SYSTOLIC BLOOD PRESSURE: 140 MMHG

## 2021-11-22 PROCEDURE — 3331090002 HH PPS REVENUE DEBIT

## 2021-11-22 PROCEDURE — G0157 HHC PT ASSISTANT EA 15: HCPCS

## 2021-11-22 PROCEDURE — 3331090001 HH PPS REVENUE CREDIT

## 2021-11-23 PROCEDURE — 3331090001 HH PPS REVENUE CREDIT

## 2021-11-23 PROCEDURE — 3331090002 HH PPS REVENUE DEBIT

## 2021-11-24 ENCOUNTER — HOME CARE VISIT (OUTPATIENT)
Dept: SCHEDULING | Facility: HOME HEALTH | Age: 71
End: 2021-11-24
Payer: MEDICARE

## 2021-11-24 PROCEDURE — 3331090001 HH PPS REVENUE CREDIT

## 2021-11-24 PROCEDURE — 3331090002 HH PPS REVENUE DEBIT

## 2021-11-24 PROCEDURE — G0151 HHCP-SERV OF PT,EA 15 MIN: HCPCS

## 2021-11-25 PROCEDURE — 3331090002 HH PPS REVENUE DEBIT

## 2021-11-25 PROCEDURE — 3331090001 HH PPS REVENUE CREDIT

## 2021-11-26 VITALS
TEMPERATURE: 98.1 F | SYSTOLIC BLOOD PRESSURE: 126 MMHG | DIASTOLIC BLOOD PRESSURE: 74 MMHG | HEART RATE: 73 BPM | OXYGEN SATURATION: 98 % | RESPIRATION RATE: 18 BRPM

## 2021-11-26 PROCEDURE — 3331090002 HH PPS REVENUE DEBIT

## 2021-11-26 PROCEDURE — 3331090001 HH PPS REVENUE CREDIT

## 2021-11-27 PROCEDURE — 3331090001 HH PPS REVENUE CREDIT

## 2021-11-27 PROCEDURE — 3331090002 HH PPS REVENUE DEBIT

## 2021-11-28 PROCEDURE — 3331090002 HH PPS REVENUE DEBIT

## 2021-11-28 PROCEDURE — 3331090001 HH PPS REVENUE CREDIT

## 2022-03-18 PROBLEM — M87.059 AVN OF FEMUR (HCC): Status: ACTIVE | Noted: 2021-11-03

## 2022-03-18 PROBLEM — M51.369 DDD (DEGENERATIVE DISC DISEASE), LUMBAR: Status: ACTIVE | Noted: 2021-09-16

## 2022-03-18 PROBLEM — M87.051 AVASCULAR NECROSIS OF FEMORAL HEAD, RIGHT (HCC): Status: ACTIVE | Noted: 2021-09-16

## 2022-03-18 PROBLEM — M51.36 DDD (DEGENERATIVE DISC DISEASE), LUMBAR: Status: ACTIVE | Noted: 2021-09-16

## 2022-03-18 PROBLEM — M25.551 RIGHT HIP PAIN: Status: ACTIVE | Noted: 2021-09-16

## 2022-03-18 PROBLEM — E78.5 DYSLIPIDEMIA: Status: ACTIVE | Noted: 2021-11-03

## 2022-03-20 PROBLEM — Z96.641 STATUS POST RIGHT HIP REPLACEMENT: Status: ACTIVE | Noted: 2021-11-03

## 2022-03-20 PROBLEM — E03.9 ACQUIRED HYPOTHYROIDISM: Status: ACTIVE | Noted: 2021-11-03

## 2022-07-05 ENCOUNTER — APPOINTMENT (RX ONLY)
Dept: URBAN - METROPOLITAN AREA CLINIC 329 | Facility: CLINIC | Age: 72
Setting detail: DERMATOLOGY
End: 2022-07-05

## 2022-07-05 DIAGNOSIS — L70.8 OTHER ACNE: ICD-10-CM

## 2022-07-05 DIAGNOSIS — L57.0 ACTINIC KERATOSIS: ICD-10-CM

## 2022-07-05 DIAGNOSIS — D17 BENIGN LIPOMATOUS NEOPLASM: ICD-10-CM

## 2022-07-05 DIAGNOSIS — L73.8 OTHER SPECIFIED FOLLICULAR DISORDERS: ICD-10-CM

## 2022-07-05 DIAGNOSIS — D485 NEOPLASM OF UNCERTAIN BEHAVIOR OF SKIN: ICD-10-CM

## 2022-07-05 DIAGNOSIS — Z71.89 OTHER SPECIFIED COUNSELING: ICD-10-CM

## 2022-07-05 DIAGNOSIS — L81.4 OTHER MELANIN HYPERPIGMENTATION: ICD-10-CM

## 2022-07-05 PROBLEM — D17.21 BENIGN LIPOMATOUS NEOPLASM OF SKIN AND SUBCUTANEOUS TISSUE OF RIGHT ARM: Status: ACTIVE | Noted: 2022-07-05

## 2022-07-05 PROBLEM — D48.5 NEOPLASM OF UNCERTAIN BEHAVIOR OF SKIN: Status: ACTIVE | Noted: 2022-07-05

## 2022-07-05 PROCEDURE — ? ADDITIONAL NOTES

## 2022-07-05 PROCEDURE — ? TREATMENT REGIMEN

## 2022-07-05 PROCEDURE — ? COUNSELING

## 2022-07-05 PROCEDURE — 17000 DESTRUCT PREMALG LESION: CPT | Mod: 59

## 2022-07-05 PROCEDURE — 17003 DESTRUCT PREMALG LES 2-14: CPT

## 2022-07-05 PROCEDURE — 11103 TANGNTL BX SKIN EA SEP/ADDL: CPT

## 2022-07-05 PROCEDURE — 99213 OFFICE O/P EST LOW 20 MIN: CPT | Mod: 25

## 2022-07-05 PROCEDURE — ? BIOPSY BY SHAVE METHOD

## 2022-07-05 PROCEDURE — ? OTHER

## 2022-07-05 PROCEDURE — ? BENIGN DESTRUCTION COSMETIC

## 2022-07-05 PROCEDURE — ? LIQUID NITROGEN

## 2022-07-05 PROCEDURE — ? FULL BODY SKIN EXAM - DECLINED

## 2022-07-05 PROCEDURE — ? DEFER

## 2022-07-05 PROCEDURE — 11102 TANGNTL BX SKIN SINGLE LES: CPT

## 2022-07-05 ASSESSMENT — LOCATION DETAILED DESCRIPTION DERM
LOCATION DETAILED: RIGHT INFERIOR MEDIAL FOREHEAD
LOCATION DETAILED: LEFT DISTAL PRETIBIAL REGION
LOCATION DETAILED: RIGHT CENTRAL ZYGOMA
LOCATION DETAILED: LEFT LOWER CUTANEOUS LIP
LOCATION DETAILED: LEFT ANTERIOR DISTAL THIGH
LOCATION DETAILED: RIGHT UPPER CUTANEOUS LIP
LOCATION DETAILED: RIGHT MEDIAL ZYGOMA
LOCATION DETAILED: LEFT MEDIAL UPPER BACK
LOCATION DETAILED: RIGHT PROXIMAL PRETIBIAL REGION
LOCATION DETAILED: RIGHT INFERIOR UPPER BACK
LOCATION DETAILED: LEFT PROXIMAL PRETIBIAL REGION
LOCATION DETAILED: RIGHT ANTERIOR DISTAL UPPER ARM

## 2022-07-05 ASSESSMENT — LOCATION SIMPLE DESCRIPTION DERM
LOCATION SIMPLE: RIGHT UPPER ARM
LOCATION SIMPLE: LEFT UPPER BACK
LOCATION SIMPLE: LEFT THIGH
LOCATION SIMPLE: RIGHT LIP
LOCATION SIMPLE: LEFT PRETIBIAL REGION
LOCATION SIMPLE: RIGHT FOREHEAD
LOCATION SIMPLE: RIGHT UPPER BACK
LOCATION SIMPLE: RIGHT PRETIBIAL REGION
LOCATION SIMPLE: RIGHT ZYGOMA
LOCATION SIMPLE: LEFT LIP

## 2022-07-05 ASSESSMENT — LOCATION ZONE DERM
LOCATION ZONE: LIP
LOCATION ZONE: ARM
LOCATION ZONE: TRUNK
LOCATION ZONE: LEG
LOCATION ZONE: FACE

## 2022-07-05 NOTE — PROCEDURE: DEFER
Instructions (Optional): Pt lives In [place] half the year. Will schedule excision for April 2023
Detail Level: Detailed
Introduction Text (Please End With A Colon): The following procedure was deferred:

## 2022-07-05 NOTE — PROCEDURE: OTHER
Render Risk Assessment In Note?: no
Note Text (......Xxx Chief Complaint.): This diagnosis correlates with the
Other (Free Text): Not scheduling that far out yet due to maternity leave and schedule changing. Asked patient to call around December to schedule
Detail Level: Zone

## 2022-07-05 NOTE — PROCEDURE: BIOPSY BY SHAVE METHOD
Detail Level: Detailed
Depth Of Biopsy: dermis
Was A Bandage Applied: Yes
Size Of Lesion In Cm: 0
Biopsy Type: H and E
Biopsy Method: Dermablade
Anesthesia Type: 1% lidocaine with epinephrine and a 1:10 solution of 8.4% sodium bicarbonate
Anesthesia Volume In Cc (Will Not Render If 0): 0.5
Hemostasis: Aluminum Chloride and Electrocautery
Wound Care: Aquaphor
Dressing: bandage
Destruction After The Procedure: No
Type Of Destruction Used: Curettage
Curettage Text: The wound bed was treated with curettage after the biopsy was performed.
Cryotherapy Text: The wound bed was treated with cryotherapy after the biopsy was performed.
Electrodesiccation Text: The wound bed was treated with electrodesiccation after the biopsy was performed.
Electrodesiccation And Curettage Text: The wound bed was treated with electrodesiccation and curettage after the biopsy was performed.
Silver Nitrate Text: The wound bed was treated with silver nitrate after the biopsy was performed.
Lab: 6
Consent: Written consent was obtained and risks were reviewed including but not limited to scarring, infection, bleeding, scabbing, incomplete removal, nerve damage and allergy to anesthesia.
Post-Care Instructions: I reviewed with the patient in detail post-care instructions. Patient is to keep the biopsy site dry overnight, and then apply bacitracin twice daily until healed. Patient may apply hydrogen peroxide soaks to remove any crusting.
Notification Instructions: Patient will be notified of biopsy results. However, patient instructed to call the office if not contacted within 2 weeks.
Billing Type: Third-Party Bill
Information: Selecting Yes will display possible errors in your note based on the variables you have selected. This validation is only offered as a suggestion for you. PLEASE NOTE THAT THE VALIDATION TEXT WILL BE REMOVED WHEN YOU FINALIZE YOUR NOTE. IF YOU WANT TO FAX A PRELIMINARY NOTE YOU WILL NEED TO TOGGLE THIS TO 'NO' IF YOU DO NOT WANT IT IN YOUR FAXED NOTE.

## 2022-07-05 NOTE — PROCEDURE: BENIGN DESTRUCTION COSMETIC
Consent: The patient's consent was obtained including but not limited to risks of crusting, scabbing, blistering, scarring, darker or lighter pigmentary change, recurrence, incomplete removal and infection.
Price (Use Numbers Only, No Special Characters Or $): 50
Post-Care Instructions: I reviewed with the patient in detail post-care instructions. Patient is to wear sunprotection, and avoid picking at any of the treated lesions. Pt may apply Vaseline to crusted or scabbing areas.
Detail Level: Detailed
Anesthesia Volume In Cc: 0.5
Price (Use Numbers Only, No Special Characters Or $): 0

## 2022-07-05 NOTE — PROCEDURE: MIPS QUALITY
Quality 111:Pneumonia Vaccination Status For Older Adults: Pneumococcal vaccine administered on or after patient’s 60th birthday and before the end of the measurement period
Quality 431: Preventive Care And Screening: Unhealthy Alcohol Use - Screening: Patient not identified as an unhealthy alcohol user when screened for unhealthy alcohol use using a systematic screening method
Detail Level: Detailed
Quality 110: Preventive Care And Screening: Influenza Immunization: Influenza Immunization previously received during influenza season
Quality 226: Preventive Care And Screening: Tobacco Use: Screening And Cessation Intervention: Patient screened for tobacco use and is an ex/non-smoker

## 2022-08-25 ENCOUNTER — APPOINTMENT (RX ONLY)
Dept: URBAN - METROPOLITAN AREA CLINIC 329 | Facility: CLINIC | Age: 72
Setting detail: DERMATOLOGY
End: 2022-08-25

## 2022-08-25 DIAGNOSIS — L57.0 ACTINIC KERATOSIS: ICD-10-CM

## 2022-08-25 PROBLEM — D04.72 CARCINOMA IN SITU OF SKIN OF LEFT LOWER LIMB, INCLUDING HIP: Status: ACTIVE | Noted: 2022-08-25

## 2022-08-25 PROCEDURE — ? CURETTAGE AND DESTRUCTION

## 2022-08-25 PROCEDURE — ? COUNSELING

## 2022-08-25 PROCEDURE — ? ADDITIONAL NOTES

## 2022-08-25 PROCEDURE — 17262 DSTRJ MAL LES T/A/L 1.1-2.0: CPT

## 2022-08-25 PROCEDURE — ? LIQUID NITROGEN

## 2022-08-25 PROCEDURE — ? FULL BODY SKIN EXAM - DECLINED

## 2022-08-25 PROCEDURE — 17000 DESTRUCT PREMALG LESION: CPT | Mod: 59

## 2022-08-25 ASSESSMENT — LOCATION SIMPLE DESCRIPTION DERM: LOCATION SIMPLE: RIGHT ZYGOMA

## 2022-08-25 ASSESSMENT — LOCATION ZONE DERM: LOCATION ZONE: FACE

## 2022-08-25 ASSESSMENT — LOCATION DETAILED DESCRIPTION DERM: LOCATION DETAILED: RIGHT CENTRAL ZYGOMA

## 2022-08-25 NOTE — PROCEDURE: LIQUID NITROGEN
Duration Of Freeze Thaw-Cycle (Seconds): 3
Show Aperture Variable?: Yes
Consent: The patient's consent was obtained including but not limited to risks of crusting, scabbing, blistering, scarring, darker or lighter pigmentary change, recurrence, incomplete removal and infection.
Number Of Freeze-Thaw Cycles: 1 freeze-thaw cycle
Render Note In Bullet Format When Appropriate: No
Post-Care Instructions: I reviewed with the patient in detail post-care instructions. Patient is to wear sunprotection, and avoid picking at any of the treated lesions. Pt may apply Vaseline to crusted or scabbing areas.
Detail Level: Detailed

## 2022-08-25 NOTE — PROCEDURE: CURETTAGE AND DESTRUCTION
Detail Level: Detailed
Biopsy Photograph Reviewed: Yes
Number Of Curettages: 3
Size Of Lesion In Cm: 0.8
Size Of Lesion After Curettage: 1.3
Add Intralesional Injection: No
Total Volume (Ccs): 1
Anesthesia Type: 1% lidocaine with epinephrine and a 1:10 solution of 8.4% sodium bicarbonate
Anesthesia Volume In Cc: 4
Cautery Type: electrodesiccation
What Was Performed First?: Curettage
Final Size Statement: The size of the lesion after curettage was
Additional Information: (Optional): The wound was cleaned, and a pressure dressing was applied.  The patient received detailed post-op instructions.
Consent was obtained from the patient. The risks, benefits and alternatives to therapy were discussed in detail. Specifically, the risks of infection, scarring, bleeding, prolonged wound healing, nerve injury, incomplete removal, allergy to anesthesia and recurrence were addressed. Alternatives to ED&C, such as: surgical removal and XRT were also discussed.  Prior to the procedure, the treatment site was clearly identified and confirmed by the patient.
Post-Care Instructions: I reviewed with the patient in detail post-care instructions. Patient is to keep the area dry for 48 hours, and not to engage in any swimming until the area is healed. Should the patient develop any fevers, chills, bleeding, severe pain patient will contact the office immediately.
Bill As A Line Item Or As Units: Line Item

## 2023-05-12 ENCOUNTER — NURSE TRIAGE (OUTPATIENT)
Dept: OTHER | Facility: CLINIC | Age: 73
End: 2023-05-12

## 2023-05-12 NOTE — TELEPHONE ENCOUNTER
Location of patient: [place]    Received call from Justus Bocanegra at [facility]; Patient with Red Flag Complaint requesting to establish care with n/a. Subjective: Caller states \"congestion and difficulty breathing\"     Patient calling in form the parking lot of [address] wanting to know if there is an available walk-in for congestion. Patient not wanting to establish a new PCP. No triage required. Attention Provider: Thank you for allowing me to participate in the care of your patient. The patient was connected to triage in response to information provided to the [facility]. Please do not respond through this encounter as the response is not directed to a shared pool. Reason for Disposition   General information question, no triage required and triager able to answer question    Protocols used:  Information Only Call - No Triage-ADULT-

## 2023-06-27 ENCOUNTER — APPOINTMENT (RX ONLY)
Dept: URBAN - METROPOLITAN AREA CLINIC 329 | Facility: CLINIC | Age: 73
Setting detail: DERMATOLOGY
End: 2023-06-27

## 2023-06-27 DIAGNOSIS — L81.4 OTHER MELANIN HYPERPIGMENTATION: ICD-10-CM

## 2023-06-27 DIAGNOSIS — L57.0 ACTINIC KERATOSIS: ICD-10-CM | Status: INADEQUATELY CONTROLLED

## 2023-06-27 DIAGNOSIS — L82.1 OTHER SEBORRHEIC KERATOSIS: ICD-10-CM

## 2023-06-27 DIAGNOSIS — D22 MELANOCYTIC NEVI: ICD-10-CM

## 2023-06-27 DIAGNOSIS — Z85.828 PERSONAL HISTORY OF OTHER MALIGNANT NEOPLASM OF SKIN: ICD-10-CM

## 2023-06-27 PROBLEM — D22.72 MELANOCYTIC NEVI OF LEFT LOWER LIMB, INCLUDING HIP: Status: ACTIVE | Noted: 2023-06-27

## 2023-06-27 PROBLEM — D22.5 MELANOCYTIC NEVI OF TRUNK: Status: ACTIVE | Noted: 2023-06-27

## 2023-06-27 PROBLEM — D22.62 MELANOCYTIC NEVI OF LEFT UPPER LIMB, INCLUDING SHOULDER: Status: ACTIVE | Noted: 2023-06-27

## 2023-06-27 PROCEDURE — ? LIQUID NITROGEN

## 2023-06-27 PROCEDURE — ? COUNSELING

## 2023-06-27 PROCEDURE — 17004 DESTROY PREMAL LESIONS 15/>: CPT

## 2023-06-27 PROCEDURE — ? TREATMENT REGIMEN

## 2023-06-27 PROCEDURE — ? ADDITIONAL NOTES

## 2023-06-27 PROCEDURE — 99213 OFFICE O/P EST LOW 20 MIN: CPT | Mod: 25

## 2023-06-27 PROCEDURE — ? FULL BODY SKIN EXAM

## 2023-06-27 ASSESSMENT — LOCATION DETAILED DESCRIPTION DERM
LOCATION DETAILED: RIGHT RADIAL DORSAL HAND
LOCATION DETAILED: LEFT ANTERIOR LATERAL DISTAL THIGH
LOCATION DETAILED: LEFT FOREHEAD
LOCATION DETAILED: LEFT SUPERIOR UPPER BACK
LOCATION DETAILED: LEFT ANTERIOR MEDIAL DISTAL THIGH
LOCATION DETAILED: RIGHT DISTAL CALF
LOCATION DETAILED: LEFT PROXIMAL POSTERIOR UPPER ARM
LOCATION DETAILED: RIGHT PROXIMAL MEDIAL CALF
LOCATION DETAILED: LEFT MEDIAL UPPER BACK
LOCATION DETAILED: RIGHT ANTERIOR PROXIMAL THIGH
LOCATION DETAILED: LEFT DISTAL CALF
LOCATION DETAILED: RIGHT ULNAR DORSAL HAND
LOCATION DETAILED: RIGHT DISTAL PRETIBIAL REGION
LOCATION DETAILED: LEFT PROXIMAL PRETIBIAL REGION
LOCATION DETAILED: RIGHT PROXIMAL PRETIBIAL REGION
LOCATION DETAILED: RIGHT MEDIAL MALAR CHEEK
LOCATION DETAILED: RIGHT RIB CAGE
LOCATION DETAILED: LEFT DISTAL DORSAL FOREARM
LOCATION DETAILED: NASAL DORSUM
LOCATION DETAILED: RIGHT ANTERIOR DISTAL THIGH
LOCATION DETAILED: RIGHT CENTRAL MALAR CHEEK
LOCATION DETAILED: RIGHT PROXIMAL DORSAL FOREARM
LOCATION DETAILED: LEFT ANTERIOR DISTAL THIGH

## 2023-06-27 ASSESSMENT — LOCATION SIMPLE DESCRIPTION DERM
LOCATION SIMPLE: LEFT PRETIBIAL REGION
LOCATION SIMPLE: NOSE
LOCATION SIMPLE: ABDOMEN
LOCATION SIMPLE: LEFT CALF
LOCATION SIMPLE: RIGHT THIGH
LOCATION SIMPLE: RIGHT HAND
LOCATION SIMPLE: LEFT THIGH
LOCATION SIMPLE: LEFT FOREHEAD
LOCATION SIMPLE: LEFT FOREARM
LOCATION SIMPLE: RIGHT CALF
LOCATION SIMPLE: RIGHT PRETIBIAL REGION
LOCATION SIMPLE: LEFT UPPER BACK
LOCATION SIMPLE: LEFT POSTERIOR UPPER ARM
LOCATION SIMPLE: RIGHT FOREARM
LOCATION SIMPLE: RIGHT CHEEK

## 2023-06-27 ASSESSMENT — LOCATION ZONE DERM
LOCATION ZONE: TRUNK
LOCATION ZONE: FACE
LOCATION ZONE: NOSE
LOCATION ZONE: HAND
LOCATION ZONE: ARM
LOCATION ZONE: LEG

## 2023-06-27 NOTE — PROCEDURE: LIQUID NITROGEN
Number Of Freeze-Thaw Cycles: 2 freeze-thaw cycles
Render Post-Care Instructions In Note?: no
Detail Level: Detailed
Duration Of Freeze Thaw-Cycle (Seconds): 0
Post-Care Instructions: I reviewed with the patient in detail post-care instructions. Patient is to wear sunprotection, and avoid picking at any of the treated lesions. Pt may apply Vaseline to crusted or scabbing areas.
Show Applicator Variable?: Yes
Consent: The patient's consent was obtained including but not limited to risks of crusting, scabbing, blistering, scarring, darker or lighter pigmentary change, recurrence, incomplete removal and infection.

## 2024-03-04 ENCOUNTER — TELEPHONE (OUTPATIENT)
Dept: INTERNAL MEDICINE CLINIC | Facility: CLINIC | Age: 74
End: 2024-03-04

## 2024-03-31 SDOH — ECONOMIC STABILITY: FOOD INSECURITY: WITHIN THE PAST 12 MONTHS, YOU WORRIED THAT YOUR FOOD WOULD RUN OUT BEFORE YOU GOT MONEY TO BUY MORE.: NEVER TRUE

## 2024-03-31 SDOH — ECONOMIC STABILITY: HOUSING INSECURITY
IN THE LAST 12 MONTHS, WAS THERE A TIME WHEN YOU DID NOT HAVE A STEADY PLACE TO SLEEP OR SLEPT IN A SHELTER (INCLUDING NOW)?: NO

## 2024-03-31 SDOH — ECONOMIC STABILITY: FOOD INSECURITY: WITHIN THE PAST 12 MONTHS, THE FOOD YOU BOUGHT JUST DIDN'T LAST AND YOU DIDN'T HAVE MONEY TO GET MORE.: NEVER TRUE

## 2024-03-31 SDOH — ECONOMIC STABILITY: INCOME INSECURITY: HOW HARD IS IT FOR YOU TO PAY FOR THE VERY BASICS LIKE FOOD, HOUSING, MEDICAL CARE, AND HEATING?: NOT HARD AT ALL

## 2024-03-31 SDOH — ECONOMIC STABILITY: TRANSPORTATION INSECURITY
IN THE PAST 12 MONTHS, HAS LACK OF TRANSPORTATION KEPT YOU FROM MEETINGS, WORK, OR FROM GETTING THINGS NEEDED FOR DAILY LIVING?: NO

## 2024-03-31 ASSESSMENT — PATIENT HEALTH QUESTIONNAIRE - PHQ9
1. LITTLE INTEREST OR PLEASURE IN DOING THINGS: NOT AT ALL
2. FEELING DOWN, DEPRESSED OR HOPELESS: NOT AT ALL
SUM OF ALL RESPONSES TO PHQ QUESTIONS 1-9: 0
2. FEELING DOWN, DEPRESSED OR HOPELESS: NOT AT ALL
1. LITTLE INTEREST OR PLEASURE IN DOING THINGS: NOT AT ALL
SUM OF ALL RESPONSES TO PHQ QUESTIONS 1-9: 0
SUM OF ALL RESPONSES TO PHQ9 QUESTIONS 1 & 2: 0
SUM OF ALL RESPONSES TO PHQ9 QUESTIONS 1 & 2: 0

## 2024-04-01 ENCOUNTER — OFFICE VISIT (OUTPATIENT)
Dept: INTERNAL MEDICINE CLINIC | Facility: CLINIC | Age: 74
End: 2024-04-01
Payer: MEDICARE

## 2024-04-01 VITALS
OXYGEN SATURATION: 100 % | DIASTOLIC BLOOD PRESSURE: 74 MMHG | BODY MASS INDEX: 17.58 KG/M2 | WEIGHT: 109.4 LBS | RESPIRATION RATE: 18 BRPM | HEART RATE: 66 BPM | TEMPERATURE: 97.8 F | SYSTOLIC BLOOD PRESSURE: 151 MMHG | HEIGHT: 66 IN

## 2024-04-01 DIAGNOSIS — E78.5 HYPERLIPIDEMIA, UNSPECIFIED HYPERLIPIDEMIA TYPE: ICD-10-CM

## 2024-04-01 DIAGNOSIS — E03.9 HYPOTHYROIDISM, UNSPECIFIED TYPE: ICD-10-CM

## 2024-04-01 DIAGNOSIS — R03.0 ELEVATED BLOOD PRESSURE READING IN OFFICE WITH WHITE COAT SYNDROME, WITHOUT DIAGNOSIS OF HYPERTENSION: ICD-10-CM

## 2024-04-01 DIAGNOSIS — Z76.89 ENCOUNTER TO ESTABLISH CARE: Primary | ICD-10-CM

## 2024-04-01 DIAGNOSIS — M85.80 OSTEOPENIA, UNSPECIFIED LOCATION: ICD-10-CM

## 2024-04-01 LAB
ANION GAP SERPL CALC-SCNC: 5 MMOL/L (ref 2–11)
BUN SERPL-MCNC: 15 MG/DL (ref 8–23)
CALCIUM SERPL-MCNC: 10.3 MG/DL (ref 8.3–10.4)
CHLORIDE SERPL-SCNC: 109 MMOL/L (ref 103–113)
CO2 SERPL-SCNC: 29 MMOL/L (ref 21–32)
CREAT SERPL-MCNC: 1 MG/DL (ref 0.6–1)
GLUCOSE SERPL-MCNC: 100 MG/DL (ref 65–100)
POTASSIUM SERPL-SCNC: 3.9 MMOL/L (ref 3.5–5.1)
SODIUM SERPL-SCNC: 143 MMOL/L (ref 136–146)
T4 FREE SERPL-MCNC: 1 NG/DL (ref 0.78–1.46)
TSH W FREE THYROID IF ABNORMAL: 6.94 UIU/ML (ref 0.36–3.74)

## 2024-04-01 PROCEDURE — 1123F ACP DISCUSS/DSCN MKR DOCD: CPT | Performed by: NURSE PRACTITIONER

## 2024-04-01 PROCEDURE — G8427 DOCREV CUR MEDS BY ELIG CLIN: HCPCS | Performed by: NURSE PRACTITIONER

## 2024-04-01 PROCEDURE — 99204 OFFICE O/P NEW MOD 45 MIN: CPT | Performed by: NURSE PRACTITIONER

## 2024-04-01 PROCEDURE — G8400 PT W/DXA NO RESULTS DOC: HCPCS | Performed by: NURSE PRACTITIONER

## 2024-04-01 PROCEDURE — 1090F PRES/ABSN URINE INCON ASSESS: CPT | Performed by: NURSE PRACTITIONER

## 2024-04-01 PROCEDURE — G8419 CALC BMI OUT NRM PARAM NOF/U: HCPCS | Performed by: NURSE PRACTITIONER

## 2024-04-01 PROCEDURE — 3017F COLORECTAL CA SCREEN DOC REV: CPT | Performed by: NURSE PRACTITIONER

## 2024-04-01 PROCEDURE — 1036F TOBACCO NON-USER: CPT | Performed by: NURSE PRACTITIONER

## 2024-04-01 RX ORDER — LEVOTHYROXINE SODIUM 0.07 MG/1
75 TABLET ORAL
COMMUNITY

## 2024-04-01 RX ORDER — ASPIRIN 81 MG/1
81 TABLET ORAL EVERY OTHER DAY
COMMUNITY

## 2024-04-01 RX ORDER — LEVOTHYROXINE SODIUM 0.05 MG/1
50 TABLET ORAL
COMMUNITY

## 2024-04-01 RX ORDER — VITAMIN B COMPLEX
CAPSULE ORAL DAILY
COMMUNITY
Start: 2022-01-01

## 2024-04-01 SDOH — ECONOMIC STABILITY: FOOD INSECURITY: WITHIN THE PAST 12 MONTHS, THE FOOD YOU BOUGHT JUST DIDN'T LAST AND YOU DIDN'T HAVE MONEY TO GET MORE.: NEVER TRUE

## 2024-04-01 SDOH — ECONOMIC STABILITY: INCOME INSECURITY: HOW HARD IS IT FOR YOU TO PAY FOR THE VERY BASICS LIKE FOOD, HOUSING, MEDICAL CARE, AND HEATING?: NOT HARD AT ALL

## 2024-04-01 SDOH — ECONOMIC STABILITY: FOOD INSECURITY: WITHIN THE PAST 12 MONTHS, YOU WORRIED THAT YOUR FOOD WOULD RUN OUT BEFORE YOU GOT MONEY TO BUY MORE.: NEVER TRUE

## 2024-04-01 ASSESSMENT — ENCOUNTER SYMPTOMS
ABDOMINAL PAIN: 0
PHOTOPHOBIA: 0

## 2024-04-01 NOTE — PROGRESS NOTES
Systems   Constitutional:  Negative for chills and fever.   HENT:  Positive for tinnitus.    Eyes:  Positive for visual disturbance (seeing floaters, has appt with opthalmologist). Negative for photophobia.   Respiratory:  Negative for shortness of breath.    Cardiovascular:  Negative for chest pain.   Gastrointestinal:  Negative for abdominal pain.   Neurological:  Negative for facial asymmetry, speech difficulty and headaches.   Psychiatric/Behavioral:  Negative for sleep disturbance. The patient is nervous/anxious.    All other systems reviewed and are negative.      BP (!) 151/74 (Site: Left Upper Arm, Position: Sitting, Cuff Size: Medium Adult)   Pulse 66   Temp 97.8 °F (36.6 °C) (Temporal)   Resp 18   Ht 1.664 m (5' 5.5\")   Wt 49.6 kg (109 lb 6.4 oz)   SpO2 100% Comment: MELONIE  BMI 17.93 kg/m²       Physical Exam    Physical Exam  Vitals and nursing note reviewed.   Constitutional:       General: She is not in acute distress.     Appearance: She is not ill-appearing, toxic-appearing or diaphoretic.   HENT:      Right Ear: Tympanic membrane normal.      Left Ear: Tympanic membrane normal.      Ears:      Hammond exam findings: Does not lateralize.     Right Rinne: AC > BC.     Left Rinne: AC > BC.     Mouth/Throat:      Mouth: Mucous membranes are moist.      Pharynx: Oropharynx is clear.   Eyes:      General: No visual field deficit.     Extraocular Movements: Extraocular movements intact.      Pupils: Pupils are equal, round, and reactive to light.      Visual Fields: Right eye visual fields normal and left eye visual fields normal.   Cardiovascular:      Rate and Rhythm: Regular rhythm.   Pulmonary:      Effort: No respiratory distress.      Breath sounds: No wheezing, rhonchi or rales.   Musculoskeletal:      Right lower leg: No edema.      Left lower leg: No edema.   Neurological:      Mental Status: She is oriented to person, place, and time.      Cranial Nerves: No cranial nerve deficit, dysarthria or

## 2024-04-02 ENCOUNTER — TELEPHONE (OUTPATIENT)
Dept: INTERNAL MEDICINE CLINIC | Facility: CLINIC | Age: 74
End: 2024-04-02

## 2024-04-02 DIAGNOSIS — E03.9 HYPOTHYROIDISM, UNSPECIFIED TYPE: Primary | ICD-10-CM

## 2024-04-02 NOTE — TELEPHONE ENCOUNTER
Needs 3 month lab, tsh thanks!  My chart message sent regarding her labs. Please make sure she know how to read. Thanks!  Priscilla

## 2024-04-04 ENCOUNTER — HOSPITAL ENCOUNTER (OUTPATIENT)
Dept: MAMMOGRAPHY | Age: 74
Discharge: HOME | End: 2024-04-04
Payer: MEDICARE

## 2024-04-04 DIAGNOSIS — M85.80 OSTEOPENIA, UNSPECIFIED LOCATION: ICD-10-CM

## 2024-04-04 PROCEDURE — 77080 DXA BONE DENSITY AXIAL: CPT

## 2024-04-09 ENCOUNTER — TELEPHONE (OUTPATIENT)
Dept: INTERNAL MEDICINE CLINIC | Facility: CLINIC | Age: 74
End: 2024-04-09

## 2024-04-09 NOTE — TELEPHONE ENCOUNTER
I sent the message below via my chart, patient has not read the following message, please relay. Thanks!      Good morning, Mrs. Farmer!     The bone scan shows that you have osteopenia but not osteoporosis.   The recommendations are for you to get plenty of weight bearing exercise and to take  1200 mg of elemental calcium daily, total diet plus supplement, and at least 800-2000 IU of international units of vitamin D daily are advised.       The FRAX score, which looks at your fracture risk assessment, gives an estimation for a fracture within the next 10 years and  shows the  hip fracture on the higher side and so we may go ahead and consider therapy for you.   The two medications I would recommend are Alendronate or Prolia. I have provided some information about these medications below. We can discuss at your follow up whether you would like to begin one of these medications.      Hope you are doing well!  Priscilla

## 2024-04-29 ENCOUNTER — OFFICE VISIT (OUTPATIENT)
Dept: INTERNAL MEDICINE CLINIC | Facility: CLINIC | Age: 74
End: 2024-04-29
Payer: MEDICARE

## 2024-04-29 VITALS
OXYGEN SATURATION: 99 % | SYSTOLIC BLOOD PRESSURE: 161 MMHG | RESPIRATION RATE: 18 BRPM | TEMPERATURE: 97.4 F | HEIGHT: 66 IN | DIASTOLIC BLOOD PRESSURE: 65 MMHG | HEART RATE: 60 BPM | BODY MASS INDEX: 17.64 KG/M2 | WEIGHT: 109.8 LBS

## 2024-04-29 DIAGNOSIS — E55.9 VITAMIN D DEFICIENCY: ICD-10-CM

## 2024-04-29 DIAGNOSIS — M85.80 OSTEOPENIA, UNSPECIFIED LOCATION: ICD-10-CM

## 2024-04-29 DIAGNOSIS — H53.9 TRANSIENT VISUAL DISTURBANCE: Primary | ICD-10-CM

## 2024-04-29 DIAGNOSIS — R03.0 ELEVATED BLOOD PRESSURE READING IN OFFICE WITHOUT DIAGNOSIS OF HYPERTENSION: ICD-10-CM

## 2024-04-29 PROCEDURE — 1123F ACP DISCUSS/DSCN MKR DOCD: CPT | Performed by: NURSE PRACTITIONER

## 2024-04-29 PROCEDURE — 1090F PRES/ABSN URINE INCON ASSESS: CPT | Performed by: NURSE PRACTITIONER

## 2024-04-29 PROCEDURE — 99214 OFFICE O/P EST MOD 30 MIN: CPT | Performed by: NURSE PRACTITIONER

## 2024-04-29 PROCEDURE — 3017F COLORECTAL CA SCREEN DOC REV: CPT | Performed by: NURSE PRACTITIONER

## 2024-04-29 PROCEDURE — G8419 CALC BMI OUT NRM PARAM NOF/U: HCPCS | Performed by: NURSE PRACTITIONER

## 2024-04-29 PROCEDURE — G8427 DOCREV CUR MEDS BY ELIG CLIN: HCPCS | Performed by: NURSE PRACTITIONER

## 2024-04-29 PROCEDURE — 1036F TOBACCO NON-USER: CPT | Performed by: NURSE PRACTITIONER

## 2024-04-29 PROCEDURE — G8399 PT W/DXA RESULTS DOCUMENT: HCPCS | Performed by: NURSE PRACTITIONER

## 2024-04-29 ASSESSMENT — ENCOUNTER SYMPTOMS: BACK PAIN: 0

## 2024-04-29 NOTE — PROGRESS NOTES
4/7 125/55  68  4/8 150/76  69  4/9 156/76  63 146/74  63  4/10 130/72  62 124/66  61  4/13 119/71  67  4/18 142/69  72  4/19 115/59  63  4/24 121/57  60  4/25 150/70  64  4/28 129/70  66

## 2024-04-29 NOTE — PROGRESS NOTES
4/29/2024 10:18 AM  Location:[facility] PHYSICIAN SERVICES  [facility] INTERNAL MEDICINE  [place]  Patient #:  [id number]  YOB: 1950          YOUR LAST HEMOGLOBIN A1CS:   No results found for: \"HBA1C\", \"KCZ4ROEQ\"    YOUR LAST LIPID PROFILE: No results found for: \"CHOL\", \"CHOLPOCT\", \"CHOLX\", \"CHLST\", \"CHOLV\", \"HDL\", \"HDLPOC\", \"HDLC\", \"LDL\", \"LDLC\", \"VLDLC\", \"VLDL\", \"TGLX\", \"TRIGL\"      Lab Results   Component Value Date/Time    GFRAA >60 10/12/2021 12:14 PM    BUN 15 04/01/2024 10:14 AM     04/01/2024 10:14 AM    K 3.9 04/01/2024 10:14 AM     04/01/2024 10:14 AM    CO2 29 04/01/2024 10:14 AM           History of Present Illness     Chief Complaint   Patient presents with    Follow-up     Patient presents to office for 4 week follow-up    Discuss Medications     What dose of calcium would she need to start on       Ms. Farmer is a 73 y.o. female  who presents for follow up.  Ms. Farmer presents for discussion of bone density studies. Has not tolerated Fosamax in the past.   Has hx of traumatic hip fracture. Has known about osteopenia for several years. Would like to know about supplementation.    Is going to her ophthalmologist for a \"black spot\" that she has developed in her left eye since Easter. Denies other stroke sx.     Worries about BP, brings a list of readings, mostly 115-140 range.            Allergies   Allergen Reactions    Lisinopril Other (See Comments)     Dizzy     Estradiol Other (See Comments)     Didn't work      Past Medical History:   Diagnosis Date    Arthritis     Fractured hip (HCC) 11/2020    right    Fractured shoulder 11/2020    right    Hearing loss     Hyperlipidemia     Hypothyroidism     Osteoarthritis     Sleep apnea     C-pap nightly      Social History     Socioeconomic History    Marital status:      Spouse name: None    Number of children: None    Years of education: None    Highest education level: None   Tobacco Use    Smoking status: Former

## 2024-05-21 ENCOUNTER — HOSPITAL ENCOUNTER (OUTPATIENT)
Dept: MRI IMAGING | Age: 74
Discharge: HOME | End: 2024-05-24
Payer: MEDICARE

## 2024-05-21 ENCOUNTER — HOSPITAL ENCOUNTER (OUTPATIENT)
Dept: ULTRASOUND IMAGING | Age: 74
Discharge: HOME | End: 2024-05-24
Payer: MEDICARE

## 2024-05-21 DIAGNOSIS — H53.9 TRANSIENT VISUAL DISTURBANCE: ICD-10-CM

## 2024-05-21 PROCEDURE — 93880 EXTRACRANIAL BILAT STUDY: CPT

## 2024-05-21 PROCEDURE — 93880 EXTRACRANIAL BILAT STUDY: CPT | Performed by: RADIOLOGY

## 2024-05-21 PROCEDURE — 70551 MRI BRAIN STEM W/O DYE: CPT

## 2024-07-01 ENCOUNTER — NURSE ONLY (OUTPATIENT)
Dept: INTERNAL MEDICINE CLINIC | Facility: CLINIC | Age: 74
End: 2024-07-01

## 2024-07-01 DIAGNOSIS — E55.9 VITAMIN D DEFICIENCY: ICD-10-CM

## 2024-07-01 DIAGNOSIS — E03.9 HYPOTHYROIDISM, UNSPECIFIED TYPE: ICD-10-CM

## 2024-07-01 LAB
25(OH)D3 SERPL-MCNC: 104 NG/ML (ref 30–100)
T4 FREE SERPL-MCNC: 1.2 NG/DL (ref 0.9–1.7)
TSH W FREE THYROID IF ABNORMAL: 6.15 UIU/ML (ref 0.27–4.2)

## 2024-07-02 ENCOUNTER — TELEPHONE (OUTPATIENT)
Dept: INTERNAL MEDICINE CLINIC | Facility: CLINIC | Age: 74
End: 2024-07-02

## 2024-07-02 DIAGNOSIS — E03.9 HYPOTHYROIDISM, UNSPECIFIED TYPE: Primary | ICD-10-CM

## 2024-07-02 RX ORDER — LEVOTHYROXINE SODIUM 0.07 MG/1
75 TABLET ORAL DAILY
Qty: 90 TABLET | Refills: 1 | Status: SHIPPED | OUTPATIENT
Start: 2024-07-02

## 2024-07-15 ENCOUNTER — OFFICE VISIT (OUTPATIENT)
Dept: INTERNAL MEDICINE CLINIC | Facility: CLINIC | Age: 74
End: 2024-07-15
Payer: MEDICARE

## 2024-07-15 VITALS
RESPIRATION RATE: 18 BRPM | HEART RATE: 62 BPM | BODY MASS INDEX: 17.16 KG/M2 | WEIGHT: 106.8 LBS | OXYGEN SATURATION: 100 % | SYSTOLIC BLOOD PRESSURE: 174 MMHG | DIASTOLIC BLOOD PRESSURE: 83 MMHG | TEMPERATURE: 98.4 F | HEIGHT: 66 IN

## 2024-07-15 DIAGNOSIS — G47.33 OSA ON CPAP: ICD-10-CM

## 2024-07-15 DIAGNOSIS — R55 SYNCOPE, UNSPECIFIED SYNCOPE TYPE: Primary | ICD-10-CM

## 2024-07-15 DIAGNOSIS — R03.0 ELEVATED BLOOD PRESSURE READING IN OFFICE WITHOUT DIAGNOSIS OF HYPERTENSION: ICD-10-CM

## 2024-07-15 LAB
ALBUMIN SERPL-MCNC: 4.4 G/DL (ref 3.2–4.6)
ALBUMIN/GLOB SERPL: 1.5 (ref 1–1.9)
ALP SERPL-CCNC: 126 U/L (ref 35–104)
ALT SERPL-CCNC: 34 U/L (ref 12–65)
ANION GAP SERPL CALC-SCNC: 10 MMOL/L (ref 9–18)
AST SERPL-CCNC: 49 U/L (ref 15–37)
BASOPHILS # BLD: 0.1 K/UL (ref 0–0.2)
BASOPHILS NFR BLD: 2 % (ref 0–2)
BILIRUB SERPL-MCNC: 0.7 MG/DL (ref 0–1.2)
BUN SERPL-MCNC: 12 MG/DL (ref 8–23)
CALCIUM SERPL-MCNC: 10 MG/DL (ref 8.8–10.2)
CHLORIDE SERPL-SCNC: 104 MMOL/L (ref 98–107)
CO2 SERPL-SCNC: 28 MMOL/L (ref 20–28)
CREAT SERPL-MCNC: 0.87 MG/DL (ref 0.6–1.1)
D DIMER PPP FEU-MCNC: 0.56 UG/ML(FEU)
DIFFERENTIAL METHOD BLD: ABNORMAL
EOSINOPHIL # BLD: 0.3 K/UL (ref 0–0.8)
EOSINOPHIL NFR BLD: 5 % (ref 0.5–7.8)
ERYTHROCYTE [DISTWIDTH] IN BLOOD BY AUTOMATED COUNT: 13.3 % (ref 11.9–14.6)
GLOBULIN SER CALC-MCNC: 2.8 G/DL (ref 2.3–3.5)
GLUCOSE SERPL-MCNC: 108 MG/DL (ref 70–99)
HCT VFR BLD AUTO: 39.5 % (ref 35.8–46.3)
HGB BLD-MCNC: 12.3 G/DL (ref 11.7–15.4)
IMM GRANULOCYTES # BLD AUTO: 0 K/UL (ref 0–0.5)
IMM GRANULOCYTES NFR BLD AUTO: 0 % (ref 0–5)
LYMPHOCYTES # BLD: 1.8 K/UL (ref 0.5–4.6)
LYMPHOCYTES NFR BLD: 33 % (ref 13–44)
MAGNESIUM SERPL-MCNC: 2 MG/DL (ref 1.8–2.4)
MCH RBC QN AUTO: 29.6 PG (ref 26.1–32.9)
MCHC RBC AUTO-ENTMCNC: 31.1 G/DL (ref 31.4–35)
MCV RBC AUTO: 95.2 FL (ref 82–102)
MONOCYTES # BLD: 0.5 K/UL (ref 0.1–1.3)
MONOCYTES NFR BLD: 10 % (ref 4–12)
NEUTS SEG # BLD: 2.7 K/UL (ref 1.7–8.2)
NEUTS SEG NFR BLD: 50 % (ref 43–78)
NRBC # BLD: 0 K/UL (ref 0–0.2)
PLATELET # BLD AUTO: 258 K/UL (ref 150–450)
PMV BLD AUTO: 10.8 FL (ref 9.4–12.3)
POTASSIUM SERPL-SCNC: 4.5 MMOL/L (ref 3.5–5.1)
PROT SERPL-MCNC: 7.2 G/DL (ref 6.3–8.2)
RBC # BLD AUTO: 4.15 M/UL (ref 4.05–5.2)
SODIUM SERPL-SCNC: 142 MMOL/L (ref 136–145)
WBC # BLD AUTO: 5.4 K/UL (ref 4.3–11.1)

## 2024-07-15 PROCEDURE — 1123F ACP DISCUSS/DSCN MKR DOCD: CPT | Performed by: NURSE PRACTITIONER

## 2024-07-15 PROCEDURE — 1090F PRES/ABSN URINE INCON ASSESS: CPT | Performed by: NURSE PRACTITIONER

## 2024-07-15 PROCEDURE — 3017F COLORECTAL CA SCREEN DOC REV: CPT | Performed by: NURSE PRACTITIONER

## 2024-07-15 PROCEDURE — 1036F TOBACCO NON-USER: CPT | Performed by: NURSE PRACTITIONER

## 2024-07-15 PROCEDURE — 93000 ELECTROCARDIOGRAM COMPLETE: CPT | Performed by: NURSE PRACTITIONER

## 2024-07-15 PROCEDURE — G8427 DOCREV CUR MEDS BY ELIG CLIN: HCPCS | Performed by: NURSE PRACTITIONER

## 2024-07-15 PROCEDURE — 99214 OFFICE O/P EST MOD 30 MIN: CPT | Performed by: NURSE PRACTITIONER

## 2024-07-15 PROCEDURE — G8399 PT W/DXA RESULTS DOCUMENT: HCPCS | Performed by: NURSE PRACTITIONER

## 2024-07-15 PROCEDURE — G8419 CALC BMI OUT NRM PARAM NOF/U: HCPCS | Performed by: NURSE PRACTITIONER

## 2024-07-15 RX ORDER — PHENOL 1.4 %
2 AEROSOL, SPRAY (ML) MUCOUS MEMBRANE DAILY
COMMUNITY

## 2024-07-15 ASSESSMENT — ENCOUNTER SYMPTOMS
BLOOD IN STOOL: 0
WHEEZING: 0
SHORTNESS OF BREATH: 0

## 2024-07-15 NOTE — PROGRESS NOTES
incontinence.   No hx of DVT or PE, no tisk factors of such. No stroke sx.   Has felt fine since.   Recently she had an MRI of the brain and carotid studies which were normal for some complaints of vision changes.  She has been following with her optometrist for that.          Loss of Consciousness  The current episode started in the past 7 days. Associated symptoms include dizziness (mild dizziness several days before). Pertinent negatives include no chest pain, fever or palpitations.          Allergies   Allergen Reactions    Lisinopril Other (See Comments)     Dizzy     Estradiol Other (See Comments)     Didn't work      Past Medical History:   Diagnosis Date    Arthritis     Fractured hip (HCC) 2020    right    Fractured shoulder 2020    right    Hearing loss     Hyperlipidemia     Hypothyroidism     Osteoarthritis     Sleep apnea     C-pap nightly      Social History     Socioeconomic History    Marital status:    Tobacco Use    Smoking status: Former     Current packs/day: 0.00     Average packs/day: 0.3 packs/day for 4.0 years (1.0 ttl pk-yrs)     Types: Cigarettes     Quit date: 7/10/1978     Years since quittin.0    Smokeless tobacco: Never    Tobacco comments:     limited   Substance and Sexual Activity    Alcohol use: Yes     Alcohol/week: 3.0 standard drinks of alcohol     Types: 3 Cans of beer per week    Drug use: Never    Sexual activity: Yes     Partners: Male     Social Determinants of Health     Financial Resource Strain: Low Risk  (2024)    Overall Financial Resource Strain (CARDIA)     Difficulty of Paying Living Expenses: Not hard at all   Food Insecurity: No Food Insecurity (2024)    Hunger Vital Sign     Worried About Running Out of Food in the Last Year: Never true     Ran Out of Food in the Last Year: Never true   Transportation Needs: Unknown (2024)    PRAPARE - Transportation     Lack of Transportation (Non-Medical): No   Housing Stability: Unknown (2024)

## 2024-07-16 ENCOUNTER — TELEPHONE (OUTPATIENT)
Dept: INTERNAL MEDICINE CLINIC | Facility: CLINIC | Age: 74
End: 2024-07-16

## 2024-07-16 DIAGNOSIS — R74.8 ELEVATED LIVER ENZYMES: Primary | ICD-10-CM

## 2024-07-16 DIAGNOSIS — R55 SYNCOPE, UNSPECIFIED SYNCOPE TYPE: Primary | ICD-10-CM

## 2024-07-16 NOTE — TELEPHONE ENCOUNTER
I have talked with Ms. Farmer and she has the ultrasound scheduled for tomorrow at one at [facility].   She hasn't heard from the cardiologist office yet.  And I have her appointment scheduled on October 2. 24.

## 2024-07-16 NOTE — TELEPHONE ENCOUNTER
My chart message sent regarding labs. Needs OV in October, preferably around the time she is scheduled for her lab work is fine with me.   Thanks!  Priscilla    My chart message~  Good morning, Mrs. Farmer!    The labs that we checked yesterday show that the d-dimer is negative for your age, so we do not need to get a CT of your chest.   Two of the liver enzymes are just a bit elevated.   I would like to just get an ultrasound of your liver to investigate this.   (I have ordered through [facility].If you do not hear from them to schedule you in the next few days, please call [phone] to schedule your testing. I will be in touch with you after I receive test results.)    I am glad you will be seeing the cardiologist for the episode of syncope. Let me know if you have any new or concerning symptoms, or if you have difficulty scheduling your visit with the cardiologist.    I know you have labs in October and I would like to have an office visit at that time as well.    Here if you need anything!  Priscilla

## 2024-07-17 ENCOUNTER — HOSPITAL ENCOUNTER (OUTPATIENT)
Dept: ULTRASOUND IMAGING | Age: 74
Discharge: HOME | End: 2024-07-20
Payer: MEDICARE

## 2024-07-17 ENCOUNTER — APPOINTMENT (RX ONLY)
Dept: URBAN - METROPOLITAN AREA CLINIC 329 | Facility: CLINIC | Age: 74
Setting detail: DERMATOLOGY
End: 2024-07-17

## 2024-07-17 DIAGNOSIS — L81.4 OTHER MELANIN HYPERPIGMENTATION: ICD-10-CM

## 2024-07-17 DIAGNOSIS — L57.0 ACTINIC KERATOSIS: ICD-10-CM

## 2024-07-17 DIAGNOSIS — R74.8 ELEVATED LIVER ENZYMES: ICD-10-CM

## 2024-07-17 DIAGNOSIS — D17 BENIGN LIPOMATOUS NEOPLASM: ICD-10-CM

## 2024-07-17 DIAGNOSIS — D22 MELANOCYTIC NEVI: ICD-10-CM

## 2024-07-17 DIAGNOSIS — D485 NEOPLASM OF UNCERTAIN BEHAVIOR OF SKIN: ICD-10-CM

## 2024-07-17 DIAGNOSIS — L82.1 OTHER SEBORRHEIC KERATOSIS: ICD-10-CM

## 2024-07-17 PROBLEM — D17.21 BENIGN LIPOMATOUS NEOPLASM OF SKIN AND SUBCUTANEOUS TISSUE OF RIGHT ARM: Status: ACTIVE | Noted: 2024-07-17

## 2024-07-17 PROBLEM — D48.5 NEOPLASM OF UNCERTAIN BEHAVIOR OF SKIN: Status: ACTIVE | Noted: 2024-07-17

## 2024-07-17 PROBLEM — D22.5 MELANOCYTIC NEVI OF TRUNK: Status: ACTIVE | Noted: 2024-07-17

## 2024-07-17 PROCEDURE — 17003 DESTRUCT PREMALG LES 2-14: CPT

## 2024-07-17 PROCEDURE — ? ADDITIONAL NOTES

## 2024-07-17 PROCEDURE — ? PRESCRIPTION MEDICATION MANAGEMENT

## 2024-07-17 PROCEDURE — ? PRESCRIPTION

## 2024-07-17 PROCEDURE — ? DEFER

## 2024-07-17 PROCEDURE — 76705 ECHO EXAM OF ABDOMEN: CPT

## 2024-07-17 PROCEDURE — ? MEDICATION COUNSELING

## 2024-07-17 PROCEDURE — ? PHOTO-DOCUMENTATION

## 2024-07-17 PROCEDURE — 99214 OFFICE O/P EST MOD 30 MIN: CPT | Mod: 25

## 2024-07-17 PROCEDURE — ? LIQUID NITROGEN

## 2024-07-17 PROCEDURE — ? OBSERVATION

## 2024-07-17 PROCEDURE — 17000 DESTRUCT PREMALG LESION: CPT

## 2024-07-17 PROCEDURE — ? FULL BODY SKIN EXAM

## 2024-07-17 PROCEDURE — ? COUNSELING

## 2024-07-17 PROCEDURE — ? TREATMENT REGIMEN

## 2024-07-17 RX ORDER — FLUOROURACIL 0.04 G/G
CREAM TOPICAL
Qty: 40 | Refills: 2 | Status: ERX | COMMUNITY
Start: 2024-07-17

## 2024-07-17 RX ADMIN — FLUOROURACIL: 0.04 CREAM TOPICAL at 00:00

## 2024-07-17 ASSESSMENT — LOCATION DETAILED DESCRIPTION DERM
LOCATION DETAILED: RIGHT LATERAL DISTAL UPPER ARM
LOCATION DETAILED: INFERIOR MID FOREHEAD
LOCATION DETAILED: LEFT PROXIMAL DORSAL FOREARM
LOCATION DETAILED: LEFT PROXIMAL PRETIBIAL REGION
LOCATION DETAILED: LEFT DISTAL DORSAL FOREARM
LOCATION DETAILED: LEFT DISTAL PRETIBIAL REGION
LOCATION DETAILED: RIGHT ANTERIOR DISTAL THIGH
LOCATION DETAILED: LEFT DISTAL CALF
LOCATION DETAILED: LEFT DISTAL POSTERIOR THIGH
LOCATION DETAILED: RIGHT SUPERIOR CENTRAL BUCCAL CHEEK
LOCATION DETAILED: LEFT MEDIAL UPPER BACK
LOCATION DETAILED: LEFT SUPERIOR UPPER BACK
LOCATION DETAILED: RIGHT SUPERIOR CENTRAL MALAR CHEEK

## 2024-07-17 ASSESSMENT — LOCATION SIMPLE DESCRIPTION DERM
LOCATION SIMPLE: LEFT POSTERIOR THIGH
LOCATION SIMPLE: RIGHT UPPER ARM
LOCATION SIMPLE: RIGHT THIGH
LOCATION SIMPLE: INFERIOR FOREHEAD
LOCATION SIMPLE: LEFT CALF
LOCATION SIMPLE: RIGHT CHEEK
LOCATION SIMPLE: LEFT PRETIBIAL REGION
LOCATION SIMPLE: LEFT FOREARM
LOCATION SIMPLE: LEFT UPPER BACK

## 2024-07-17 ASSESSMENT — LOCATION ZONE DERM
LOCATION ZONE: ARM
LOCATION ZONE: LEG
LOCATION ZONE: TRUNK
LOCATION ZONE: FACE

## 2024-07-17 NOTE — PROCEDURE: PRESCRIPTION MEDICATION MANAGEMENT
Plan: Patient will start treatment to the forehead and cheeks in the fall.  Understands to start with forehead to see how she tolerates treatment.  Understands if treatment becomes intolerable it is ok to stop for a few days or stop treat earlier between 2-4 weeks
Render In Strict Bullet Format?: No
Detail Level: Zone
Initiate Treatment: Tolak 4 % topical cream-Apply to face once daily for 4 weeks

## 2024-07-17 NOTE — PROCEDURE: ADDITIONAL NOTES
Additional Notes: Discussed treating with LN2 v/s Tolak.  We will treat hypertrophic lesions with LN2 today patient elects to treat other lesions with Tolak as stated elsewhere in note.
Render Risk Assessment In Note?: no
Detail Level: Simple

## 2024-07-17 NOTE — HPI: SKIN LESION
How Severe Is Your Skin Lesion?: mild
Is This A New Presentation, Or A Follow-Up?: Skin Lesions
Additional History: Patient’s last FBE was June 27, 2023 with VIDA Ricardo

## 2024-07-17 NOTE — PROCEDURE: MEDICATION COUNSELING
Topical Steroids Applications Pregnancy And Lactation Text: Most topical steroids are considered safe to use during pregnancy and lactation.  Any topical steroid applied to the breast or nipple should be washed off before breastfeeding.
Olumiant Pregnancy And Lactation Text: Based on animal studies, Olumiant may cause embryo-fetal harm when administered to pregnant women.  The medication should not be used in pregnancy.  Breastfeeding is not recommended during treatment.
Otezla Counseling: The side effects of Otezla were discussed with the patient, including but not limited to worsening or new depression, weight loss, diarrhea, nausea, upper respiratory tract infection, and headache. Patient instructed to call the office should any adverse effect occur.  The patient verbalized understanding of the proper use and possible adverse effects of Otezla.  All the patient's questions and concerns were addressed.
Mirvaso Counseling: Mirvaso is a topical medication which can decrease superficial blood flow where applied. Side effects are uncommon and include stinging, redness and allergic reactions.
Tazorac Counseling:  Patient advised that medication is irritating and drying.  Patient may need to apply sparingly and wash off after an hour before eventually leaving it on overnight.  The patient verbalized understanding of the proper use and possible adverse effects of tazorac.  All of the patient's questions and concerns were addressed.
Quinolones Counseling:  I discussed with the patient the risks of fluoroquinolones including but not limited to GI upset, allergic reaction, drug rash, diarrhea, dizziness, photosensitivity, yeast infections, liver function test abnormalities, tendonitis/tendon rupture.
Eucrisa Pregnancy And Lactation Text: This medication has not been assigned a Pregnancy Risk Category but animal studies failed to show danger with the topical medication. It is unknown if the medication is excreted in breast milk.
Tremfya Pregnancy And Lactation Text: The risk during pregnancy and breastfeeding is uncertain with this medication.
Xolair Pregnancy And Lactation Text: This medication is Pregnancy Category B and is considered safe during pregnancy. This medication is excreted in breast milk.
Dutasteride Female Counseling: Dutasteride Counseling:  I discussed with the patient the risks of use of dutasteride including but not limited to decreased libido and sexual dysfunction. Explained the teratogenic nature of the medication and stressed the importance of not getting pregnant during treatment. All of the patient's questions and concerns were addressed.
Qbrexza Pregnancy And Lactation Text: There is no available data on Qbrexza use in pregnant women.  There is no available data on Qbrexza use in lactation.
Cosentyx Counseling:  I discussed with the patient the risks of Cosentyx including but not limited to worsening of Crohn's disease, immunosuppression, allergic reactions and infections.  The patient understands that monitoring is required including a PPD at baseline and must alert us or the primary physician if symptoms of infection or other concerning signs are noted.
Colchicine Counseling:  Patient counseled regarding adverse effects including but not limited to stomach upset (nausea, vomiting, stomach pain, or diarrhea).  Patient instructed to limit alcohol consumption while taking this medication.  Colchicine may reduce blood counts especially with prolonged use.  The patient understands that monitoring of kidney function and blood counts may be required, especially at baseline. The patient verbalized understanding of the proper use and possible adverse effects of colchicine.  All of the patient's questions and concerns were addressed.
Hyrimoz Pregnancy And Lactation Text: This medication is Pregnancy Category B and is considered safe during pregnancy. It is unknown if this medication is excreted in breast milk.
Zoryve Counseling:  I discussed with the patient that Zoryve is not for use in the eyes, mouth or vagina. The most commonly reported side effects include diarrhea, headache, insomnia, application site pain, upper respiratory tract infections, and urinary tract infections.  All of the patient's questions and concerns were addressed.
Thalidomide Pregnancy And Lactation Text: This medication is Pregnancy Category X and is absolutely contraindicated during pregnancy. It is unknown if it is excreted in breast milk.
Simponi Counseling:  I discussed with the patient the risks of golimumab including but not limited to myelosuppression, immunosuppression, autoimmune hepatitis, demyelinating diseases, lymphoma, and serious infections.  The patient understands that monitoring is required including a PPD at baseline and must alert us or the primary physician if symptoms of infection or other concerning signs are noted.
Fluconazole Pregnancy And Lactation Text: This medication is Pregnancy Category C and it isn't know if it is safe during pregnancy. It is also excreted in breast milk.
Ivermectin Counseling:  Patient instructed to take medication on an empty stomach with a full glass of water.  Patient informed of potential adverse effects including but not limited to nausea, diarrhea, dizziness, itching, and swelling of the extremities or lymph nodes.  The patient verbalized understanding of the proper use and possible adverse effects of ivermectin.  All of the patient's questions and concerns were addressed.
Erythromycin Counseling:  I discussed with the patient the risks of erythromycin including but not limited to GI upset, allergic reaction, drug rash, diarrhea, increase in liver enzymes, and yeast infections.
Azathioprine Counseling:  I discussed with the patient the risks of azathioprine including but not limited to myelosuppression, immunosuppression, hepatotoxicity, lymphoma, and infections.  The patient understands that monitoring is required including baseline LFTs, Creatinine, possible TPMP genotyping and weekly CBCs for the first month and then every 2 weeks thereafter.  The patient verbalized understanding of the proper use and possible adverse effects of azathioprine.  All of the patient's questions and concerns were addressed.
Azelaic Acid Counseling: Patient counseled that medicine may cause skin irritation and to avoid applying near the eyes.  In the event of skin irritation, the patient was advised to reduce the amount of the drug applied or use it less frequently.   The patient verbalized understanding of the proper use and possible adverse effects of azelaic acid.  All of the patient's questions and concerns were addressed.
Azithromycin Pregnancy And Lactation Text: This medication is considered safe during pregnancy and is also secreted in breast milk.
Bexarotene Counseling:  I discussed with the patient the risks of bexarotene including but not limited to hair loss, dry lips/skin/eyes, liver abnormalities, hyperlipidemia, pancreatitis, depression/suicidal ideation, photosensitivity, drug rash/allergic reactions, hypothyroidism, anemia, leukopenia, infection, cataracts, and teratogenicity.  Patient understands that they will need regular blood tests to check lipid profile, liver function tests, white blood cell count, thyroid function tests and pregnancy test if applicable.
Tazorac Pregnancy And Lactation Text: This medication is not safe during pregnancy. It is unknown if this medication is excreted in breast milk.
Topical Sulfur Applications Counseling: Topical Sulfur Counseling: Patient counseled that this medication may cause skin irritation or allergic reactions.  In the event of skin irritation, the patient was advised to reduce the amount of the drug applied or use it less frequently.   The patient verbalized understanding of the proper use and possible adverse effects of topical sulfur application.  All of the patient's questions and concerns were addressed.
Niacinamide Counseling: I recommended taking niacin or niacinamide, also know as vitamin B3, twice daily. Recent evidence suggests that taking vitamin B3 (500 mg twice daily) can reduce the risk of actinic keratoses and non-melanoma skin cancers. Side effects of vitamin B3 include flushing and headache.
Methotrexate Pregnancy And Lactation Text: This medication is Pregnancy Category X and is known to cause fetal harm. This medication is excreted in breast milk.
Dapsone Pregnancy And Lactation Text: This medication is Pregnancy Category C and is not considered safe during pregnancy or breast feeding.
Ilumya Counseling: I discussed with the patient the risks of tildrakizumab including but not limited to immunosuppression, malignancy, posterior leukoencephalopathy syndrome, and serious infections.  The patient understands that monitoring is required including a PPD at baseline and must alert us or the primary physician if symptoms of infection or other concerning signs are noted.
Dutasteride Pregnancy And Lactation Text: This medication is absolutely contraindicated in women, especially during pregnancy and breast feeding. Feminization of male fetuses is possible if taking while pregnant.
Mirvaso Pregnancy And Lactation Text: This medication has not been assigned a Pregnancy Risk Category. It is unknown if the medication is excreted in breast milk.
Zoryve Pregnancy And Lactation Text: It is unknown if this medication can cause problems during pregnancy and breastfeeding.
Cimetidine Counseling:  I discussed with the patient the risks of Cimetidine including but not limited to gynecomastia, headache, diarrhea, nausea, drowsiness, arrhythmias, pancreatitis, skin rashes, psychosis, bone marrow suppression and kidney toxicity.
Otezla Pregnancy And Lactation Text: This medication is Pregnancy Category C and it isn't known if it is safe during pregnancy. It is unknown if it is excreted in breast milk.
Rinvoq Counseling: I discussed with the patient the risks of Rinvoq therapy including but not limited to upper respiratory tract infections, shingles, cold sores, bronchitis, nausea, cough, fever, acne, and headache. Live vaccines should be avoided.  This medication has been linked to serious infections; higher rate of mortality; malignancy and lymphoproliferative disorders; major adverse cardiovascular events; thrombosis; thrombocytopenia, anemia, and neutropenia; lipid elevations; liver enzyme elevations; and gastrointestinal perforations.
Hydroquinone Counseling:  Patient advised that medication may result in skin irritation, lightening (hypopigmentation), dryness, and burning.  In the event of skin irritation, the patient was advised to reduce the amount of the drug applied or use it less frequently.  Rarely, spots that are treated with hydroquinone can become darker (pseudoochronosis).  Should this occur, patient instructed to stop medication and call the office. The patient verbalized understanding of the proper use and possible adverse effects of hydroquinone.  All of the patient's questions and concerns were addressed.
Rhofade Counseling: Rhofade is a topical medication which can decrease superficial blood flow where applied. Side effects are uncommon and include stinging, redness and allergic reactions.
Tranexamic Acid Counseling:  Patient advised of the small risk of bleeding problems with tranexamic acid. They were also instructed to call if they developed any nausea, vomiting or diarrhea. All of the patient's questions and concerns were addressed.
Ivermectin Pregnancy And Lactation Text: This medication is Pregnancy Category C and it isn't known if it is safe during pregnancy. It is also excreted in breast milk.
Bactrim Counseling:  I discussed with the patient the risks of sulfa antibiotics including but not limited to GI upset, allergic reaction, drug rash, diarrhea, dizziness, photosensitivity, and yeast infections.  Rarely, more serious reactions can occur including but not limited to aplastic anemia, agranulocytosis, methemoglobinemia, blood dyscrasias, liver or kidney failure, lung infiltrates or desquamative/blistering drug rashes.
Bexarotene Pregnancy And Lactation Text: This medication is Pregnancy Category X and should not be given to women who are pregnant or may become pregnant. This medication should not be used if you are breast feeding.
Griseofulvin Counseling:  I discussed with the patient the risks of griseofulvin including but not limited to photosensitivity, cytopenia, liver damage, nausea/vomiting and severe allergy.  The patient understands that this medication is best absorbed when taken with a fatty meal (e.g., ice cream or french fries).
Prednisone Counseling:  I discussed with the patient the risks of prolonged use of prednisone including but not limited to weight gain, insomnia, osteoporosis, mood changes, diabetes, susceptibility to infection, glaucoma and high blood pressure.  In cases where prednisone use is prolonged, patients should be monitored with blood pressure checks, serum glucose levels and an eye exam.  Additionally, the patient may need to be placed on GI prophylaxis, PCP prophylaxis, and calcium and vitamin D supplementation and/or a bisphosphonate.  The patient verbalized understanding of the proper use and the possible adverse effects of prednisone.  All of the patient's questions and concerns were addressed.
Erivedge Counseling- I discussed with the patient the risks of Erivedge including but not limited to nausea, vomiting, diarrhea, constipation, weight loss, changes in the sense of taste, decreased appetite, muscle spasms, and hair loss.  The patient verbalized understanding of the proper use and possible adverse effects of Erivedge.  All of the patient's questions and concerns were addressed.
Azelaic Acid Pregnancy And Lactation Text: This medication is considered safe during pregnancy and breast feeding.
Erythromycin Pregnancy And Lactation Text: This medication is Pregnancy Category B and is considered safe during pregnancy. It is also excreted in breast milk.
Cimetidine Pregnancy And Lactation Text: This medication is Pregnancy Category B and is considered safe during pregnancy. It is also excreted in breast milk and breast feeding isn't recommended.
Topical Clindamycin Counseling: Patient counseled that this medication may cause skin irritation or allergic reactions.  In the event of skin irritation, the patient was advised to reduce the amount of the drug applied or use it less frequently.   The patient verbalized understanding of the proper use and possible adverse effects of clindamycin.  All of the patient's questions and concerns were addressed.
Gabapentin Counseling: I discussed with the patient the risks of gabapentin including but not limited to dizziness, somnolence, fatigue and ataxia.
Azathioprine Pregnancy And Lactation Text: This medication is Pregnancy Category D and isn't considered safe during pregnancy. It is unknown if this medication is excreted in breast milk.
Niacinamide Pregnancy And Lactation Text: These medications are considered safe during pregnancy.
Rifampin Counseling: I discussed with the patient the risks of rifampin including but not limited to liver damage, kidney damage, red-orange body fluids, nausea/vomiting and severe allergy.
Topical Sulfur Applications Pregnancy And Lactation Text: This medication is considered safe during pregnancy and breast feeding secondary to limited systemic absorption.
Dupixent Counseling: I discussed with the patient the risks of dupilumab including but not limited to eye infection and irritation, cold sores, injection site reactions, worsening of asthma, allergic reactions and increased risk of parasitic infection.  Live vaccines should be avoided while taking dupilumab. Dupilumab will also interact with certain medications such as warfarin and cyclosporine. The patient understands that monitoring is required and they must alert us or the primary physician if symptoms of infection or other concerning signs are noted.
Rinvoq Pregnancy And Lactation Text: Based on animal studies, Rinvoq may cause embryo-fetal harm when administered to pregnant women.  The medication should not be used in pregnancy.  Breastfeeding is not recommended during treatment and for 6 days after the last dose.
Opzelura Counseling:  I discussed with the patient the risks of Opzelura including but not limited to nasopharngitis, bronchitis, ear infection, eosinophila, hives, diarrhea, folliculitis, tonsillitis, and rhinorrhea.  Taken orally, this medication has been linked to serious infections; higher rate of mortality; malignancy and lymphoproliferative disorders; major adverse cardiovascular events; thrombosis; thrombocytopenia, anemia, and neutropenia; and lipid elevations.
Oxybutynin Counseling:  I discussed with the patient the risks of oxybutynin including but not limited to skin rash, drowsiness, dry mouth, difficulty urinating, and blurred vision.
Zyclara Counseling:  I discussed with the patient the risks of imiquimod including but not limited to erythema, scaling, itching, weeping, crusting, and pain.  Patient understands that the inflammatory response to imiquimod is variable from person to person and was educated regarded proper titration schedule.  If flu-like symptoms develop, patient knows to discontinue the medication and contact us.
Skyrizi Counseling: I discussed with the patient the risks of risankizumab-rzaa including but not limited to immunosuppression, and serious infections.  The patient understands that monitoring is required including a PPD at baseline and must alert us or the primary physician if symptoms of infection or other concerning signs are noted.
5-Fu Counseling: 5-Fluorouracil Counseling:  I discussed with the patient the risks of 5-fluorouracil including but not limited to erythema, scaling, itching, weeping, crusting, and pain.
Finasteride Male Counseling: Finasteride Counseling:  I discussed with the patient the risks of use of finasteride including but not limited to decreased libido, decreased ejaculate volume, gynecomastia, and depression. Women should not handle medication.  All of the patient's questions and concerns were addressed.
Bactrim Pregnancy And Lactation Text: This medication is Pregnancy Category D and is known to cause fetal risk.  It is also excreted in breast milk.
Isotretinoin Counseling: Patient should get monthly blood tests, not donate blood, not drive at night if vision affected, not share medication, and not undergo elective surgery for 6 months after tx completed. Side effects reviewed, pt to contact office should one occur.
Tranexamic Acid Pregnancy And Lactation Text: It is unknown if this medication is safe during pregnancy or breast feeding.
Prednisone Pregnancy And Lactation Text: This medication is Pregnancy Category C and it isn't know if it is safe during pregnancy. This medication is excreted in breast milk.
Griseofulvin Pregnancy And Lactation Text: This medication is Pregnancy Category X and is known to cause serious birth defects. It is unknown if this medication is excreted in breast milk but breast feeding should be avoided.
Gabapentin Pregnancy And Lactation Text: This medication is Pregnancy Category C and isn't considered safe during pregnancy. It is excreted in breast milk.
Cellcept Counseling:  I discussed with the patient the risks of mycophenolate mofetil including but not limited to infection/immunosuppression, GI upset, hypokalemia, hypercholesterolemia, bone marrow suppression, lymphoproliferative disorders, malignancy, GI ulceration/bleed/perforation, colitis, interstitial lung disease, kidney failure, progressive multifocal leukoencephalopathy, and birth defects.  The patient understands that monitoring is required including a baseline creatinine and regular CBC testing. In addition, patient must alert us immediately if symptoms of infection or other concerning signs are noted.
Doxepin Counseling:  Patient advised that the medication is sedating and not to drive a car after taking this medication. Patient informed of potential adverse effects including but not limited to dry mouth, urinary retention, and blurry vision.  The patient verbalized understanding of the proper use and possible adverse effects of doxepin.  All of the patient's questions and concerns were addressed.
Benzoyl Peroxide Counseling: Patient counseled that medicine may cause skin irritation and bleach clothing.  In the event of skin irritation, the patient was advised to reduce the amount of the drug applied or use it less frequently.   The patient verbalized understanding of the proper use and possible adverse effects of benzoyl peroxide.  All of the patient's questions and concerns were addressed.
Metronidazole Counseling:  I discussed with the patient the risks of metronidazole including but not limited to seizures, nausea/vomiting, a metallic taste in the mouth, nausea/vomiting and severe allergy.
Solaraze Counseling:  I discussed with the patient the risks of Solaraze including but not limited to erythema, scaling, itching, weeping, crusting, and pain.
Wartpeel Counseling:  I discussed with the patient the risks of Wartpeel including but not limited to erythema, scaling, itching, weeping, crusting, and pain.
Opzelura Pregnancy And Lactation Text: There is insufficient data to evaluate drug-associated risk for major birth defects, miscarriage, or other adverse maternal or fetal outcomes.  There is a pregnancy registry that monitors pregnancy outcomes in pregnant persons exposed to the medication during pregnancy.  It is unknown if this medication is excreted in breast milk.  Do not breastfeed during treatment and for about 4 weeks after the last dose.
Nsaids Counseling: NSAID Counseling: I discussed with the patient that NSAIDs should be taken with food. Prolonged use of NSAIDs can result in the development of stomach ulcers.  Patient advised to stop taking NSAIDs if abdominal pain occurs.  The patient verbalized understanding of the proper use and possible adverse effects of NSAIDs.  All of the patient's questions and concerns were addressed.
Oxybutynin Pregnancy And Lactation Text: This medication is Pregnancy Category B and is considered safe during pregnancy. It is unknown if it is excreted in breast milk.
Adbry Counseling: I discussed with the patient the risks of tralokinumab including but not limited to eye infection and irritation, cold sores, injection site reactions, worsening of asthma, allergic reactions and increased risk of parasitic infection.  Live vaccines should be avoided while taking tralokinumab. The patient understands that monitoring is required and they must alert us or the primary physician if symptoms of infection or other concerning signs are noted.
Rifampin Pregnancy And Lactation Text: This medication is Pregnancy Category C and it isn't know if it is safe during pregnancy. It is also excreted in breast milk and should not be used if you are breast feeding.
Dupixent Pregnancy And Lactation Text: This medication likely crosses the placenta but the risk for the fetus is uncertain. This medication is excreted in breast milk.
Finasteride Female Counseling: Finasteride Counseling:  I discussed with the patient the risks of use of finasteride including but not limited to decreased libido and sexual dysfunction. Explained the teratogenic nature of the medication and stressed the importance of not getting pregnant during treatment. All of the patient's questions and concerns were addressed.
Sotyktu Counseling:  I discussed the most common side effects of Sotyktu including: common cold, sore throat, sinus infections, cold sores, canker sores, folliculitis, and acne.  I also discussed more serious side effects of Sotyktu including but not limited to: serious allergic reactions; increased risk for infections such as TB; cancers such as lymphomas; rhabdomyolysis and elevated CPK; and elevated triglycerides and liver enzymes. 
Valtrex Counseling: I discussed with the patient the risks of valacyclovir including but not limited to kidney damage, nausea, vomiting and severe allergy.  The patient understands that if the infection seems to be worsening or is not improving, they are to call.
Imiquimod Counseling:  I discussed with the patient the risks of imiquimod including but not limited to erythema, scaling, itching, weeping, crusting, and pain.  Patient understands that the inflammatory response to imiquimod is variable from person to person and was educated regarded proper titration schedule.  If flu-like symptoms develop, patient knows to discontinue the medication and contact us.
5-Fu Pregnancy And Lactation Text: This medication is Pregnancy Category X and contraindicated in pregnancy and in women who may become pregnant. It is unknown if this medication is excreted in breast milk.
Infliximab Counseling:  I discussed with the patient the risks of infliximab including but not limited to myelosuppression, immunosuppression, autoimmune hepatitis, demyelinating diseases, lymphoma, and serious infections.  The patient understands that monitoring is required including a PPD at baseline and must alert us or the primary physician if symptoms of infection or other concerning signs are noted.
Zyclara Pregnancy And Lactation Text: This medication is Pregnancy Category C. It is unknown if this medication is excreted in breast milk.
Cephalexin Counseling: I counseled the patient regarding use of cephalexin as an antibiotic for prophylactic and/or therapeutic purposes. Cephalexin (commonly prescribed under brand name Keflex) is a cephalosporin antibiotic which is active against numerous classes of bacteria, including most skin bacteria. Side effects may include nausea, diarrhea, gastrointestinal upset, rash, hives, yeast infections, and in rare cases, hepatitis, kidney disease, seizures, fever, confusion, neurologic symptoms, and others. Patients with severe allergies to penicillin medications are cautioned that there is about a 10% incidence of cross-reactivity with cephalosporins. When possible, patients with penicillin allergies should use alternatives to cephalosporins for antibiotic therapy.
Isotretinoin Pregnancy And Lactation Text: This medication is Pregnancy Category X and is considered extremely dangerous during pregnancy. It is unknown if it is excreted in breast milk.
Libtayo Counseling- I discussed with the patient the risks of Libtayo including but not limited to nausea, vomiting, diarrhea, and bone or muscle pain.  The patient verbalized understanding of the proper use and possible adverse effects of Libtayo.  All of the patient's questions and concerns were addressed.
Itraconazole Counseling:  I discussed with the patient the risks of itraconazole including but not limited to liver damage, nausea/vomiting, neuropathy, and severe allergy.  The patient understands that this medication is best absorbed when taken with acidic beverages such as non-diet cola or ginger ale.  The patient understands that monitoring is required including baseline LFTs and repeat LFTs at intervals.  The patient understands that they are to contact us or the primary physician if concerning signs are noted.
Metronidazole Pregnancy And Lactation Text: This medication is Pregnancy Category B and considered safe during pregnancy.  It is also excreted in breast milk.
Glycopyrrolate Counseling:  I discussed with the patient the risks of glycopyrrolate including but not limited to skin rash, drowsiness, dry mouth, difficulty urinating, and blurred vision.
Benzoyl Peroxide Pregnancy And Lactation Text: This medication is Pregnancy Category C. It is unknown if benzoyl peroxide is excreted in breast milk.
Doxepin Pregnancy And Lactation Text: This medication is Pregnancy Category C and it isn't known if it is safe during pregnancy. It is also excreted in breast milk and breast feeding isn't recommended.
Picato Counseling:  I discussed with the patient the risks of Picato including but not limited to erythema, scaling, itching, weeping, crusting, and pain.
Sotyktu Pregnancy And Lactation Text: There is insufficient data to evaluate whether or not Sotyktu is safe to use during pregnancy.   It is not known if Sotyktu passes into breast milk and whether or not it is safe to use when breastfeeding.  
Topical Ketoconazole Counseling: Patient counseled that this medication may cause skin irritation or allergic reactions.  In the event of skin irritation, the patient was advised to reduce the amount of the drug applied or use it less frequently.   The patient verbalized understanding of the proper use and possible adverse effects of ketoconazole.  All of the patient's questions and concerns were addressed.
Sarecycline Counseling: Patient advised regarding possible photosensitivity and discoloration of the teeth, skin, lips, tongue and gums.  Patient instructed to avoid sunlight, if possible.  When exposed to sunlight, patients should wear protective clothing, sunglasses, and sunscreen.  The patient was instructed to call the office immediately if the following severe adverse effects occur:  hearing changes, easy bruising/bleeding, severe headache, or vision changes.  The patient verbalized understanding of the proper use and possible adverse effects of sarecycline.  All of the patient's questions and concerns were addressed.
Nsaids Pregnancy And Lactation Text: These medications are considered safe up to 30 weeks gestation. It is excreted in breast milk.
Drysol Counseling:  I discussed with the patient the risks of drysol/aluminum chloride including but not limited to skin rash, itching, irritation, burning.
Finasteride Pregnancy And Lactation Text: This medication is absolutely contraindicated during pregnancy. It is unknown if it is excreted in breast milk.
Adbry Pregnancy And Lactation Text: It is unknown if this medication will adversely affect pregnancy or breast feeding.
Solaraze Pregnancy And Lactation Text: This medication is Pregnancy Category B and is considered safe. There is some data to suggest avoiding during the third trimester. It is unknown if this medication is excreted in breast milk.
Enbrel Counseling:  I discussed with the patient the risks of etanercept including but not limited to myelosuppression, immunosuppression, autoimmune hepatitis, demyelinating diseases, lymphoma, and infections.  The patient understands that monitoring is required including a PPD at baseline and must alert us or the primary physician if symptoms of infection or other concerning signs are noted.
Propranolol Counseling:  I discussed with the patient the risks of propranolol including but not limited to low heart rate, low blood pressure, low blood sugar, restlessness and increased cold sensitivity. They should call the office if they experience any of these side effects.
Cibinqo Counseling: I discussed with the patient the risks of Cibinqo therapy including but not limited to common cold, nausea, headache, cold sores, increased blood CPK levels, dizziness, UTIs, fatigue, acne, and vomitting. Live vaccines should be avoided.  This medication has been linked to serious infections; higher rate of mortality; malignancy and lymphoproliferative disorders; major adverse cardiovascular events; thrombosis; thrombocytopenia and lymphopenia; lipid elevations; and retinal detachment.
Detail Level: Zone
Stelara Counseling:  I discussed with the patient the risks of ustekinumab including but not limited to immunosuppression, malignancy, posterior leukoencephalopathy syndrome, and serious infections.  The patient understands that monitoring is required including a PPD at baseline and must alert us or the primary physician if symptoms of infection or other concerning signs are noted.
Cephalexin Pregnancy And Lactation Text: This medication is Pregnancy Category B and considered safe during pregnancy.  It is also excreted in breast milk but can be used safely for shorter doses.
High Dose Vitamin A Counseling: Side effects reviewed, pt to contact office should one occur.
Valtrex Pregnancy And Lactation Text: this medication is Pregnancy Category B and is considered safe during pregnancy. This medication is not directly found in breast milk but it's metabolite acyclovir is present.
Libtayo Pregnancy And Lactation Text: This medication is contraindicated in pregnancy and when breast feeding.
Cyclophosphamide Counseling:  I discussed with the patient the risks of cyclophosphamide including but not limited to hair loss, hormonal abnormalities, decreased fertility, abdominal pain, diarrhea, nausea and vomiting, bone marrow suppression and infection. The patient understands that monitoring is required while taking this medication.
Glycopyrrolate Pregnancy And Lactation Text: This medication is Pregnancy Category B and is considered safe during pregnancy. It is unknown if it is excreted breast milk.
Opioid Counseling: I discussed with the patient the potential side effects of opioids including but not limited to addiction, altered mental status, and depression. I stressed avoiding alcohol, benzodiazepines, muscle relaxants and sleep aids unless specifically okayed by a physician. The patient verbalized understanding of the proper use and possible adverse effects of opioids. All of the patient's questions and concerns were addressed. They were instructed to flush the remaining pills down the toilet if they did not need them for pain.
Carac Counseling:  I discussed with the patient the risks of Carac including but not limited to erythema, scaling, itching, weeping, crusting, and pain.
Minocycline Counseling: Patient advised regarding possible photosensitivity and discoloration of the teeth, skin, lips, tongue and gums.  Patient instructed to avoid sunlight, if possible.  When exposed to sunlight, patients should wear protective clothing, sunglasses, and sunscreen.  The patient was instructed to call the office immediately if the following severe adverse effects occur:  hearing changes, easy bruising/bleeding, severe headache, or vision changes.  The patient verbalized understanding of the proper use and possible adverse effects of minocycline.  All of the patient's questions and concerns were addressed.
Winlevi Counseling:  I discussed with the patient the risks of topical clascoterone including but not limited to erythema, scaling, itching, and stinging. Patient voiced their understanding.
Xeljanz Counseling: I discussed with the patient the risks of Xeljanz therapy including increased risk of infection, liver issues, headache, diarrhea, or cold symptoms. Live vaccines should be avoided. They were instructed to call if they have any problems.
Cibinqo Pregnancy And Lactation Text: It is unknown if this medication will adversely affect pregnancy or breast feeding.  You should not take this medication if you are currently pregnant or planning a pregnancy or while breastfeeding.
Hydroxyzine Counseling: Patient advised that the medication is sedating and not to drive a car after taking this medication.  Patient informed of potential adverse effects including but not limited to dry mouth, urinary retention, and blurry vision.  The patient verbalized understanding of the proper use and possible adverse effects of hydroxyzine.  All of the patient's questions and concerns were addressed.
Klisyri Counseling:  I discussed with the patient the risks of Klisyri including but not limited to erythema, scaling, itching, weeping, crusting, and pain.
Propranolol Pregnancy And Lactation Text: This medication is Pregnancy Category C and it isn't known if it is safe during pregnancy. It is excreted in breast milk.
Birth Control Pills Counseling: Birth Control Pill Counseling: I discussed with the patient the potential side effects of OCPs including but not limited to increased risk of stroke, heart attack, thrombophlebitis, deep venous thrombosis, hepatic adenomas, breast changes, GI upset, headaches, and depression.  The patient verbalized understanding of the proper use and possible adverse effects of OCPs. All of the patient's questions and concerns were addressed.
Sarecycline Pregnancy And Lactation Text: This medication is Pregnancy Category D and not consider safe during pregnancy. It is also excreted in breast milk.
Soolantra Counseling: I discussed with the patients the risks of topial Soolantra. This is a medicine which decreases the number of mites and inflammation in the skin. You experience burning, stinging, eye irritation or allergic reactions.  Please call our office if you develop any problems from using this medication.
Bimzelx Counseling:  I discussed with the patient the risks of Bimzelx including but not limited to depression, immunosuppression, allergic reactions and infections.  The patient understands that monitoring is required including a PPD at baseline and must alert us or the primary physician if symptoms of infection or other concerning signs are noted.
Olanzapine Counseling- I discussed with the patient the common side effects of olanzapine including but are not limited to: lack of energy, dry mouth, increased appetite, sleepiness, tremor, constipation, dizziness, changes in behavior, or restlessness.  Explained that teenagers are more likely to experience headaches, abdominal pain, pain in the arms or legs, tiredness, and sleepiness.  Serious side effects include but are not limited: increased risk of death in elderly patients who are confused, have memory loss, or dementia-related psychosis; hyperglycemia; increased cholesterol and triglycerides; and weight gain.
Include Pregnancy/Lactation Warning?: No
Clindamycin Counseling: I counseled the patient regarding use of clindamycin as an antibiotic for prophylactic and/or therapeutic purposes. Clindamycin is active against numerous classes of bacteria, including skin bacteria. Side effects may include nausea, diarrhea, gastrointestinal upset, rash, hives, yeast infections, and in rare cases, colitis.
Ketoconazole Counseling:   Patient counseled regarding improving absorption with orange juice.  Adverse effects include but are not limited to breast enlargement, headache, diarrhea, nausea, upset stomach, liver function test abnormalities, taste disturbance, and stomach pain.  There is a rare possibility of liver failure that can occur when taking ketoconazole. The patient understands that monitoring of LFTs may be required, especially at baseline. The patient verbalized understanding of the proper use and possible adverse effects of ketoconazole.  All of the patient's questions and concerns were addressed.
Rituxan Counseling:  I discussed with the patient the risks of Rituxan infusions. Side effects can include infusion reactions, severe drug rashes including mucocutaneous reactions, reactivation of latent hepatitis and other infections and rarely progressive multifocal leukoencephalopathy.  All of the patient's questions and concerns were addressed.
High Dose Vitamin A Pregnancy And Lactation Text: High dose vitamin A therapy is contraindicated during pregnancy and breast feeding.
Odomzo Counseling- I discussed with the patient the risks of Odomzo including but not limited to nausea, vomiting, diarrhea, constipation, weight loss, changes in the sense of taste, decreased appetite, muscle spasms, and hair loss.  The patient verbalized understanding of the proper use and possible adverse effects of Odomzo.  All of the patient's questions and concerns were addressed.
Soolantra Pregnancy And Lactation Text: This medication is Pregnancy Category C. This medication is considered safe during breast feeding.
Topical Metronidazole Counseling: Metronidazole is a topical antibiotic medication. You may experience burning, stinging, redness, or allergic reactions.  Please call our office if you develop any problems from using this medication.
Cyclophosphamide Pregnancy And Lactation Text: This medication is Pregnancy Category D and it isn't considered safe during pregnancy. This medication is excreted in breast milk.
Hydroxychloroquine Counseling:  I discussed with the patient that a baseline ophthalmologic exam is needed at the start of therapy and every year thereafter while on therapy. A CBC may also be warranted for monitoring.  The side effects of this medication were discussed with the patient, including but not limited to agranulocytosis, aplastic anemia, seizures, rashes, retinopathy, and liver toxicity. Patient instructed to call the office should any adverse effect occur.  The patient verbalized understanding of the proper use and possible adverse effects of Plaquenil.  All the patient's questions and concerns were addressed.
Winlevi Pregnancy And Lactation Text: This medication is considered safe during pregnancy and breastfeeding.
Tetracycline Counseling: Patient counseled regarding possible photosensitivity and increased risk for sunburn.  Patient instructed to avoid sunlight, if possible.  When exposed to sunlight, patients should wear protective clothing, sunglasses, and sunscreen.  The patient was instructed to call the office immediately if the following severe adverse effects occur:  hearing changes, easy bruising/bleeding, severe headache, or vision changes.  The patient verbalized understanding of the proper use and possible adverse effects of tetracycline.  All of the patient's questions and concerns were addressed. Patient understands to avoid pregnancy while on therapy due to potential birth defects.
Opioid Pregnancy And Lactation Text: These medications can lead to premature delivery and should be avoided during pregnancy. These medications are also present in breast milk in small amounts.
Hydroxyzine Pregnancy And Lactation Text: This medication is not safe during pregnancy and should not be taken. It is also excreted in breast milk and breast feeding isn't recommended.
Humira Counseling:  I discussed with the patient the risks of adalimumab including but not limited to myelosuppression, immunosuppression, autoimmune hepatitis, demyelinating diseases, lymphoma, and serious infections.  The patient understands that monitoring is required including a PPD at baseline and must alert us or the primary physician if symptoms of infection or other concerning signs are noted.
Rituxan Pregnancy And Lactation Text: This medication is Pregnancy Category C and it isn't know if it is safe during pregnancy. It is unknown if this medication is excreted in breast milk but similar antibodies are known to be excreted.
Xelcoyz Pregnancy And Lactation Text: This medication is Pregnancy Category D and is not considered safe during pregnancy.  The risk during breast feeding is also uncertain.
Olanzapine Pregnancy And Lactation Text: This medication is pregnancy category C.   There are no adequate and well controlled trials with olanzapine in pregnant females.  Olanzapine should be used during pregnancy only if the potential benefit justifies the potential risk to the fetus.   In a study in lactating healthy women, olanzapine was excreted in breast milk.  It is recommended that women taking olanzapine should not breast feed.
Klisyri Pregnancy And Lactation Text: It is unknown if this medication can harm a developing fetus or if it is excreted in breast milk.
Taltz Counseling: I discussed with the patient the risks of ixekizumab including but not limited to immunosuppression, serious infections, worsening of inflammatory bowel disease and drug reactions.  The patient understands that monitoring is required including a PPD at baseline and must alert us or the primary physician if symptoms of infection or other concerning signs are noted.
Litfulo Counseling: I discussed with the patient the risks of Litfulo therapy including but not limited to upper respiratory tract infections, shingles, cold sores, and nausea. Live vaccines should be avoided.  This medication has been linked to serious infections; higher rate of mortality; malignancy and lymphoproliferative disorders; major adverse cardiovascular events; thrombosis; gastrointestinal perforations; neutropenia; lymphopenia; anemia; liver enzyme elevations; and lipid elevations.
SSKI Counseling:  I discussed with the patient the risks of SSKI including but not limited to thyroid abnormalities, metallic taste, GI upset, fever, headache, acne, arthralgias, paraesthesias, lymphadenopathy, easy bleeding, arrhythmias, and allergic reaction.
Elidel Counseling: Patient may experience a mild burning sensation during topical application. Elidel is not approved in children less than 2 years of age. There have been case reports of hematologic and skin malignancies in patients using topical calcineurin inhibitors although causality is questionable.
Protopic Counseling: Patient may experience a mild burning sensation during topical application. Protopic is not approved in children less than 2 years of age. There have been case reports of hematologic and skin malignancies in patients using topical calcineurin inhibitors although causality is questionable.
Birth Control Pills Pregnancy And Lactation Text: This medication should be avoided if pregnant and for the first 30 days post-partum.
Bimzelx Pregnancy And Lactation Text: This medication crosses the placenta and the safety is uncertain during pregnancy. It is unknown if this medication is present in breast milk.
Clindamycin Pregnancy And Lactation Text: This medication can be used in pregnancy if certain situations. Clindamycin is also present in breast milk.
Ketoconazole Pregnancy And Lactation Text: This medication is Pregnancy Category C and it isn't know if it is safe during pregnancy. It is also excreted in breast milk and breast feeding isn't recommended.
Hydroxychloroquine Pregnancy And Lactation Text: This medication has been shown to cause fetal harm but it isn't assigned a Pregnancy Risk Category. There are small amounts excreted in breast milk.
Cyclosporine Counseling:  I discussed with the patient the risks of cyclosporine including but not limited to hypertension, gingival hyperplasia,myelosuppression, immunosuppression, liver damage, kidney damage, neurotoxicity, lymphoma, and serious infections. The patient understands that monitoring is required including baseline blood pressure, CBC, CMP, lipid panel and uric acid, and then 1-2 times monthly CMP and blood pressure.
Topical Retinoid counseling:  Patient advised to apply a pea-sized amount only at bedtime and wait 30 minutes after washing their face before applying.  If too drying, patient may add a non-comedogenic moisturizer. The patient verbalized understanding of the proper use and possible adverse effects of retinoids.  All of the patient's questions and concerns were addressed.
Oral Minoxidil Counseling- I discussed with the patient the risks of oral minoxidil including but not limited to shortness of breath, swelling of the feet or ankles, dizziness, lightheadedness, unwanted hair growth and allergic reaction.  The patient verbalized understanding of the proper use and possible adverse effects of oral minoxidil.  All of the patient's questions and concerns were addressed.
Protopic Pregnancy And Lactation Text: This medication is Pregnancy Category C. It is unknown if this medication is excreted in breast milk when applied topically.
Litfulo Pregnancy And Lactation Text: Based on animal studies, Lifulo may cause embryo-fetal harm when administered to pregnant women.  The medication should not be used in pregnancy.  Breastfeeding is not recommended during treatment.
Clofazimine Counseling:  I discussed with the patient the risks of clofazimine including but not limited to skin and eye pigmentation, liver damage, nausea/vomiting, gastrointestinal bleeding and allergy.
Calcipotriene Counseling:  I discussed with the patient the risks of calcipotriene including but not limited to erythema, scaling, itching, and irritation.
Spironolactone Counseling: Patient advised regarding risks of diarrhea, abdominal pain, hyperkalemia, birth defects (for female patients), liver toxicity and renal toxicity. The patient may need blood work to monitor liver and kidney function and potassium levels while on therapy. The patient verbalized understanding of the proper use and possible adverse effects of spironolactone.  All of the patient's questions and concerns were addressed.
Topical Metronidazole Pregnancy And Lactation Text: This medication is Pregnancy Category B and considered safe during pregnancy.  It is also considered safe to use while breastfeeding.
Cimzia Counseling:  I discussed with the patient the risks of Cimzia including but not limited to immunosuppression, allergic reactions and infections.  The patient understands that monitoring is required including a PPD at baseline and must alert us or the primary physician if symptoms of infection or other concerning signs are noted.
Sski Pregnancy And Lactation Text: This medication is Pregnancy Category D and isn't considered safe during pregnancy. It is excreted in breast milk.
Minoxidil Counseling: Minoxidil is a topical medication which can increase blood flow where it is applied. It is uncertain how this medication increases hair growth. Side effects are uncommon and include stinging and allergic reactions.
VTAMA Counseling: I discussed with the patient that VTAMA is not for use in the eyes, mouth or mouth. They should call the office if they develop any signs of allergic reactions to VTAMA. The patient verbalized understanding of the proper use and possible adverse effects of VTAMA.  All of the patient's questions and concerns were addressed.
Siliq Counseling:  I discussed with the patient the risks of Siliq including but not limited to new or worsening depression, suicidal thoughts and behavior, immunosuppression, malignancy, posterior leukoencephalopathy syndrome, and serious infections.  The patient understands that monitoring is required including a PPD at baseline and must alert us or the primary physician if symptoms of infection or other concerning signs are noted. There is also a special program designed to monitor depression which is required with Siliq.
Arava Counseling:  Patient counseled regarding adverse effects of Arava including but not limited to nausea, vomiting, abnormalities in liver function tests. Patients may develop mouth sores, rash, diarrhea, and abnormalities in blood counts. The patient understands that monitoring is required including LFTs and blood counts.  There is a rare possibility of scarring of the liver and lung problems that can occur when taking methotrexate. Persistent nausea, loss of appetite, pale stools, dark urine, cough, and shortness of breath should be reported immediately. Patient advised to discontinue Arava treatment and consult with a physician prior to attempting conception. The patient will have to undergo a treatment to eliminate Arava from the body prior to conception.
Calcipotriene Pregnancy And Lactation Text: The use of this medication during pregnancy or lactation is not recommended as there is insufficient data.
Aklief counseling:  Patient advised to apply a pea-sized amount only at bedtime and wait 30 minutes after washing their face before applying.  If too drying, patient may add a non-comedogenic moisturizer.  The most commonly reported side effects including irritation, redness, scaling, dryness, stinging, burning, itching, and increased risk of sunburn.  The patient verbalized understanding of the proper use and possible adverse effects of retinoids.  All of the patient's questions and concerns were addressed.
Doxycycline Counseling:  Patient counseled regarding possible photosensitivity and increased risk for sunburn.  Patient instructed to avoid sunlight, if possible.  When exposed to sunlight, patients should wear protective clothing, sunglasses, and sunscreen.  The patient was instructed to call the office immediately if the following severe adverse effects occur:  hearing changes, easy bruising/bleeding, severe headache, or vision changes.  The patient verbalized understanding of the proper use and possible adverse effects of doxycycline.  All of the patient's questions and concerns were addressed.
Acitretin Counseling:  I discussed with the patient the risks of acitretin including but not limited to hair loss, dry lips/skin/eyes, liver damage, hyperlipidemia, depression/suicidal ideation, photosensitivity.  Serious rare side effects can include but are not limited to pancreatitis, pseudotumor cerebri, bony changes, clot formation/stroke/heart attack.  Patient understands that alcohol is contraindicated since it can result in liver toxicity and significantly prolong the elimination of the drug by many years.
Terbinafine Counseling: Patient counseling regarding adverse effects of terbinafine including but not limited to headache, diarrhea, rash, upset stomach, liver function test abnormalities, itching, taste/smell disturbance, nausea, abdominal pain, and flatulence.  There is a rare possibility of liver failure that can occur when taking terbinafine.  The patient understands that a baseline LFT and kidney function test may be required. The patient verbalized understanding of the proper use and possible adverse effects of terbinafine.  All of the patient's questions and concerns were addressed.
Albendazole Counseling:  I discussed with the patient the risks of albendazole including but not limited to cytopenia, kidney damage, nausea/vomiting and severe allergy.  The patient understands that this medication is being used in an off-label manner.
Cantharidin Counseling:  I discussed with the patient the risks of Cantharidin including but not limited to pain, redness, burning, itching, and blistering.
Qbrexza Counseling:  I discussed with the patient the risks of Qbrexza including but not limited to headache, mydriasis, blurred vision, dry eyes, nasal dryness, dry mouth, dry throat, dry skin, urinary hesitation, and constipation.  Local skin reactions including erythema, burning, stinging, and itching can also occur.
Low Dose Naltrexone Counseling- I discussed with the patient the potential risks and side effects of low dose naltrexone including but not limited to: more vivid dreams, headaches, nausea, vomiting, abdominal pain, fatigue, dizziness, and anxiety.
Topical Steroids Counseling: I discussed with the patient that prolonged use of topical steroids can result in the increased appearance of superficial blood vessels (telangiectasias), lightening (hypopigmentation) and thinning of the skin (atrophy).  Patient understands to avoid using high potency steroids in skin folds, the groin or the face.  The patient verbalized understanding of the proper use and possible adverse effects of topical steroids.  All of the patient's questions and concerns were addressed.
Eucrisa Counseling: Patient may experience a mild burning sensation during topical application. Eucrisa is not approved in children less than 3 months of age.
Oral Minoxidil Pregnancy And Lactation Text: This medication should only be used when clearly needed if you are pregnant, attempting to become pregnant or breast feeding.
Cimzia Pregnancy And Lactation Text: This medication crosses the placenta but can be considered safe in certain situations. Cimzia may be excreted in breast milk.
Spironolactone Pregnancy And Lactation Text: This medication can cause feminization of the male fetus and should be avoided during pregnancy. The active metabolite is also found in breast milk.
Xolair Counseling:  Patient informed of potential adverse effects including but not limited to fever, muscle aches, rash and allergic reactions.  The patient verbalized understanding of the proper use and possible adverse effects of Xolair.  All of the patient's questions and concerns were addressed.
Dutasteride Male Counseling: Dustasteride Counseling:  I discussed with the patient the risks of use of dutasteride including but not limited to decreased libido, decreased ejaculate volume, and gynecomastia. Women who can become pregnant should not handle medication.  All of the patient's questions and concerns were addressed.
Hyrimoz Counseling:  I discussed with the patient the risks of adalimumab including but not limited to myelosuppression, immunosuppression, autoimmune hepatitis, demyelinating diseases, lymphoma, and serious infections.  The patient understands that monitoring is required including a PPD at baseline and must alert us or the primary physician if symptoms of infection or other concerning signs are noted.
Thalidomide Counseling: I discussed with the patient the risks of thalidomide including but not limited to birth defects, anxiety, weakness, chest pain, dizziness, cough and severe allergy.
Olumiant Counseling: I discussed with the patient the risks of Olumiant therapy including but not limited to upper respiratory tract infections, shingles, cold sores, and nausea. Live vaccines should be avoided.  This medication has been linked to serious infections; higher rate of mortality; malignancy and lymphoproliferative disorders; major adverse cardiovascular events; thrombosis; gastrointestinal perforations; neutropenia; lymphopenia; anemia; liver enzyme elevations; and lipid elevations.
Cantharidin Pregnancy And Lactation Text: This medication has not been proven safe during pregnancy. It is unknown if this medication is excreted in breast milk.
Tremfya Counseling: I discussed with the patient the risks of guselkumab including but not limited to immunosuppression, serious infections, and drug reactions.  The patient understands that monitoring is required including a PPD at baseline and must alert us or the primary physician if symptoms of infection or other concerning signs are noted.
Aklief Pregnancy And Lactation Text: It is unknown if this medication is safe to use during pregnancy.  It is unknown if this medication is excreted in breast milk.  Breastfeeding women should use the topical cream on the smallest area of the skin for the shortest time needed while breastfeeding.  Do not apply to nipple and areola.
Doxycycline Pregnancy And Lactation Text: This medication is Pregnancy Category D and not consider safe during pregnancy. It is also excreted in breast milk but is considered safe for shorter treatment courses.
Azithromycin Counseling:  I discussed with the patient the risks of azithromycin including but not limited to GI upset, allergic reaction, drug rash, diarrhea, and yeast infections.
Acitretin Pregnancy And Lactation Text: This medication is Pregnancy Category X and should not be given to women who are pregnant or may become pregnant in the future. This medication is excreted in breast milk.
Methotrexate Counseling:  Patient counseled regarding adverse effects of methotrexate including but not limited to nausea, vomiting, abnormalities in liver function tests. Patients may develop mouth sores, rash, diarrhea, and abnormalities in blood counts. The patient understands that monitoring is required including LFT's and blood counts.  There is a rare possibility of scarring of the liver and lung problems that can occur when taking methotrexate. Persistent nausea, loss of appetite, pale stools, dark urine, cough, and shortness of breath should be reported immediately. Patient advised to discontinue methotrexate treatment at least three months before attempting to become pregnant.  I discussed the need for folate supplements while taking methotrexate.  These supplements can decrease side effects during methotrexate treatment. The patient verbalized understanding of the proper use and possible adverse effects of methotrexate.  All of the patient's questions and concerns were addressed.
Fluconazole Counseling:  Patient counseled regarding adverse effects of fluconazole including but not limited to headache, diarrhea, nausea, upset stomach, liver function test abnormalities, taste disturbance, and stomach pain.  There is a rare possibility of liver failure that can occur when taking fluconazole.  The patient understands that monitoring of LFTs and kidney function test may be required, especially at baseline. The patient verbalized understanding of the proper use and possible adverse effects of fluconazole.  All of the patient's questions and concerns were addressed.
Low Dose Naltrexone Pregnancy And Lactation Text: Naltrexone is pregnancy category C.  There have been no adequate and well-controlled studies in pregnant women.  It should be used in pregnancy only if the potential benefit justifies the potential risk to the fetus.   Limited data indicates that naltrexone is minimally excreted into breastmilk.
Dapsone Counseling: I discussed with the patient the risks of dapsone including but not limited to hemolytic anemia, agranulocytosis, rashes, methemoglobinemia, kidney failure, peripheral neuropathy, headaches, GI upset, and liver toxicity.  Patients who start dapsone require monitoring including baseline LFTs and weekly CBCs for the first month, then every month thereafter.  The patient verbalized understanding of the proper use and possible adverse effects of dapsone.  All of the patient's questions and concerns were addressed.

## 2024-07-17 NOTE — PROCEDURE: LIQUID NITROGEN
Post-Care Instructions: I reviewed with the patient in detail post-care instructions. Patient is to wear sunprotection, and avoid picking at any of the treated lesions. Pt may apply Vaseline to crusted or scabbing areas.
Duration Of Freeze Thaw-Cycle (Seconds): 0
Consent: The patient's consent was obtained including but not limited to risks of crusting, scabbing, blistering, scarring, darker or lighter pigmentary change, recurrence, incomplete removal and infection.
Number Of Freeze-Thaw Cycles: 2 freeze-thaw cycles
Show Aperture Variable?: Yes
Detail Level: Detailed
Render Note In Bullet Format When Appropriate: No

## 2024-07-17 NOTE — PROCEDURE: DEFER
Size Of Lesion In Cm (Optional): 4
Detail Level: Detailed
Introduction Text (Please End With A Colon): The following procedure was deferred:
X Size Of Lesion In Cm (Optional): 0

## 2024-07-19 ENCOUNTER — TELEPHONE (OUTPATIENT)
Dept: INTERNAL MEDICINE CLINIC | Facility: CLINIC | Age: 74
End: 2024-07-19

## 2024-07-19 NOTE — TELEPHONE ENCOUNTER
Hey, Padgett!  I am a hassle today!  See message below. I ordered an extended cardiac  heart monitor and the patient needs to have this placed. Can we find out how she can get this set up? Let me know if I need to order something different.   Thanks!  Priscilla      Replying to your previous message on this thread. As of 10 minutes ago, I had not heard from [place] to schedule the placement of the heart monitor so I called [phone] (the number you listed) and after a long period on hold, they told me that the heart monitor had to be scheduled through your office (I don't think this is right but I'm just repeating what I was told). What do I need to do? Thanks. Kalee

## 2024-07-28 ENCOUNTER — TELEPHONE (OUTPATIENT)
Dept: INTERNAL MEDICINE CLINIC | Facility: CLINIC | Age: 74
End: 2024-07-28

## 2024-07-29 NOTE — TELEPHONE ENCOUNTER
I have talked with Ms. Farmer and have given her this message. She is going to send a message thru the portal to Patricia she stated.

## 2024-08-15 NOTE — PROGRESS NOTES
Patient sent [facility] message requesting new prescription for CPAP machine.  I received the fax from the [facility] requesting a complete signed and dated Rx with pressure settings for Pap device, Rx must state \"and all supplies\"  They also need recent clinical notes and sleep study which I have printed and will fax back with prescription.    Please advise.  Thank you!

## 2024-08-19 ENCOUNTER — TELEPHONE (OUTPATIENT)
Dept: INTERNAL MEDICINE CLINIC | Facility: CLINIC | Age: 74
End: 2024-08-19

## 2024-08-19 DIAGNOSIS — G47.33 OSA ON CPAP: Primary | ICD-10-CM

## 2024-08-19 NOTE — TELEPHONE ENCOUNTER
Patient needs CPAP machine prescription with pressure settings and supplies.     Waiting on patient to return call for pressure settings.

## 2024-08-20 NOTE — TELEPHONE ENCOUNTER
Patient states her pressure setting is 7.0-13.0 cmH2O.  Prescription pended. I can fax over orders when its signed.  Please advise.  Thank you!!    Thanks!   I signed.   Hope you are having a great day!

## 2024-08-26 ENCOUNTER — TELEPHONE (OUTPATIENT)
Age: 74
End: 2024-08-26

## 2024-08-26 NOTE — TELEPHONE ENCOUNTER
Patient is wearing a 30 day monitor  and needs more stickers. Patient will be in [place] today and would like to  some. Please call patient to let her know if this is possible.

## 2024-08-26 NOTE — TELEPHONE ENCOUNTER
Called and informed pt extra stickers for monitor are available for pickup at  of [facility] office. Pt voiced understanding.

## 2024-09-04 ENCOUNTER — APPOINTMENT (RX ONLY)
Dept: URBAN - METROPOLITAN AREA CLINIC 329 | Facility: CLINIC | Age: 74
Setting detail: DERMATOLOGY
End: 2024-09-04

## 2024-09-04 DIAGNOSIS — D17 BENIGN LIPOMATOUS NEOPLASM: ICD-10-CM

## 2024-09-04 PROBLEM — D17.21 BENIGN LIPOMATOUS NEOPLASM OF SKIN AND SUBCUTANEOUS TISSUE OF RIGHT ARM: Status: ACTIVE | Noted: 2024-09-04

## 2024-09-04 PROCEDURE — ? COUNSELING

## 2024-09-04 PROCEDURE — ? EXCISION

## 2024-09-04 PROCEDURE — 12032 INTMD RPR S/A/T/EXT 2.6-7.5: CPT

## 2024-09-04 PROCEDURE — 11406 EXC TR-EXT B9+MARG >4.0 CM: CPT

## 2024-09-04 ASSESSMENT — LOCATION ZONE DERM: LOCATION ZONE: ARM

## 2024-09-04 ASSESSMENT — LOCATION DETAILED DESCRIPTION DERM: LOCATION DETAILED: RIGHT LATERAL DISTAL UPPER ARM

## 2024-09-04 ASSESSMENT — LOCATION SIMPLE DESCRIPTION DERM: LOCATION SIMPLE: RIGHT UPPER ARM

## 2024-09-04 NOTE — PROCEDURE: EXCISION

## 2024-09-16 ENCOUNTER — INITIAL CONSULT (OUTPATIENT)
Age: 74
End: 2024-09-16
Payer: MEDICARE

## 2024-09-16 VITALS
BODY MASS INDEX: 17.66 KG/M2 | SYSTOLIC BLOOD PRESSURE: 130 MMHG | WEIGHT: 106 LBS | HEART RATE: 70 BPM | DIASTOLIC BLOOD PRESSURE: 68 MMHG | HEIGHT: 65 IN

## 2024-09-16 DIAGNOSIS — R55 SYNCOPE, UNSPECIFIED SYNCOPE TYPE: Primary | ICD-10-CM

## 2024-09-16 PROCEDURE — 99204 OFFICE O/P NEW MOD 45 MIN: CPT | Performed by: INTERNAL MEDICINE

## 2024-09-16 PROCEDURE — 1090F PRES/ABSN URINE INCON ASSESS: CPT | Performed by: INTERNAL MEDICINE

## 2024-09-16 PROCEDURE — 3017F COLORECTAL CA SCREEN DOC REV: CPT | Performed by: INTERNAL MEDICINE

## 2024-09-16 PROCEDURE — 1036F TOBACCO NON-USER: CPT | Performed by: INTERNAL MEDICINE

## 2024-09-16 PROCEDURE — G8419 CALC BMI OUT NRM PARAM NOF/U: HCPCS | Performed by: INTERNAL MEDICINE

## 2024-09-16 PROCEDURE — G8399 PT W/DXA RESULTS DOCUMENT: HCPCS | Performed by: INTERNAL MEDICINE

## 2024-09-16 PROCEDURE — G8428 CUR MEDS NOT DOCUMENT: HCPCS | Performed by: INTERNAL MEDICINE

## 2024-09-16 PROCEDURE — 1123F ACP DISCUSS/DSCN MKR DOCD: CPT | Performed by: INTERNAL MEDICINE

## 2024-09-16 ASSESSMENT — ENCOUNTER SYMPTOMS
ABDOMINAL PAIN: 0
SHORTNESS OF BREATH: 0

## 2024-09-18 ENCOUNTER — APPOINTMENT (RX ONLY)
Dept: URBAN - METROPOLITAN AREA CLINIC 329 | Facility: CLINIC | Age: 74
Setting detail: DERMATOLOGY
End: 2024-09-18

## 2024-09-18 DIAGNOSIS — Z48.02 ENCOUNTER FOR REMOVAL OF SUTURES: ICD-10-CM

## 2024-09-18 PROCEDURE — ? COUNSELING

## 2024-09-18 PROCEDURE — ? SUTURE REMOVAL

## 2024-09-18 PROCEDURE — ? FULL BODY SKIN EXAM - DECLINED

## 2024-09-18 ASSESSMENT — LOCATION SIMPLE DESCRIPTION DERM: LOCATION SIMPLE: RIGHT UPPER ARM

## 2024-09-18 ASSESSMENT — LOCATION DETAILED DESCRIPTION DERM: LOCATION DETAILED: RIGHT LATERAL DISTAL UPPER ARM

## 2024-09-18 ASSESSMENT — LOCATION ZONE DERM: LOCATION ZONE: ARM

## 2024-10-01 DIAGNOSIS — E03.9 HYPOTHYROIDISM, UNSPECIFIED TYPE: ICD-10-CM

## 2024-10-01 RX ORDER — LEVOTHYROXINE SODIUM 75 UG/1
75 TABLET ORAL DAILY
Qty: 30 TABLET | Refills: 0 | Status: SHIPPED | OUTPATIENT
Start: 2024-10-01

## 2024-10-10 DIAGNOSIS — E03.9 HYPOTHYROIDISM, UNSPECIFIED TYPE: ICD-10-CM

## 2024-10-10 LAB — TSH W FREE THYROID IF ABNORMAL: 0.97 UIU/ML (ref 0.27–4.2)

## 2024-10-27 DIAGNOSIS — E03.9 HYPOTHYROIDISM, UNSPECIFIED TYPE: ICD-10-CM

## 2024-10-28 RX ORDER — LEVOTHYROXINE SODIUM 75 UG/1
75 TABLET ORAL DAILY
Qty: 90 TABLET | Refills: 1 | Status: SHIPPED | OUTPATIENT
Start: 2024-10-28

## 2024-11-14 NOTE — HPI: SKIN LESION
154.3
How Severe Is Your Skin Lesion?: mild
Is This A New Presentation, Or A Follow-Up?: Skin Lesions
Additional History: Patient denies anything new or changing that she’s aware of.
breastfeeding exclusively

## 2025-01-06 DIAGNOSIS — E03.9 HYPOTHYROIDISM, UNSPECIFIED TYPE: Primary | ICD-10-CM

## 2025-01-06 DIAGNOSIS — E78.5 HYPERLIPIDEMIA, UNSPECIFIED HYPERLIPIDEMIA TYPE: ICD-10-CM

## 2025-01-06 DIAGNOSIS — E55.9 VITAMIN D DEFICIENCY: ICD-10-CM

## 2025-01-06 DIAGNOSIS — Z00.00 GENERAL MEDICAL EXAMINATION: ICD-10-CM

## 2025-01-28 DIAGNOSIS — E03.9 HYPOTHYROIDISM, UNSPECIFIED TYPE: ICD-10-CM

## 2025-01-28 DIAGNOSIS — Z00.00 GENERAL MEDICAL EXAMINATION: ICD-10-CM

## 2025-01-28 DIAGNOSIS — E78.5 HYPERLIPIDEMIA, UNSPECIFIED HYPERLIPIDEMIA TYPE: ICD-10-CM

## 2025-01-28 DIAGNOSIS — E55.9 VITAMIN D DEFICIENCY: ICD-10-CM

## 2025-01-28 LAB
25(OH)D3 SERPL-MCNC: 85.7 NG/ML (ref 30–100)
ALBUMIN SERPL-MCNC: 4.1 G/DL (ref 3.2–4.6)
ALBUMIN/GLOB SERPL: 1.7 (ref 1–1.9)
ALP SERPL-CCNC: 75 U/L (ref 35–104)
ALT SERPL-CCNC: 22 U/L (ref 8–45)
ANION GAP SERPL CALC-SCNC: 11 MMOL/L (ref 7–16)
AST SERPL-CCNC: 42 U/L (ref 15–37)
BASOPHILS # BLD: 0.06 K/UL (ref 0–0.2)
BASOPHILS NFR BLD: 1.3 % (ref 0–2)
BILIRUB SERPL-MCNC: 0.8 MG/DL (ref 0–1.2)
BUN SERPL-MCNC: 16 MG/DL (ref 8–23)
CALCIUM SERPL-MCNC: 10 MG/DL (ref 8.8–10.2)
CHLORIDE SERPL-SCNC: 102 MMOL/L (ref 98–107)
CHOLEST SERPL-MCNC: 195 MG/DL (ref 0–200)
CO2 SERPL-SCNC: 26 MMOL/L (ref 20–29)
CREAT SERPL-MCNC: 1.1 MG/DL (ref 0.6–1.1)
DIFFERENTIAL METHOD BLD: ABNORMAL
EOSINOPHIL # BLD: 0.25 K/UL (ref 0–0.8)
EOSINOPHIL NFR BLD: 5.2 % (ref 0.5–7.8)
ERYTHROCYTE [DISTWIDTH] IN BLOOD BY AUTOMATED COUNT: 12.7 % (ref 11.9–14.6)
GLOBULIN SER CALC-MCNC: 2.4 G/DL (ref 2.3–3.5)
GLUCOSE SERPL-MCNC: 91 MG/DL (ref 70–99)
HCT VFR BLD AUTO: 34.4 % (ref 35.8–46.3)
HDLC SERPL-MCNC: 93 MG/DL (ref 40–60)
HDLC SERPL: 2.1 (ref 0–5)
HGB BLD-MCNC: 11.6 G/DL (ref 11.7–15.4)
IMM GRANULOCYTES # BLD AUTO: 0.01 K/UL (ref 0–0.5)
IMM GRANULOCYTES NFR BLD AUTO: 0.2 % (ref 0–5)
LDLC SERPL CALC-MCNC: 93 MG/DL (ref 0–100)
LYMPHOCYTES # BLD: 1.69 K/UL (ref 0.5–4.6)
LYMPHOCYTES NFR BLD: 35.4 % (ref 13–44)
MCH RBC QN AUTO: 30.3 PG (ref 26.1–32.9)
MCHC RBC AUTO-ENTMCNC: 33.7 G/DL (ref 31.4–35)
MCV RBC AUTO: 89.8 FL (ref 82–102)
MONOCYTES # BLD: 0.56 K/UL (ref 0.1–1.3)
MONOCYTES NFR BLD: 11.7 % (ref 4–12)
NEUTS SEG # BLD: 2.2 K/UL (ref 1.7–8.2)
NEUTS SEG NFR BLD: 46.2 % (ref 43–78)
NRBC # BLD: 0 K/UL (ref 0–0.2)
PLATELET # BLD AUTO: 243 K/UL (ref 150–450)
PMV BLD AUTO: 10.7 FL (ref 9.4–12.3)
POTASSIUM SERPL-SCNC: 4.2 MMOL/L (ref 3.5–5.1)
PROT SERPL-MCNC: 6.6 G/DL (ref 6.3–8.2)
RBC # BLD AUTO: 3.83 M/UL (ref 4.05–5.2)
SODIUM SERPL-SCNC: 139 MMOL/L (ref 136–145)
TRIGL SERPL-MCNC: 47 MG/DL (ref 0–150)
TSH W FREE THYROID IF ABNORMAL: 1.12 UIU/ML (ref 0.27–4.2)
VLDLC SERPL CALC-MCNC: 9 MG/DL (ref 6–23)
WBC # BLD AUTO: 4.8 K/UL (ref 4.3–11.1)

## 2025-01-30 SDOH — HEALTH STABILITY: PHYSICAL HEALTH: ON AVERAGE, HOW MANY MINUTES DO YOU ENGAGE IN EXERCISE AT THIS LEVEL?: 20 MIN

## 2025-01-30 SDOH — HEALTH STABILITY: PHYSICAL HEALTH: ON AVERAGE, HOW MANY DAYS PER WEEK DO YOU ENGAGE IN MODERATE TO STRENUOUS EXERCISE (LIKE A BRISK WALK)?: 3 DAYS

## 2025-01-30 ASSESSMENT — PATIENT HEALTH QUESTIONNAIRE - PHQ9
1. LITTLE INTEREST OR PLEASURE IN DOING THINGS: NOT AT ALL
SUM OF ALL RESPONSES TO PHQ9 QUESTIONS 1 & 2: 0
SUM OF ALL RESPONSES TO PHQ QUESTIONS 1-9: 0
2. FEELING DOWN, DEPRESSED OR HOPELESS: NOT AT ALL
SUM OF ALL RESPONSES TO PHQ QUESTIONS 1-9: 0

## 2025-01-30 ASSESSMENT — LIFESTYLE VARIABLES
HOW OFTEN DO YOU HAVE A DRINK CONTAINING ALCOHOL: 2
HOW OFTEN DO YOU HAVE A DRINK CONTAINING ALCOHOL: MONTHLY OR LESS
HOW MANY STANDARD DRINKS CONTAINING ALCOHOL DO YOU HAVE ON A TYPICAL DAY: 1 OR 2
HOW MANY STANDARD DRINKS CONTAINING ALCOHOL DO YOU HAVE ON A TYPICAL DAY: 1
HOW OFTEN DO YOU HAVE SIX OR MORE DRINKS ON ONE OCCASION: 1

## 2025-01-31 SDOH — ECONOMIC STABILITY: FOOD INSECURITY: WITHIN THE PAST 12 MONTHS, THE FOOD YOU BOUGHT JUST DIDN'T LAST AND YOU DIDN'T HAVE MONEY TO GET MORE.: NEVER TRUE

## 2025-01-31 SDOH — ECONOMIC STABILITY: INCOME INSECURITY: IN THE LAST 12 MONTHS, WAS THERE A TIME WHEN YOU WERE NOT ABLE TO PAY THE MORTGAGE OR RENT ON TIME?: NO

## 2025-01-31 SDOH — ECONOMIC STABILITY: FOOD INSECURITY: WITHIN THE PAST 12 MONTHS, YOU WORRIED THAT YOUR FOOD WOULD RUN OUT BEFORE YOU GOT MONEY TO BUY MORE.: NEVER TRUE

## 2025-01-31 SDOH — ECONOMIC STABILITY: TRANSPORTATION INSECURITY
IN THE PAST 12 MONTHS, HAS THE LACK OF TRANSPORTATION KEPT YOU FROM MEDICAL APPOINTMENTS OR FROM GETTING MEDICATIONS?: NO

## 2025-02-03 ENCOUNTER — OFFICE VISIT (OUTPATIENT)
Dept: INTERNAL MEDICINE CLINIC | Facility: CLINIC | Age: 75
End: 2025-02-03
Payer: MEDICARE

## 2025-02-03 VITALS
RESPIRATION RATE: 18 BRPM | HEIGHT: 65 IN | HEART RATE: 79 BPM | SYSTOLIC BLOOD PRESSURE: 154 MMHG | WEIGHT: 110 LBS | OXYGEN SATURATION: 100 % | BODY MASS INDEX: 18.33 KG/M2 | TEMPERATURE: 98 F | DIASTOLIC BLOOD PRESSURE: 90 MMHG

## 2025-02-03 DIAGNOSIS — Z13.1 DIABETES MELLITUS SCREENING: ICD-10-CM

## 2025-02-03 DIAGNOSIS — R74.8 ELEVATED LIVER ENZYMES: ICD-10-CM

## 2025-02-03 DIAGNOSIS — I10 PRIMARY HYPERTENSION: ICD-10-CM

## 2025-02-03 DIAGNOSIS — E78.5 HYPERLIPIDEMIA, UNSPECIFIED HYPERLIPIDEMIA TYPE: ICD-10-CM

## 2025-02-03 DIAGNOSIS — Z12.31 ENCOUNTER FOR SCREENING MAMMOGRAM FOR BREAST CANCER: ICD-10-CM

## 2025-02-03 DIAGNOSIS — D64.9 ANEMIA, UNSPECIFIED TYPE: ICD-10-CM

## 2025-02-03 DIAGNOSIS — E03.9 HYPOTHYROIDISM, UNSPECIFIED TYPE: ICD-10-CM

## 2025-02-03 DIAGNOSIS — H93.13 TINNITUS OF BOTH EARS: ICD-10-CM

## 2025-02-03 DIAGNOSIS — G47.33 OSA ON CPAP: ICD-10-CM

## 2025-02-03 DIAGNOSIS — G89.29 CHRONIC RIGHT SHOULDER PAIN: ICD-10-CM

## 2025-02-03 DIAGNOSIS — Z96.641 STATUS POST RIGHT HIP REPLACEMENT: ICD-10-CM

## 2025-02-03 DIAGNOSIS — Z23 NEED FOR SHINGLES VACCINE: ICD-10-CM

## 2025-02-03 DIAGNOSIS — M25.511 CHRONIC RIGHT SHOULDER PAIN: ICD-10-CM

## 2025-02-03 DIAGNOSIS — Z00.00 INITIAL MEDICARE ANNUAL WELLNESS VISIT: Primary | ICD-10-CM

## 2025-02-03 DIAGNOSIS — R09.82 PND (POST-NASAL DRIP): ICD-10-CM

## 2025-02-03 LAB
EST. AVERAGE GLUCOSE BLD GHB EST-MCNC: 119 MG/DL
FERRITIN SERPL-MCNC: 105 NG/ML (ref 8–388)
HBA1C MFR BLD: 5.8 % (ref 0–5.6)
IRON SATN MFR SERPL: 25 % (ref 20–50)
IRON SERPL-MCNC: 87 UG/DL (ref 35–100)
TIBC SERPL-MCNC: 350 UG/DL (ref 240–450)
UIBC SERPL-MCNC: 263 UG/DL (ref 112–347)
VIT B12 SERPL-MCNC: 1364 PG/ML (ref 193–986)

## 2025-02-03 PROCEDURE — G8399 PT W/DXA RESULTS DOCUMENT: HCPCS | Performed by: NURSE PRACTITIONER

## 2025-02-03 PROCEDURE — 3079F DIAST BP 80-89 MM HG: CPT | Performed by: NURSE PRACTITIONER

## 2025-02-03 PROCEDURE — 3077F SYST BP >= 140 MM HG: CPT | Performed by: NURSE PRACTITIONER

## 2025-02-03 PROCEDURE — 1123F ACP DISCUSS/DSCN MKR DOCD: CPT | Performed by: NURSE PRACTITIONER

## 2025-02-03 PROCEDURE — 1036F TOBACCO NON-USER: CPT | Performed by: NURSE PRACTITIONER

## 2025-02-03 PROCEDURE — 3017F COLORECTAL CA SCREEN DOC REV: CPT | Performed by: NURSE PRACTITIONER

## 2025-02-03 PROCEDURE — 1090F PRES/ABSN URINE INCON ASSESS: CPT | Performed by: NURSE PRACTITIONER

## 2025-02-03 PROCEDURE — 99214 OFFICE O/P EST MOD 30 MIN: CPT | Performed by: NURSE PRACTITIONER

## 2025-02-03 PROCEDURE — G8427 DOCREV CUR MEDS BY ELIG CLIN: HCPCS | Performed by: NURSE PRACTITIONER

## 2025-02-03 PROCEDURE — G0438 PPPS, INITIAL VISIT: HCPCS | Performed by: NURSE PRACTITIONER

## 2025-02-03 PROCEDURE — 1159F MED LIST DOCD IN RCRD: CPT | Performed by: NURSE PRACTITIONER

## 2025-02-03 PROCEDURE — G8419 CALC BMI OUT NRM PARAM NOF/U: HCPCS | Performed by: NURSE PRACTITIONER

## 2025-02-03 RX ORDER — LEVOTHYROXINE SODIUM 75 UG/1
75 TABLET ORAL DAILY
Qty: 90 TABLET | Refills: 1 | Status: SHIPPED | OUTPATIENT
Start: 2025-02-03

## 2025-02-03 RX ORDER — AMOXICILLIN 500 MG/1
500 CAPSULE ORAL 3 TIMES DAILY
COMMUNITY
End: 2025-02-03 | Stop reason: SDUPTHER

## 2025-02-03 RX ORDER — ZOSTER VACCINE RECOMBINANT, ADJUVANTED 50 MCG/0.5
0.5 KIT INTRAMUSCULAR SEE ADMIN INSTRUCTIONS
Qty: 0.5 ML | Refills: 0 | Status: SHIPPED | OUTPATIENT
Start: 2025-02-03 | End: 2025-08-02

## 2025-02-03 RX ORDER — AMOXICILLIN 500 MG/1
500 CAPSULE ORAL SEE ADMIN INSTRUCTIONS
Qty: 30 CAPSULE | Refills: 0 | Status: SHIPPED | OUTPATIENT
Start: 2025-02-03

## 2025-02-03 RX ORDER — FLUOROURACIL 0.04 G/G
CREAM TOPICAL
COMMUNITY

## 2025-02-03 RX ORDER — ROSUVASTATIN CALCIUM 5 MG/1
5 TABLET, COATED ORAL EVERY OTHER DAY
Qty: 30 TABLET | Refills: 3 | Status: SHIPPED | OUTPATIENT
Start: 2025-02-03

## 2025-02-03 RX ORDER — HYDROCHLOROTHIAZIDE 25 MG/1
12.5 TABLET ORAL EVERY MORNING
Qty: 90 TABLET | Refills: 1 | Status: SHIPPED | OUTPATIENT
Start: 2025-02-03

## 2025-02-03 ASSESSMENT — ENCOUNTER SYMPTOMS
ABDOMINAL PAIN: 0
SHORTNESS OF BREATH: 0
VOMITING: 0
NAUSEA: 0

## 2025-02-03 NOTE — PROGRESS NOTES
Medicare Annual Wellness Visit    Kalee Farmer is here for Medicare AWV (Initial ), Shoulder Pain (Patient c/o worsening R shoulder pain, would like to discuss referral to Ortho.  Patient states she took Diclofenac once which seemed to help, wants to make sure it is ok for her to take. ), Results (Patient would also like to discuss fatty liver), and Tinnitus (Patient also c/o tinnitus x 4-5 years )    Assessment & Plan   Anemia, unspecified type  Diabetes mellitus screening  Initial Medicare annual wellness visit       Return in 6 months (on 8/3/2025).     Subjective       Patient's complete Health Risk Assessment and screening values have been reviewed and are found in Flowsheets. The following problems were reviewed today and where indicated follow up appointments were made and/or referrals ordered.    Positive Risk Factor Screenings with Interventions:             General HRA Questions:  Select all that apply: (!) Stress  Interventions - Stress:  See AVS for additional education material        Abnormal BMI (underweight):  There is no height or weight on file to calculate BMI. (!) Abnormal  Interventions:  See AVS for additional education material     Hearing Screen:  Do you or your family notice any trouble with your hearing that hasn't been managed with hearing aids?: (!) Yes    Interventions:  See AVS for additional education material                     Objective   There were no vitals filed for this visit.   There is no height or weight on file to calculate BMI.                    Allergies   Allergen Reactions    Lisinopril Other (See Comments)     Dizzy     Estradiol Other (See Comments)     Didn't work      Prior to Visit Medications    Medication Sig Taking? Authorizing Provider   levothyroxine (SYNTHROID) 75 MCG tablet TAKE ONE TABLET BY MOUTH ONE TIME DAILY  Olimpia Calvillo, APRN - CNP   calcium carbonate 600 MG TABS tablet Take 2 tablets by mouth daily  Provider, MD Catalino

## 2025-02-03 NOTE — PATIENT INSTRUCTIONS

## 2025-02-11 ENCOUNTER — OFFICE VISIT (OUTPATIENT)
Dept: ORTHOPEDIC SURGERY | Age: 75
End: 2025-02-11
Payer: MEDICARE

## 2025-02-11 VITALS — HEIGHT: 65 IN | BODY MASS INDEX: 18.33 KG/M2 | WEIGHT: 110 LBS

## 2025-02-11 DIAGNOSIS — M25.511 CHRONIC RIGHT SHOULDER PAIN: ICD-10-CM

## 2025-02-11 DIAGNOSIS — M19.011 ARTHRITIS OF RIGHT SHOULDER REGION: ICD-10-CM

## 2025-02-11 DIAGNOSIS — M25.511 RIGHT SHOULDER PAIN, UNSPECIFIED CHRONICITY: Primary | ICD-10-CM

## 2025-02-11 DIAGNOSIS — G89.29 CHRONIC RIGHT SHOULDER PAIN: ICD-10-CM

## 2025-02-11 PROCEDURE — 1159F MED LIST DOCD IN RCRD: CPT | Performed by: STUDENT IN AN ORGANIZED HEALTH CARE EDUCATION/TRAINING PROGRAM

## 2025-02-11 PROCEDURE — 1123F ACP DISCUSS/DSCN MKR DOCD: CPT | Performed by: STUDENT IN AN ORGANIZED HEALTH CARE EDUCATION/TRAINING PROGRAM

## 2025-02-11 PROCEDURE — 1160F RVW MEDS BY RX/DR IN RCRD: CPT | Performed by: STUDENT IN AN ORGANIZED HEALTH CARE EDUCATION/TRAINING PROGRAM

## 2025-02-11 PROCEDURE — 99204 OFFICE O/P NEW MOD 45 MIN: CPT | Performed by: STUDENT IN AN ORGANIZED HEALTH CARE EDUCATION/TRAINING PROGRAM

## 2025-02-11 NOTE — PROGRESS NOTES
Name: Kalee Farmer  YOB: 1950  Gender: female  MRN: [id number]      CC: New Patient (Right shoulder pain)       HPI: Kalee Farmer is a 74 y.o. female who presents with New Patient (Right shoulder pain)    Patient had a proximal humerus fracture in 2020 and has had chronic pain since that time.  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  She has stiffness.  Denies weakness.  Her main concern is function with daily activities as she is limited with overhead and rotational movements.  She has tried over-the-counter anti-inflammatories.  She had done physical therapy after the injury years ago.  She is not ready to consider surgery yet but would like to establish with a new group here in [place] after moving from [place].    ROS/Meds/PSH/PMH/FH/SH: I personally reviewed the patients standard intake form.  Below are the pertinents    Tobacco:  reports that she quit smoking about 46 years ago. Her smoking use included cigarettes. She has a 1 pack-year smoking history. She has never used smokeless tobacco.  Pre-Diabetes: A1c 5.8 2/3/25    Physical Examination:  General: no acute distress  Lungs: breathing easily    R Shoulder Exam  No gross deformity.  AROM: flexion 80, abduction 90, ER 70, IR beltline  PROM: abduction 110  Resisted strength testing 5/5 with ER, IR and abduction   Empty can negative  Yergason and Speeds negative  Nontender LHB, AC, clavicle  SILT in axillary distribution and distally. Radial pulse 2+, WWP.  Neck TTP C7 ROM full        Imaging:   XR with AP, Grashey, and axillary views of R shoulder GH and AC OA, cortical irregularity at RC footprint., sequelae of old humerus fracture and AVN, no gross articular collapse.     All imaging interpreted by myself independent of radiology review        Assessment:     ICD-10-CM    1. Right shoulder pain, unspecified chronicity  M25.511 XR SHOULDER RIGHT (MIN 2 VIEWS)      2. Arthritis of right

## 2025-02-14 ENCOUNTER — TELEPHONE (OUTPATIENT)
Dept: INTERNAL MEDICINE CLINIC | Facility: CLINIC | Age: 75
End: 2025-02-14

## 2025-02-17 NOTE — TELEPHONE ENCOUNTER
I sent the message below via my chart, patient has not read the following message, please relay. Thanks!    Mrs. Farmer,  How is your blood pressure and tinnitus on the hctz?   Hoping better!  Here if you need anything~  Priscilla      [address]    MyChart User Last Read On   Kalee Farmer Not Read           
Patient read message and sent a reply on [facility], forwarded to Priscilla.  
done

## 2025-02-20 ENCOUNTER — APPOINTMENT (OUTPATIENT)
Dept: PHYSICAL THERAPY | Age: 75
End: 2025-02-20
Attending: STUDENT IN AN ORGANIZED HEALTH CARE EDUCATION/TRAINING PROGRAM
Payer: MEDICARE

## 2025-02-24 ENCOUNTER — OFFICE VISIT (OUTPATIENT)
Dept: INTERNAL MEDICINE CLINIC | Facility: CLINIC | Age: 75
End: 2025-02-24
Payer: MEDICARE

## 2025-02-24 VITALS
HEIGHT: 65 IN | TEMPERATURE: 98.1 F | OXYGEN SATURATION: 99 % | WEIGHT: 110.6 LBS | SYSTOLIC BLOOD PRESSURE: 140 MMHG | DIASTOLIC BLOOD PRESSURE: 68 MMHG | HEART RATE: 67 BPM | RESPIRATION RATE: 18 BRPM | BODY MASS INDEX: 18.43 KG/M2

## 2025-02-24 DIAGNOSIS — Z51.81 ENCOUNTER FOR MONITORING DIURETIC THERAPY: ICD-10-CM

## 2025-02-24 DIAGNOSIS — I10 PRIMARY HYPERTENSION: Primary | ICD-10-CM

## 2025-02-24 DIAGNOSIS — D50.9 IRON DEFICIENCY ANEMIA, UNSPECIFIED IRON DEFICIENCY ANEMIA TYPE: ICD-10-CM

## 2025-02-24 DIAGNOSIS — H93.13 TINNITUS OF BOTH EARS: ICD-10-CM

## 2025-02-24 DIAGNOSIS — Z79.899 ENCOUNTER FOR MONITORING DIURETIC THERAPY: ICD-10-CM

## 2025-02-24 DIAGNOSIS — G47.33 OSA ON CPAP: ICD-10-CM

## 2025-02-24 DIAGNOSIS — R91.1 LUNG NODULE SEEN ON IMAGING STUDY: ICD-10-CM

## 2025-02-24 LAB
ANION GAP SERPL CALC-SCNC: 10 MMOL/L (ref 7–16)
BASOPHILS # BLD: 0.07 K/UL (ref 0–0.2)
BASOPHILS NFR BLD: 1.4 % (ref 0–2)
BUN SERPL-MCNC: 13 MG/DL (ref 8–23)
CALCIUM SERPL-MCNC: 9.3 MG/DL (ref 8.8–10.2)
CHLORIDE SERPL-SCNC: 99 MMOL/L (ref 98–107)
CO2 SERPL-SCNC: 28 MMOL/L (ref 20–29)
CREAT SERPL-MCNC: 0.84 MG/DL (ref 0.6–1.1)
DIFFERENTIAL METHOD BLD: ABNORMAL
EOSINOPHIL # BLD: 0.24 K/UL (ref 0–0.8)
EOSINOPHIL NFR BLD: 4.7 % (ref 0.5–7.8)
ERYTHROCYTE [DISTWIDTH] IN BLOOD BY AUTOMATED COUNT: 12.6 % (ref 11.9–14.6)
GLUCOSE SERPL-MCNC: 102 MG/DL (ref 70–99)
HCT VFR BLD AUTO: 35.3 % (ref 35.8–46.3)
HGB BLD-MCNC: 11.5 G/DL (ref 11.7–15.4)
IMM GRANULOCYTES # BLD AUTO: 0.01 K/UL (ref 0–0.5)
IMM GRANULOCYTES NFR BLD AUTO: 0.2 % (ref 0–5)
LYMPHOCYTES # BLD: 1.41 K/UL (ref 0.5–4.6)
LYMPHOCYTES NFR BLD: 27.4 % (ref 13–44)
MCH RBC QN AUTO: 29.6 PG (ref 26.1–32.9)
MCHC RBC AUTO-ENTMCNC: 32.6 G/DL (ref 31.4–35)
MCV RBC AUTO: 91 FL (ref 82–102)
MONOCYTES # BLD: 0.54 K/UL (ref 0.1–1.3)
MONOCYTES NFR BLD: 10.5 % (ref 4–12)
NEUTS SEG # BLD: 2.87 K/UL (ref 1.7–8.2)
NEUTS SEG NFR BLD: 55.8 % (ref 43–78)
NRBC # BLD: 0 K/UL (ref 0–0.2)
PLATELET # BLD AUTO: 248 K/UL (ref 150–450)
PMV BLD AUTO: 10.3 FL (ref 9.4–12.3)
POTASSIUM SERPL-SCNC: 3.9 MMOL/L (ref 3.5–5.1)
RBC # BLD AUTO: 3.88 M/UL (ref 4.05–5.2)
SODIUM SERPL-SCNC: 136 MMOL/L (ref 136–145)
WBC # BLD AUTO: 5.1 K/UL (ref 4.3–11.1)

## 2025-02-24 PROCEDURE — 1036F TOBACCO NON-USER: CPT | Performed by: NURSE PRACTITIONER

## 2025-02-24 PROCEDURE — G8399 PT W/DXA RESULTS DOCUMENT: HCPCS | Performed by: NURSE PRACTITIONER

## 2025-02-24 PROCEDURE — 1123F ACP DISCUSS/DSCN MKR DOCD: CPT | Performed by: NURSE PRACTITIONER

## 2025-02-24 PROCEDURE — G8419 CALC BMI OUT NRM PARAM NOF/U: HCPCS | Performed by: NURSE PRACTITIONER

## 2025-02-24 PROCEDURE — 99214 OFFICE O/P EST MOD 30 MIN: CPT | Performed by: NURSE PRACTITIONER

## 2025-02-24 PROCEDURE — G8427 DOCREV CUR MEDS BY ELIG CLIN: HCPCS | Performed by: NURSE PRACTITIONER

## 2025-02-24 PROCEDURE — 3017F COLORECTAL CA SCREEN DOC REV: CPT | Performed by: NURSE PRACTITIONER

## 2025-02-24 PROCEDURE — 3077F SYST BP >= 140 MM HG: CPT | Performed by: NURSE PRACTITIONER

## 2025-02-24 PROCEDURE — 1159F MED LIST DOCD IN RCRD: CPT | Performed by: NURSE PRACTITIONER

## 2025-02-24 PROCEDURE — 3078F DIAST BP <80 MM HG: CPT | Performed by: NURSE PRACTITIONER

## 2025-02-24 PROCEDURE — 1090F PRES/ABSN URINE INCON ASSESS: CPT | Performed by: NURSE PRACTITIONER

## 2025-02-24 PROCEDURE — 1160F RVW MEDS BY RX/DR IN RCRD: CPT | Performed by: NURSE PRACTITIONER

## 2025-02-24 ASSESSMENT — ENCOUNTER SYMPTOMS
SHORTNESS OF BREATH: 0
TROUBLE SWALLOWING: 0
BLURRED VISION: 0

## 2025-02-24 NOTE — PROGRESS NOTES
2/24/2025 10:05 AM  Location:[facility] PHYSICIAN SERVICES  [facility] INTERNAL MEDICINE  [place]  Patient #:  [id number]  YOB: 1950        History of Present Illness     Chief Complaint   Patient presents with    Hypertension     Patient presents in the office today for a 2 week follow up on hypertension    Discuss Labs     Discuss lab results, recheck electrolytes        Ms. Farmer is a 74 y.o. female  who presents for follow-up.  Mrs. Farmer was seen at the end of January and had hypertension and tinnitus.  For this, hydrochlorothiazide was started.  Reports years of bilateral tinnitus which has not improved by starting hydrochlorothiazide.  She had hearing testing within the last 2 years and was told she did not need hearing aids.  Reports no ear pain but has a constant hum in both of her ears all the time, sometimes this wakes her up from sleep.  She reports that hydrochlorothiazide has helped her blood pressures and brings in a list of readings mostly in the 1 teens to 130 range.  She did have 1 episode of feeling poorly and lightheaded were associated with a higher blood pressure.  Denies any stroke symptoms.  Reports recently some right-sided neck pain with turning her neck.  Denies lymphadenopathy, fevers or chills, nausea or vomiting.      Hypertension  This is a chronic problem. The problem has been gradually improving since onset. Associated symptoms include neck pain. Pertinent negatives include no anxiety, blurred vision, chest pain, headaches, malaise/fatigue, palpitations, peripheral edema or shortness of breath. The current treatment provides moderate improvement.          No Known Allergies  Past Medical History:   Diagnosis Date    Arthritis     Fractured hip (HCC) 11/2020    right    Fractured shoulder 11/2020    right    Hearing loss     Hyperlipidemia     Hypothyroidism     Osteoarthritis     Sleep apnea     C-pap nightly      Social History     Socioeconomic History

## 2025-02-25 ENCOUNTER — HOSPITAL ENCOUNTER (OUTPATIENT)
Dept: PHYSICAL THERAPY | Age: 75
Setting detail: RECURRING SERIES
Discharge: HOME | End: 2025-02-28
Attending: STUDENT IN AN ORGANIZED HEALTH CARE EDUCATION/TRAINING PROGRAM
Payer: MEDICARE

## 2025-02-25 DIAGNOSIS — G89.29 CHRONIC RIGHT SHOULDER PAIN: Primary | ICD-10-CM

## 2025-02-25 DIAGNOSIS — M25.511 CHRONIC RIGHT SHOULDER PAIN: Primary | ICD-10-CM

## 2025-02-25 DIAGNOSIS — M25.611 DECREASED RIGHT SHOULDER RANGE OF MOTION: ICD-10-CM

## 2025-02-25 PROCEDURE — 97110 THERAPEUTIC EXERCISES: CPT

## 2025-02-25 PROCEDURE — 97162 PT EVAL MOD COMPLEX 30 MIN: CPT

## 2025-02-25 NOTE — THERAPY EVALUATION
Kalee Farmer  : 1950  Primary: Medicare Part A And B (Medicare)  Secondary: [facility] [place]  [place] [address]  Phone: [phone]  Fax: [phone] Plan Frequency: 2x  Plan of Care/Certification Expiration Date: 25        Plan of Care/Certification Expiration Date:  Plan of Care/Certification Expiration Date: 25    Frequency/Duration: Plan Frequency: 2x      Time In/Out:   Time In: 1530  Time Out: 1615      PT Visit Info:         Visit Count:  1                OUTPATIENT PHYSICAL THERAPY:             Initial Assessment 2025               Episode (R shoulder pain )         Treatment Diagnosis:     Chronic right shoulder pain  Decreased right shoulder range of motion  Medical/Referring Diagnosis:    Right shoulder pain, unspecified chronicity  Arthritis of right shoulder region  Chronic right shoulder pain      Referring Physician:  Diego Qureshi MD MD Orders:  PT Eval and Treat   Return MD Appt:  TBD  Date of Onset:   onset 2020  Allergies:  Patient has no known allergies.  Restrictions/Precautions:    None      Medications Last Reviewed: 2025     SUBJECTIVE   History of Injury/Illness (Reason for Referral):  Patient reports to the clinic with R shoulder pain. Patient states that her R shoulder pain started when she broke her superior humerus in 2020. She was in a sling and then performed PT. Patient states that she has had several bouts of physical therapy since her injury. 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 Patient states occasional numbness over R middle finger and sometimes in her neck. Patient states that she has issues with most ADLs including washing her back, doing her hair, reaching stuff off the shelves, and doing gardening.  has to help her with most of her ADLs   Patient Stated Goal(s):  \"Be able to wash her back,

## 2025-02-25 NOTE — PROGRESS NOTES
Kalee Farmer  : 1950  Primary: Medicare Part A And B (Medicare)  [facility] [place]  [address] [place] [address]  Phone: [phone]  Fax: [phone] Plan Frequency: 2x    Plan of Care/Certification Expiration Date: 25        Plan of Care/Certification Expiration Date:  Plan of Care/Certification Expiration Date: 25    Frequency/Duration:   Plan Frequency: 2x      Time In/Out:   Time In: 1530  Time Out: 1615      PT Visit Info:         Visit Count:  1    OUTPATIENT PHYSICAL THERAPY:   Treatment Note 2025       Episode  (R shoulder pain )               Treatment Diagnosis:    Chronic right shoulder pain  Decreased right shoulder range of motion  Medical/Referring Diagnosis:    Right shoulder pain, unspecified chronicity  Arthritis of right shoulder region  Chronic right shoulder pain      Referring Physician:  Diego Qureshi MD MD Orders:  PT Eval and Treat   Return MD Appt:  TBD   Date of Onset:  No data recorded   Allergies:   Patient has no known allergies.  Restrictions/Precautions:   None      Interventions Planned (Treatment may consist of any combination of the following):     See Assessment Note    Subjective Comments:   See intitial evaluation from today for comments  Initial Pain Level:     0 /10  Post Session Pain Level:      0 /10  Medications Last Reviewed: 2025  Updated Objective Findings:  See Evaluation Note from today  Treatment   TREATMENT:   THERAPEUTIC ACTIVITY: ( see below for minutes): Therapeutic activities per grid below to improve mobility, strength, balance and coordination. Required minimal visual, verbal, manual and tactile cues to improve independence and safety with daily activities .  THERAPEUTIC EXERCISE: (see below for minutes): Exercises per grid below to improve mobility, strength, balance and coordination. Required minimal verbal and manual cues to promote proper body alignment, promote

## 2025-02-27 ENCOUNTER — HOSPITAL ENCOUNTER (OUTPATIENT)
Dept: PHYSICAL THERAPY | Age: 75
Setting detail: RECURRING SERIES
End: 2025-02-27
Attending: STUDENT IN AN ORGANIZED HEALTH CARE EDUCATION/TRAINING PROGRAM
Payer: MEDICARE

## 2025-02-27 ENCOUNTER — HOSPITAL ENCOUNTER (OUTPATIENT)
Dept: CT IMAGING | Age: 75
Discharge: HOME | End: 2025-03-01
Payer: MEDICARE

## 2025-02-27 DIAGNOSIS — M25.511 CHRONIC RIGHT SHOULDER PAIN: Primary | ICD-10-CM

## 2025-02-27 DIAGNOSIS — R91.1 LUNG NODULE SEEN ON IMAGING STUDY: ICD-10-CM

## 2025-02-27 DIAGNOSIS — M25.611 DECREASED RIGHT SHOULDER RANGE OF MOTION: ICD-10-CM

## 2025-02-27 DIAGNOSIS — G89.29 CHRONIC RIGHT SHOULDER PAIN: Primary | ICD-10-CM

## 2025-02-27 PROCEDURE — 97140 MANUAL THERAPY 1/> REGIONS: CPT

## 2025-02-27 PROCEDURE — 97110 THERAPEUTIC EXERCISES: CPT

## 2025-02-27 PROCEDURE — 71250 CT THORAX DX C-: CPT

## 2025-02-27 NOTE — PROGRESS NOTES
Kalee Farmer  : 1950  Primary: Medicare Part A And B (Medicare)  [facility] [place] [address] [address] [place] [address]  Phone: [phone]  Fax: [phone] Plan Frequency: 2x    Plan of Care/Certification Expiration Date: 25        Plan of Care/Certification Expiration Date:  Plan of Care/Certification Expiration Date: 25    Frequency/Duration:   Plan Frequency: 2x      Time In/Out:   Time In: 1530  Time Out: 1610      PT Visit Info:         Visit Count:  2    OUTPATIENT PHYSICAL THERAPY:   Treatment Note 2025       Episode  (R shoulder pain )               Treatment Diagnosis:    Chronic right shoulder pain  Decreased right shoulder range of motion  Medical/Referring Diagnosis:    Right shoulder pain, unspecified chronicity  Arthritis of right shoulder region  Chronic right shoulder pain      Referring Physician:  Diego Qureshi MD MD Orders:  PT Eval and Treat   Return MD Appt:  TBD   Date of Onset:  No data recorded   Allergies:   Patient has no known allergies.  Restrictions/Precautions:   None      Interventions Planned (Treatment may consist of any combination of the following):     See Assessment Note    Subjective Comments:   Patient states that there is some clicking and popping when going overhead. No pain   Initial Pain Level:     0 /10  Post Session Pain Level:      0 /10  Medications Last Reviewed: 2025  Updated Objective Findings:  None Today  Treatment   TREATMENT:   THERAPEUTIC ACTIVITY: ( see below for minutes): Therapeutic activities per grid below to improve mobility, strength, balance and coordination. Required minimal visual, verbal, manual and tactile cues to improve independence and safety with daily activities .  THERAPEUTIC EXERCISE: (see below for minutes): Exercises per grid below to improve mobility, strength, balance and coordination. Required minimal verbal and manual cues to promote proper body      Future Appointments   Date Time Provider Department Center   3/4/2025  8:45 AM CINDY PRN [facility]   3/6/2025  8:45 AM CINDY PRN [facility]   3/11/2025  9:30 AM CINDY PRN [facility]   3/13/2025  9:30 AM CINDY PRN [facility]   3/18/2025  3:30 PM CINDY PRN [facility]   3/20/2025  3:30 PM CINDY PRN [facility]   3/24/2025  8:45 AM CINDY PRN [facility]   3/27/2025  3:30 PM CINDY PRN [facility]   4/9/2025 10:30 AM ELVIA Dhillon MD [facility]   5/16/2025  1:15 PM Pilo Hay, APRN - CNP [facility] AMB   6/4/2025 10:00 AM Beena Quintero, DO [facility] AM[facility]   6/4/2025 10:00 AM Miguelina Johnson AuD [facility] AM[facility]   8/4/2025  9:20 AM Olimpia Calvillo, APRN - CNP [facility] ECC DEP

## 2025-03-04 ENCOUNTER — HOSPITAL ENCOUNTER (OUTPATIENT)
Dept: PHYSICAL THERAPY | Age: 75
Setting detail: RECURRING SERIES
Discharge: HOME | End: 2025-03-07
Attending: STUDENT IN AN ORGANIZED HEALTH CARE EDUCATION/TRAINING PROGRAM
Payer: MEDICARE

## 2025-03-04 DIAGNOSIS — G89.29 CHRONIC RIGHT SHOULDER PAIN: Primary | ICD-10-CM

## 2025-03-04 DIAGNOSIS — M25.611 DECREASED RIGHT SHOULDER RANGE OF MOTION: ICD-10-CM

## 2025-03-04 DIAGNOSIS — R91.1 LUNG NODULE SEEN ON IMAGING STUDY: Primary | ICD-10-CM

## 2025-03-04 DIAGNOSIS — M25.511 CHRONIC RIGHT SHOULDER PAIN: Primary | ICD-10-CM

## 2025-03-04 PROCEDURE — 97140 MANUAL THERAPY 1/> REGIONS: CPT

## 2025-03-04 PROCEDURE — 97110 THERAPEUTIC EXERCISES: CPT

## 2025-03-04 NOTE — PROGRESS NOTES
None  Recommendations/Intent for next treatment session: Next visit will continue to focus on returning to prior level of function     >Total Treatment Billable Duration:  35 minutes   Time In: 0846  Time Out: 0921     Stephen Delgadillo PT         Charge Capture  Events  [employer] Portal  Appt Desk  Attendance Report     Future Appointments   Date Time Provider Department Center   3/6/2025  8:45 AM CINDY PRN [facility]   3/13/2025  9:30 AM CINDY PRN [facility]   3/18/2025  3:30 PM CINDY PRN [facility]   3/20/2025  3:30 PM CINDY PRN [facility]   3/24/2025  8:45 AM CINDY PRN [facility]   4/9/2025 10:30 AM ELVIA Dhillon MD [facility]   5/16/2025  1:15 PM Pilo Hay APRN - CNP [facility] AM[facility]   6/4/2025 10:00 AM Beena Quintero, DO [facility] AM[facility]   6/4/2025 10:00 AM Miguelina Johnson AuD [facility]   8/4/2025  9:20 AM Olimpia Calvillo APRN - CNP [facility] E[facility] DEP

## 2025-03-06 ENCOUNTER — HOSPITAL ENCOUNTER (OUTPATIENT)
Dept: PHYSICAL THERAPY | Age: 75
Setting detail: RECURRING SERIES
Discharge: HOME | End: 2025-03-09
Attending: STUDENT IN AN ORGANIZED HEALTH CARE EDUCATION/TRAINING PROGRAM
Payer: MEDICARE

## 2025-03-06 DIAGNOSIS — G89.29 CHRONIC RIGHT SHOULDER PAIN: Primary | ICD-10-CM

## 2025-03-06 DIAGNOSIS — M25.611 DECREASED RIGHT SHOULDER RANGE OF MOTION: ICD-10-CM

## 2025-03-06 DIAGNOSIS — M25.511 CHRONIC RIGHT SHOULDER PAIN: Primary | ICD-10-CM

## 2025-03-06 PROCEDURE — 97110 THERAPEUTIC EXERCISES: CPT

## 2025-03-06 PROCEDURE — 97140 MANUAL THERAPY 1/> REGIONS: CPT

## 2025-03-06 NOTE — PROGRESS NOTES
Kalee Farmer  : 1950  Primary: Medicare Part A And B (Medicare)  [facility] [place] [address] [address] [address]  Phone: [phone]  Fax: [phone] Plan Frequency: 2x    Plan of Care/Certification Expiration Date: 25        Plan of Care/Certification Expiration Date:  Plan of Care/Certification Expiration Date: 25    Frequency/Duration:   Plan Frequency: 2x      Time In/Out:   Time In: 0846  Time Out: 924      PT Visit Info:         Visit Count:  4    OUTPATIENT PHYSICAL THERAPY:   Treatment Note 3/6/2025       Episode  (R shoulder pain )               Treatment Diagnosis:    Chronic right shoulder pain  Decreased right shoulder range of motion  Medical/Referring Diagnosis:    Right shoulder pain, unspecified chronicity  Arthritis of right shoulder region  Chronic right shoulder pain      Referring Physician:  Diego Qureshi MD MD Orders:  PT Eval and Treat   Return MD Appt:  TBD   Date of Onset:  No data recorded   Allergies:   Patient has no known allergies.  Restrictions/Precautions:   None      Interventions Planned (Treatment may consist of any combination of the following):     See Assessment Note    Subjective Comments:   Patient states that she is starting to see some improvement   Initial Pain Level:     0 /10  Post Session Pain Level:      0 /10  Medications Last Reviewed: 3/6/2025  Updated Objective Findings:  None Today  Treatment   TREATMENT:   THERAPEUTIC ACTIVITY: ( see below for minutes): Therapeutic activities per grid below to improve mobility, strength, balance and coordination. Required minimal visual, verbal, manual and tactile cues to improve independence and safety with daily activities .  THERAPEUTIC EXERCISE: (see below for minutes): Exercises per grid below to improve mobility, strength, balance and coordination. Required minimal verbal and manual cues to promote proper body alignment, promote proper

## 2025-03-10 ENCOUNTER — RESULTS FOLLOW-UP (OUTPATIENT)
Dept: INTERNAL MEDICINE CLINIC | Facility: CLINIC | Age: 75
End: 2025-03-10

## 2025-03-10 LAB
HEMOCCULT STL QL: NEGATIVE
VALID INTERNAL CONTROL: YES

## 2025-03-11 ENCOUNTER — APPOINTMENT (OUTPATIENT)
Dept: PHYSICAL THERAPY | Age: 75
End: 2025-03-11
Attending: STUDENT IN AN ORGANIZED HEALTH CARE EDUCATION/TRAINING PROGRAM
Payer: MEDICARE

## 2025-03-13 ENCOUNTER — HOSPITAL ENCOUNTER (OUTPATIENT)
Dept: PHYSICAL THERAPY | Age: 75
Setting detail: RECURRING SERIES
Discharge: HOME | End: 2025-03-16
Attending: STUDENT IN AN ORGANIZED HEALTH CARE EDUCATION/TRAINING PROGRAM
Payer: MEDICARE

## 2025-03-13 DIAGNOSIS — G89.29 CHRONIC RIGHT SHOULDER PAIN: Primary | ICD-10-CM

## 2025-03-13 DIAGNOSIS — M25.611 DECREASED RIGHT SHOULDER RANGE OF MOTION: ICD-10-CM

## 2025-03-13 DIAGNOSIS — M25.511 CHRONIC RIGHT SHOULDER PAIN: Primary | ICD-10-CM

## 2025-03-13 PROCEDURE — 97110 THERAPEUTIC EXERCISES: CPT

## 2025-03-13 PROCEDURE — 97140 MANUAL THERAPY 1/> REGIONS: CPT

## 2025-03-13 NOTE — PROGRESS NOTES
min 28 min 28 min    AAROM with cane in Flexion, Abduction, ER x30 each AAROM same as last session AAROM same as last time  Scapular protraction supine #4 3x10 Scapular protraction supine #3 3x10      Scapular protractions #4 3x10  Scapular protraction supine #4 3x10  Sidelying ER #4 3x10 Sidelying ER #3 3x10      Sidelying ER #4 3x10 Side lying ER #4 3x10 Rows #15 4x10 Rows Black TB 3x10      Pulleys 4 x30 second Flexion and abduction  ER/IR cable #5 3x10  ER/IR Red TB 3x10        Wall slides red band up and side x5 each Wall slides red band up and side 2x30 sec each way        Isometric red bed no money x3 laps    Proprioceptive Activities:                                Manual Therapy:  15 min  15 min 10 min 15 min      PROM R shoulder to tolerance Flexion, abduction, ER, IR PROM shoulder to tolerance flexion, abduction, ER, IR PROM R shoulder to tolerance flexion, abduction, ER, IR PROM R shoulder to tolerance flexion, abduction, ER, IR         STM to infraspinatus and subscapularis, Scapular mobs In all directions   Functional Activities:                                            Treatment/Session Summary:    Treatment Assessment:   Patient tolerated tx well today. Patient stated relief with STM to rotator cuff. Moderate trigger points found in infraspinatus and subscapularis. Decreased weight today due to patients soreness and increased symptoms. Will progress patient next week depending on her symptoms.   Communication/Consultation:  None today  Equipment provided today:  None  Recommendations/Intent for next treatment session: Next visit continue with progression to prior level of function.   >Total Treatment Billable Duration:  43 minutes   Time In: 0930  Time Out: 1013     Stephen Delgadillo PT         Charge Capture  Events  [facility] Portal  Appt Desk  Attendance Report     Future Appointments   Date Time Provider Department Center   3/18/2025  3:30 PM CINDY FLEMING   3/20/2025  3:30 PM CINDY ROSS

## 2025-03-18 ENCOUNTER — HOSPITAL ENCOUNTER (OUTPATIENT)
Dept: PHYSICAL THERAPY | Age: 75
Setting detail: RECURRING SERIES
Discharge: HOME | End: 2025-03-21
Attending: STUDENT IN AN ORGANIZED HEALTH CARE EDUCATION/TRAINING PROGRAM
Payer: MEDICARE

## 2025-03-18 DIAGNOSIS — G89.29 CHRONIC RIGHT SHOULDER PAIN: Primary | ICD-10-CM

## 2025-03-18 DIAGNOSIS — M25.611 DECREASED RIGHT SHOULDER RANGE OF MOTION: ICD-10-CM

## 2025-03-18 DIAGNOSIS — M25.511 CHRONIC RIGHT SHOULDER PAIN: Primary | ICD-10-CM

## 2025-03-18 PROCEDURE — 97140 MANUAL THERAPY 1/> REGIONS: CPT

## 2025-03-18 PROCEDURE — 97110 THERAPEUTIC EXERCISES: CPT

## 2025-03-18 NOTE — PROGRESS NOTES
Kalee Farmer  : 1950  Primary: Medicare Part A And B (Medicare)  [facility] [place]  [place] [address]  Phone: [phone]  Fax: [phone] Plan Frequency: 2x    Plan of Care/Certification Expiration Date: 25        Plan of Care/Certification Expiration Date:  Plan of Care/Certification Expiration Date: 25    Frequency/Duration:   Plan Frequency: 2x      Time In/Out:   Time In: 1532  Time Out: 1612      PT Visit Info:         Visit Count:  6    OUTPATIENT PHYSICAL THERAPY:   Treatment Note 3/18/2025       Episode  (R shoulder pain )               Treatment Diagnosis:    Chronic right shoulder pain  Decreased right shoulder range of motion  Medical/Referring Diagnosis:    Right shoulder pain, unspecified chronicity  Arthritis of right shoulder region  Chronic right shoulder pain      Referring Physician:  Diego Qureshi MD MD Orders:  PT Eval and Treat   Return MD Appt:  TBD   Date of Onset:  No data recorded   Allergies:   Patient has no known allergies.  Restrictions/Precautions:   None      Interventions Planned (Treatment may consist of any combination of the following):     See Assessment Note    Subjective Comments:   Pt reports that she has no pain today.    Initial Pain Level:     0 /10  Post Session Pain Level:      0 /10  Medications Last Reviewed: 3/18/2025  Updated Objective Findings:  None Today  Treatment   Outcome Measure:   Tool Used: Disabilities of the Arm, Shoulder and Hand (DASH) Questionnaire - Quick Version  Score:  Initial:   Most Recent:  (Date: -- )   Interpretation of Score: The DASH is designed to measure the activities of daily living in person's with upper extremity dysfunction or pain.  Each section is scored on a 1-5 scale, 5 representing the greatest disability.  The scores of each section are added together for a total score of 55.      Goals: (Goals have been discussed and agreed

## 2025-03-20 ENCOUNTER — HOSPITAL ENCOUNTER (OUTPATIENT)
Dept: PHYSICAL THERAPY | Age: 75
Setting detail: RECURRING SERIES
Discharge: HOME | End: 2025-03-23
Attending: STUDENT IN AN ORGANIZED HEALTH CARE EDUCATION/TRAINING PROGRAM
Payer: MEDICARE

## 2025-03-20 DIAGNOSIS — M25.611 DECREASED RIGHT SHOULDER RANGE OF MOTION: ICD-10-CM

## 2025-03-20 DIAGNOSIS — G89.29 CHRONIC RIGHT SHOULDER PAIN: Primary | ICD-10-CM

## 2025-03-20 DIAGNOSIS — M25.511 CHRONIC RIGHT SHOULDER PAIN: Primary | ICD-10-CM

## 2025-03-20 PROCEDURE — 97140 MANUAL THERAPY 1/> REGIONS: CPT

## 2025-03-20 PROCEDURE — 97110 THERAPEUTIC EXERCISES: CPT

## 2025-03-20 NOTE — PROGRESS NOTES
Kalee Farmer  : 1950  Primary: Medicare Part A And B (Medicare)  [facility] [place]  [address] [place] [address]  Phone: [phone]  Fax: [phone] Plan Frequency: 2x    Plan of Care/Certification Expiration Date: 25        Plan of Care/Certification Expiration Date:  Plan of Care/Certification Expiration Date: 25    Frequency/Duration:   Plan Frequency: 2x      Time In/Out:   Time In: 1530  Time Out: 1612      PT Visit Info:         Visit Count:  7    OUTPATIENT PHYSICAL THERAPY:   Treatment Note 3/20/2025       Episode  (R shoulder pain )               Treatment Diagnosis:    Chronic right shoulder pain  Decreased right shoulder range of motion  Medical/Referring Diagnosis:    Right shoulder pain, unspecified chronicity  Arthritis of right shoulder region  Chronic right shoulder pain      Referring Physician:  Diego Qureshi MD MD Orders:  PT Eval and Treat   Return MD Appt:  TBD   Date of Onset:  No data recorded   Allergies:   Patient has no known allergies.  Restrictions/Precautions:   None      Interventions Planned (Treatment may consist of any combination of the following):     See Assessment Note    Subjective Comments:   Pt reports no pain increases   Initial Pain Level:     0 /10  Post Session Pain Level:      0 /10  Medications Last Reviewed: 3/20/2025  Updated Objective Findings:  None Today  Treatment   Outcome Measure:   Tool Used: Disabilities of the Arm, Shoulder and Hand (DASH) Questionnaire - Quick Version  Score:  Initial:   Most Recent:  (Date: -- )   Interpretation of Score: The DASH is designed to measure the activities of daily living in person's with upper extremity dysfunction or pain.  Each section is scored on a 1-5 scale, 5 representing the greatest disability.  The scores of each section are added together for a total score of 55.      Goals: (Goals have been discussed and agreed upon with

## 2025-03-24 ENCOUNTER — HOSPITAL ENCOUNTER (OUTPATIENT)
Dept: PHYSICAL THERAPY | Age: 75
Setting detail: RECURRING SERIES
Discharge: HOME | End: 2025-03-27
Attending: STUDENT IN AN ORGANIZED HEALTH CARE EDUCATION/TRAINING PROGRAM
Payer: MEDICARE

## 2025-03-24 DIAGNOSIS — G89.29 CHRONIC RIGHT SHOULDER PAIN: Primary | ICD-10-CM

## 2025-03-24 DIAGNOSIS — M25.511 CHRONIC RIGHT SHOULDER PAIN: Primary | ICD-10-CM

## 2025-03-24 DIAGNOSIS — M25.611 DECREASED RIGHT SHOULDER RANGE OF MOTION: ICD-10-CM

## 2025-03-24 PROCEDURE — 97110 THERAPEUTIC EXERCISES: CPT

## 2025-03-24 PROCEDURE — 97140 MANUAL THERAPY 1/> REGIONS: CPT

## 2025-03-24 NOTE — PROGRESS NOTES
the activities of daily living in person's with upper extremity dysfunction or pain.  Each section is scored on a 1-5 scale, 5 representing the greatest disability.  The scores of each section are added together for a total score of 55.      Goals: (Goals have been discussed and agreed upon with patient.)  Short-Term Functional Goals: Time Frame: 4 weeks  Patient will be independent with her HEP (MET)  Patient will decrease her quick DASH score to 20/55 to demonstrate decreased disability and increased QOL (Progressing)  Discharge Goals: Time Frame: 8 weeks  Patient will increase AROM in flexion to 160 to assist with reaching overhead and doing household chores (Ongoing)  Patient will increase functional ER to 90 degrees to assist with washing her back and putting her hair in a ponytail (Progressing)  Patient will decrease pain overall from 5 to 0 to demonstrate increased QOL (Progressing)      TREATMENT:   THERAPEUTIC ACTIVITY: ( see below for minutes): Therapeutic activities per grid below to improve mobility, strength, balance and coordination. Required minimal visual, verbal, manual and tactile cues to improve independence and safety with daily activities .  THERAPEUTIC EXERCISE: (see below for minutes): Exercises per grid below to improve mobility, strength, balance and coordination. Required minimal verbal and manual cues to promote proper body alignment, promote proper body posture and promote proper body mechanics. Progressed resistance, range, repetitions and complexity of movement as indicated.  MANUAL THERAPY: (see below for minutes): Joint mobilization and Soft tissue mobilization was utilized and necessary because of the patient's restricted joint motion, painful spasm, loss of articular motion and restricted motion of soft tissue.   MODALITIES: (see below for minutes): to decrease pain   SELF CARE: (see below for minutes): Procedure(s) (per grid) utilized to improve and/or restore self-care/home

## 2025-03-25 ENCOUNTER — APPOINTMENT (OUTPATIENT)
Dept: PHYSICAL THERAPY | Age: 75
End: 2025-03-25
Attending: STUDENT IN AN ORGANIZED HEALTH CARE EDUCATION/TRAINING PROGRAM
Payer: MEDICARE

## 2025-03-27 ENCOUNTER — APPOINTMENT (OUTPATIENT)
Dept: PHYSICAL THERAPY | Age: 75
End: 2025-03-27
Attending: STUDENT IN AN ORGANIZED HEALTH CARE EDUCATION/TRAINING PROGRAM
Payer: MEDICARE

## 2025-04-01 ENCOUNTER — HOSPITAL ENCOUNTER (OUTPATIENT)
Dept: PHYSICAL THERAPY | Age: 75
Setting detail: RECURRING SERIES
Discharge: HOME | End: 2025-04-04
Attending: STUDENT IN AN ORGANIZED HEALTH CARE EDUCATION/TRAINING PROGRAM
Payer: MEDICARE

## 2025-04-01 DIAGNOSIS — G89.29 CHRONIC RIGHT SHOULDER PAIN: Primary | ICD-10-CM

## 2025-04-01 DIAGNOSIS — M25.511 CHRONIC RIGHT SHOULDER PAIN: Primary | ICD-10-CM

## 2025-04-01 DIAGNOSIS — M25.611 DECREASED RIGHT SHOULDER RANGE OF MOTION: ICD-10-CM

## 2025-04-01 PROCEDURE — 97110 THERAPEUTIC EXERCISES: CPT

## 2025-04-01 PROCEDURE — 97140 MANUAL THERAPY 1/> REGIONS: CPT
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
## 2025-04-04 ENCOUNTER — HOSPITAL ENCOUNTER (OUTPATIENT)
Dept: PHYSICAL THERAPY | Age: 75
Setting detail: RECURRING SERIES
Discharge: HOME | End: 2025-04-07
Attending: STUDENT IN AN ORGANIZED HEALTH CARE EDUCATION/TRAINING PROGRAM
Payer: MEDICARE

## 2025-04-04 DIAGNOSIS — M25.511 CHRONIC RIGHT SHOULDER PAIN: Primary | ICD-10-CM

## 2025-04-04 DIAGNOSIS — M25.611 DECREASED RIGHT SHOULDER RANGE OF MOTION: ICD-10-CM

## 2025-04-04 DIAGNOSIS — G89.29 CHRONIC RIGHT SHOULDER PAIN: Primary | ICD-10-CM

## 2025-04-04 PROCEDURE — 97110 THERAPEUTIC EXERCISES: CPT

## 2025-04-04 PROCEDURE — 97140 MANUAL THERAPY 1/> REGIONS: CPT

## 2025-04-04 NOTE — PROGRESS NOTES
Treatment/Session Summary:    Treatment Assessment:   Patient tolerated tx well and had no adverse reactions. Patient able to progress in periscapular strengthening. Patient educated about side effects and possibilities of shoulder replacement. Patient still does not want one. She has continued to gain strength within a functional range.   Equipment provided today:  None  Recommendations/Intent for next treatment session: Next visit continue UE strengthening.   >Total Treatment Billable Duration:  40 minutes   Time In: 1145  Time Out: 1225     Stephen Delgadillo PT         Charge Capture  Events  [facility] Portal  Appt Desk  Attendance Report     Future Appointments   Date Time Provider Department Center   4/8/2025  2:00 PM CINDY PRN [facility]   4/9/2025 10:30 AM ELVIA Dhillon MD [facility] AM[facility]   4/11/2025 10:15 AM CINDY PRN [facility]   4/15/2025  2:00 PM CINDY PRN [facility]   4/17/2025  2:00 PM CINDY PRN [facility] S[facility]   4/22/2025  2:00 PM CINDY PRN [facility] S[facility]   4/24/2025  2:00 PM CINDY PRN [facility] S[facility]   4/29/2025  2:00 PM CINDY PRN [facility] S[facility]   5/1/2025  2:00 PM CINDY PRN [facility]   5/16/2025  1:15 PM Pilo Hay, APRN - CNP [facility] AM[facility]   6/4/2025 10:00 AM Beena Qiuntero DO E[facility] AM[facility]   6/4/2025 10:00 AM Miguelina Johnson AuD [facility] AM[facility]   8/4/2025  9:20 AM Olimpia Calvillo APRN - CNP [facility] DEP

## 2025-04-08 ENCOUNTER — HOSPITAL ENCOUNTER (OUTPATIENT)
Dept: PHYSICAL THERAPY | Age: 75
Setting detail: RECURRING SERIES
Discharge: HOME | End: 2025-04-11
Attending: STUDENT IN AN ORGANIZED HEALTH CARE EDUCATION/TRAINING PROGRAM
Payer: MEDICARE

## 2025-04-08 DIAGNOSIS — M25.611 DECREASED RIGHT SHOULDER RANGE OF MOTION: ICD-10-CM

## 2025-04-08 DIAGNOSIS — M25.511 CHRONIC RIGHT SHOULDER PAIN: Primary | ICD-10-CM

## 2025-04-08 DIAGNOSIS — G89.29 CHRONIC RIGHT SHOULDER PAIN: Primary | ICD-10-CM

## 2025-04-08 PROCEDURE — 97140 MANUAL THERAPY 1/> REGIONS: CPT

## 2025-04-08 PROCEDURE — 97110 THERAPEUTIC EXERCISES: CPT

## 2025-04-08 NOTE — PROGRESS NOTES
ABD, ER, IR  PROM R shoulder to tolerance flexion, ABD, ER, IR    STM to infraspinatus and subscapularis, Scapular mobs In all directions         Functional Activities:                                                      Treatment/Session Summary:    Treatment Assessment:   Patient tolerated tx well and had no adverse reactions. Patient able to progress UE strengthening. Continues to struggle with overhead activities. She will meet with ortho surgeon tomorrow for evaluation.   Equipment provided today:  None  Recommendations/Intent for next treatment session: focus on periscapular strengthening  >Total Treatment Billable Duration:  42 minutes   Time In: 1400  Time Out: 1442     Stephen Delgadillo, PT         Charge Capture  Events  [employer] Portal  Appt Desk  Attendance Report     Future Appointments   Date Time Provider Department Center   4/9/2025 10:30 AM ELVIA Dhillon MD [facility] AM[facility]   4/11/2025 10:15 AM CINDY PRN [facility] SFO   4/15/2025  2:00 PM CINDY PRN [facility] S[facility]   4/17/2025  2:00 PM CINDY PRN [facility] SFO   4/22/2025  2:00 PM CINDY PRN [facility] SFO   4/24/2025  2:00 PM CINDY PRN [facility] SFO   4/29/2025  2:00 PM CINDY PRN S[facility] SFO   5/1/2025  2:00 PM CINDY PRN [facility] SFO   5/16/2025  1:15 PM Pilo Hay, APRN - CNP [facility] AMB   6/4/2025 10:00 AM Beena Quintero,  ENTG [facility] AMB   6/4/2025 10:00 AM Miguelina Johnson AuD [facility] AMB   8/4/2025  9:20 AM Olimpia Calvillo APRN - CNP [facility] DEP

## 2025-04-09 ENCOUNTER — OFFICE VISIT (OUTPATIENT)
Dept: ORTHOPEDIC SURGERY | Age: 75
End: 2025-04-09
Payer: MEDICARE

## 2025-04-09 DIAGNOSIS — M25.511 RIGHT SHOULDER PAIN, UNSPECIFIED CHRONICITY: Primary | ICD-10-CM

## 2025-04-09 DIAGNOSIS — M19.011 ARTHRITIS OF RIGHT SHOULDER REGION: ICD-10-CM

## 2025-04-09 PROCEDURE — 1090F PRES/ABSN URINE INCON ASSESS: CPT | Performed by: ORTHOPAEDIC SURGERY

## 2025-04-09 PROCEDURE — 1123F ACP DISCUSS/DSCN MKR DOCD: CPT | Performed by: ORTHOPAEDIC SURGERY

## 2025-04-09 PROCEDURE — G8428 CUR MEDS NOT DOCUMENT: HCPCS | Performed by: ORTHOPAEDIC SURGERY

## 2025-04-09 PROCEDURE — 99204 OFFICE O/P NEW MOD 45 MIN: CPT | Performed by: ORTHOPAEDIC SURGERY

## 2025-04-09 PROCEDURE — 3017F COLORECTAL CA SCREEN DOC REV: CPT | Performed by: ORTHOPAEDIC SURGERY

## 2025-04-09 PROCEDURE — G8399 PT W/DXA RESULTS DOCUMENT: HCPCS | Performed by: ORTHOPAEDIC SURGERY

## 2025-04-09 PROCEDURE — 1036F TOBACCO NON-USER: CPT | Performed by: ORTHOPAEDIC SURGERY

## 2025-04-09 PROCEDURE — G8419 CALC BMI OUT NRM PARAM NOF/U: HCPCS | Performed by: ORTHOPAEDIC SURGERY

## 2025-04-09 NOTE — PROGRESS NOTES
Mother     High Cholesterol Mother     Osteoarthritis Mother     Stroke Mother     Vision Loss Mother     Asthma Father     Coronary Art Dis Father     Hearing Loss Father     Heart Disease Father     Allergy (Severe) Sister     Asthma Sister     Diabetes Sister     Early Death Sister     Obesity Sister     Arthritis Sister     Colon Cancer Sister     Miscarriages / Stillbirths Sister     Osteoarthritis Sister     Heart Attack Brother     Obesity Brother      Social History     Socioeconomic History    Marital status:      Spouse name: Not on file    Number of children: Not on file    Years of education: Not on file    Highest education level: Not on file   Occupational History    Not on file   Tobacco Use    Smoking status: Former     Current packs/day: 0.00     Average packs/day: 0.3 packs/day for 4.0 years (1.0 ttl pk-yrs)     Types: Cigarettes     Quit date: 7/10/1978     Years since quittin.7    Smokeless tobacco: Never    Tobacco comments:     limited   Substance and Sexual Activity    Alcohol use: Yes     Alcohol/week: 3.0 standard drinks of alcohol     Types: 3 Cans of beer per week    Drug use: Never    Sexual activity: Yes     Partners: Male   Other Topics Concern    Not on file   Social History Narrative    Not on file     Social Drivers of Health     Financial Resource Strain: Low Risk  (2024)    Overall Financial Resource Strain (CARDIA)     Difficulty of Paying Living Expenses: Not hard at all   Food Insecurity: No Food Insecurity (2025)    Hunger Vital Sign     Worried About Running Out of Food in the Last Year: Never true     Ran Out of Food in the Last Year: Never true   Transportation Needs: No Transportation Needs (2025)    PRAPARE - Transportation     Lack of Transportation (Medical): No     Lack of Transportation (Non-Medical): No   Physical Activity: Insufficiently Active (2025)    Exercise Vital Sign     Days of Exercise per Week: 3 days     Minutes of Exercise 
shoulder pain, unspecified chronicity  M25.511       2. Arthritis of right shoulder region  M19.011             ***       has a past medical history of Arthritis, Fractured hip (HCC) (11/2020), Fractured shoulder (11/2020), Hearing loss, Hyperlipidemia, Hypothyroidism, Osteoarthritis, and Sleep apnea.    No follow-ups on file.        MATILDE DELEON, ATC  04/08/25   The patient (or guardian, if applicable) and other individuals in attendance with the patient were advised that Artificial Intelligence will be utilized during this visit to record, process the conversation to generate a clinical note, and support improvement of the AI technology. The patient (or guardian, if applicable) and other individuals in attendance at the appointment consented to the use of AI, including the recording.

## 2025-04-11 ENCOUNTER — HOSPITAL ENCOUNTER (OUTPATIENT)
Dept: PHYSICAL THERAPY | Age: 75
Setting detail: RECURRING SERIES
Discharge: HOME | End: 2025-04-14
Attending: STUDENT IN AN ORGANIZED HEALTH CARE EDUCATION/TRAINING PROGRAM
Payer: MEDICARE

## 2025-04-11 DIAGNOSIS — M25.511 CHRONIC RIGHT SHOULDER PAIN: Primary | ICD-10-CM

## 2025-04-11 DIAGNOSIS — G89.29 CHRONIC RIGHT SHOULDER PAIN: Primary | ICD-10-CM

## 2025-04-11 DIAGNOSIS — M25.611 DECREASED RIGHT SHOULDER RANGE OF MOTION: ICD-10-CM

## 2025-04-11 PROCEDURE — 97140 MANUAL THERAPY 1/> REGIONS: CPT

## 2025-04-11 PROCEDURE — 97110 THERAPEUTIC EXERCISES: CPT

## 2025-04-11 NOTE — PROGRESS NOTES
Kalee Farmer  : 1950  Primary: Medicare Part A And B (Medicare)  Secondary: BCBS [facility]  [address] [place] [address]  Phone: [phone]  Fax: [phone] Plan Frequency: 2x    Plan of Care/Certification Expiration Date: 25        Plan of Care/Certification Expiration Date:  Plan of Care/Certification Expiration Date: 25    Frequency/Duration:   Plan Frequency: 2x      Time In/Out:   Time In: 1015  Time Out: 1100      PT Visit Info:         Visit Count:  12    OUTPATIENT PHYSICAL THERAPY:   Treatment Note and Discharge Note 2025       Episode  (R shoulder pain )               Treatment Diagnosis:    Chronic right shoulder pain  Decreased right shoulder range of motion  Medical/Referring Diagnosis:    Right shoulder pain, unspecified chronicity  Arthritis of right shoulder region  Chronic right shoulder pain      Referring Physician:  Diego Qureshi MD MD Orders:  PT Eval and Treat   Return MD Appt:  25  Date of Onset:  No data recorded   Allergies:   Patient has no known allergies.  Restrictions/Precautions:   None      Interventions Planned (Treatment may consist of any combination of the following):     See Assessment Note    Subjective Comments:   Pt reports no pain today, she states that the ortho told her she can discontinue with therapy.    Initial Pain Level:     0 /10  Post Session Pain Level:      0 /10  Medications Last Reviewed: 2025  Updated Objective Findings:  See Discharge Note from today    3/24/25:  AROM R:L  Shoulder flexion 110 deg  Shoulder abduction 85 deg  Shoulder ER 80 deg   Shoulder functional IR L5     MMT R:L  Upper trap 5:5  Supraspinatus 5:5  Deltoid 4+:5  Bicep 4+:5     Treatment   Outcome Measure:   Tool Used: Disabilities of the Arm, Shoulder and Hand (DASH) Questionnaire - Quick Version  Score:  Initial:   Most Recent:  (Date: 3/24/25 )   Interpretation of Score: The DASH is

## 2025-04-15 ENCOUNTER — APPOINTMENT (OUTPATIENT)
Dept: PHYSICAL THERAPY | Age: 75
End: 2025-04-15
Attending: STUDENT IN AN ORGANIZED HEALTH CARE EDUCATION/TRAINING PROGRAM
Payer: MEDICARE

## 2025-04-17 ENCOUNTER — APPOINTMENT (OUTPATIENT)
Dept: PHYSICAL THERAPY | Age: 75
End: 2025-04-17
Attending: STUDENT IN AN ORGANIZED HEALTH CARE EDUCATION/TRAINING PROGRAM
Payer: MEDICARE

## 2025-04-22 ENCOUNTER — APPOINTMENT (OUTPATIENT)
Dept: PHYSICAL THERAPY | Age: 75
End: 2025-04-22
Attending: STUDENT IN AN ORGANIZED HEALTH CARE EDUCATION/TRAINING PROGRAM
Payer: MEDICARE

## 2025-04-24 ENCOUNTER — APPOINTMENT (OUTPATIENT)
Dept: PHYSICAL THERAPY | Age: 75
End: 2025-04-24
Attending: STUDENT IN AN ORGANIZED HEALTH CARE EDUCATION/TRAINING PROGRAM
Payer: MEDICARE

## 2025-04-29 ENCOUNTER — APPOINTMENT (OUTPATIENT)
Dept: PHYSICAL THERAPY | Age: 75
End: 2025-04-29
Attending: STUDENT IN AN ORGANIZED HEALTH CARE EDUCATION/TRAINING PROGRAM
Payer: MEDICARE

## 2025-05-05 ENCOUNTER — RESULTS FOLLOW-UP (OUTPATIENT)
Dept: INTERNAL MEDICINE CLINIC | Facility: CLINIC | Age: 75
End: 2025-05-05

## 2025-05-05 ENCOUNTER — LAB (OUTPATIENT)
Dept: INTERNAL MEDICINE CLINIC | Facility: CLINIC | Age: 75
End: 2025-05-05

## 2025-05-05 DIAGNOSIS — E03.9 ACQUIRED HYPOTHYROIDISM: ICD-10-CM

## 2025-05-05 DIAGNOSIS — E03.9 ACQUIRED HYPOTHYROIDISM: Primary | ICD-10-CM

## 2025-05-05 LAB — TSH W FREE THYROID IF ABNORMAL: 0.47 UIU/ML (ref 0.27–4.2)

## 2025-05-13 ASSESSMENT — SLEEP AND FATIGUE QUESTIONNAIRES
HOW LIKELY ARE YOU TO NOD OFF OR FALL ASLEEP WHILE SITTING QUIETLY AFTER LUNCH WITHOUT ALCOHOL: WOULD NEVER DOZE
HOW LIKELY ARE YOU TO NOD OFF OR FALL ASLEEP WHEN YOU ARE A PASSENGER IN A CAR FOR AN HOUR WITHOUT A BREAK: SLIGHT CHANCE OF DOZING
HOW LIKELY ARE YOU TO NOD OFF OR FALL ASLEEP IN A CAR, WHILE STOPPED FOR A FEW MINUTES IN TRAFFIC: WOULD NEVER DOZE
HOW LIKELY ARE YOU TO NOD OFF OR FALL ASLEEP WHILE SITTING AND TALKING TO SOMEONE: WOULD NEVER DOZE
ESS TOTAL SCORE: 6
HOW LIKELY ARE YOU TO NOD OFF OR FALL ASLEEP WHILE LYING DOWN TO REST IN THE AFTERNOON WHEN CIRCUMSTANCES PERMIT: SLIGHT CHANCE OF DOZING
HOW LIKELY ARE YOU TO NOD OFF OR FALL ASLEEP WHILE SITTING AND READING: SLIGHT CHANCE OF DOZING
HOW LIKELY ARE YOU TO NOD OFF OR FALL ASLEEP WHILE SITTING INACTIVE IN A PUBLIC PLACE: WOULD NEVER DOZE
HOW LIKELY ARE YOU TO NOD OFF OR FALL ASLEEP WHILE SITTING AND READING: SLIGHT CHANCE OF DOZING
HOW LIKELY ARE YOU TO NOD OFF OR FALL ASLEEP WHEN YOU ARE A PASSENGER IN A CAR FOR AN HOUR WITHOUT A BREAK: SLIGHT CHANCE OF DOZING
HOW LIKELY ARE YOU TO NOD OFF OR FALL ASLEEP WHILE SITTING AND TALKING TO SOMEONE: WOULD NEVER DOZE
HOW LIKELY ARE YOU TO NOD OFF OR FALL ASLEEP WHILE SITTING INACTIVE IN A PUBLIC PLACE: WOULD NEVER DOZE
HOW LIKELY ARE YOU TO NOD OFF OR FALL ASLEEP WHILE WATCHING TV: HIGH CHANCE OF DOZING
HOW LIKELY ARE YOU TO NOD OFF OR FALL ASLEEP IN A CAR, WHILE STOPPED FOR A FEW MINUTES IN TRAFFIC: WOULD NEVER DOZE
HOW LIKELY ARE YOU TO NOD OFF OR FALL ASLEEP WHILE SITTING QUIETLY AFTER LUNCH WITHOUT ALCOHOL: WOULD NEVER DOZE
HOW LIKELY ARE YOU TO NOD OFF OR FALL ASLEEP WHILE WATCHING TV: HIGH CHANCE OF DOZING
HOW LIKELY ARE YOU TO NOD OFF OR FALL ASLEEP WHILE LYING DOWN TO REST IN THE AFTERNOON WHEN CIRCUMSTANCES PERMIT: SLIGHT CHANCE OF DOZING

## 2025-05-15 ENCOUNTER — TELEPHONE (OUTPATIENT)
Dept: SLEEP MEDICINE | Age: 75
End: 2025-05-15

## 2025-05-16 ENCOUNTER — OFFICE VISIT (OUTPATIENT)
Dept: SLEEP MEDICINE | Age: 75
End: 2025-05-16

## 2025-05-16 VITALS
DIASTOLIC BLOOD PRESSURE: 67 MMHG | HEIGHT: 65 IN | SYSTOLIC BLOOD PRESSURE: 154 MMHG | WEIGHT: 108 LBS | OXYGEN SATURATION: 98 % | BODY MASS INDEX: 17.99 KG/M2 | HEART RATE: 66 BPM | RESPIRATION RATE: 17 BRPM

## 2025-05-16 DIAGNOSIS — G47.33 OSA (OBSTRUCTIVE SLEEP APNEA): Primary | ICD-10-CM

## 2025-05-16 DIAGNOSIS — R06.83 SNORING: ICD-10-CM

## 2025-05-16 DIAGNOSIS — R06.81 WITNESSED APNEIC SPELLS: ICD-10-CM

## 2025-05-16 NOTE — PATIENT INSTRUCTIONS
Continue CPAP at new pressure settings of 6-10 cm H2O with nightly compliance  New CPAP supplies ordered with request for ResMed N30 I AirTouch  Recommendations as above  Follow-up in 4 months or sooner if needed

## 2025-05-16 NOTE — PROGRESS NOTES
[facility]  [address]  [place] [address]  [phone]    Patient Name:  Kalee Farmer  YOB: 1950      Office Visit 5/16/2025    CHIEF COMPLAINT:    Chief Complaint   Patient presents with    New Patient    Sleep Apnea       HISTORY OF PRESENT ILLNESS:      The patient presents in outpatient consultation at the request of Dr. Toure for management of obstructive sleep apnea.  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     The diagnostic polysomnography was notable for an apnea hypopnea index of 24.0 including 189 obstructive apneas and 7 hypopneas.  Oxygen desaturations are low as 90% were noted with SpO2 less than 89% for a total of 0.0 minutes of the test.  Significant cardiac arrhythmias were not evident.      Her  is present with her during the exam and reports that he is concerned about her events being higher since she got a new machine approximately 1 year ago.  He reports that prior to her being diagnosed with LENIN he would see her stop breathing when she was sleeping which was very scary.  He states that she would also occasionally snore.  She reports that she feels like she has not had any problems with sleeping for most of her life.  However she does wear the CPAP religiously because she knows it is good for her overall health due to her level of sleep apnea.  Her AHI is elevated at an average of 8.4 over the last 10 months.  She is having an average of 7.0 obstructive apneas.  We discussed changing her to an APAP of 6-10 cm H2O and they are in agreement with this.  She denies having any problems with excessive daytime sleepiness and fatigue.  Udall score is 6/24.  Denies any major medical changes over the last year.  Reports that her weight has consistently been around 105 pounds.  Her blood pressure is slightly elevated today at 154/67.    She is prescribed cpap therapy with

## 2025-05-27 ENCOUNTER — E-VISIT (OUTPATIENT)
Dept: INTERNAL MEDICINE CLINIC | Facility: CLINIC | Age: 75
End: 2025-05-27
Payer: MEDICARE

## 2025-05-27 DIAGNOSIS — J06.9 URI, ACUTE: Primary | ICD-10-CM

## 2025-05-27 PROCEDURE — 99422 OL DIG E/M SVC 11-20 MIN: CPT | Performed by: NURSE PRACTITIONER

## 2025-05-27 RX ORDER — AZITHROMYCIN 250 MG/1
TABLET, FILM COATED ORAL
Qty: 6 TABLET | Refills: 0 | Status: SHIPPED | OUTPATIENT
Start: 2025-05-27 | End: 2025-06-06

## 2025-05-27 RX ORDER — METHYLPREDNISOLONE 4 MG/1
TABLET ORAL
Qty: 1 KIT | Refills: 0 | Status: SHIPPED | OUTPATIENT
Start: 2025-05-27 | End: 2025-06-02

## 2025-05-27 ASSESSMENT — LIFESTYLE VARIABLES
SMOKING_YEARS: 4
PACKS_PER_DAY: 0
SMOKING_STATUS: NO, I'M A FORMER SMOKER

## 2025-05-27 NOTE — PROGRESS NOTES
Kalee Farmer (1950) initiated an asynchronous digital communication through [employer].    HPI: per patient questionnaire     Exam: not applicable    Diagnoses and all orders for this visit:  1. URI, acute  See my chart message.   Advised to hold antibiotic and only begin for greater than 7 days of sx or if her sx worsen or she develops fevers.   Follow up in office prn.  - methylPREDNISolone (MEDROL DOSEPACK) 4 MG tablet; Take by mouth.  Dispense: 1 kit; Refill: 0  - azithromycin (ZITHROMAX) 250 MG tablet; 500mg on day 1 followed by 250mg on days 2 - 5  Dispense: 6 tablet; Refill: 0      20 minutes were spent on the digital evaluation and management of this patient.    Olimpia Calvillo, APRN - CNP

## 2025-07-16 ENCOUNTER — OFFICE VISIT (OUTPATIENT)
Dept: AUDIOLOGY | Age: 75
End: 2025-07-16
Payer: MEDICARE

## 2025-07-16 ENCOUNTER — OFFICE VISIT (OUTPATIENT)
Dept: ENT CLINIC | Age: 75
End: 2025-07-16
Payer: MEDICARE

## 2025-07-16 VITALS
RESPIRATION RATE: 17 BRPM | HEIGHT: 65 IN | HEART RATE: 76 BPM | BODY MASS INDEX: 17.83 KG/M2 | WEIGHT: 107 LBS | OXYGEN SATURATION: 97 %

## 2025-07-16 DIAGNOSIS — H90.3 SENSORINEURAL HEARING LOSS, BILATERAL: Primary | ICD-10-CM

## 2025-07-16 DIAGNOSIS — H93.13 TINNITUS OF BOTH EARS: ICD-10-CM

## 2025-07-16 DIAGNOSIS — H90.3 SENSORINEURAL HEARING LOSS (SNHL) OF BOTH EARS: Primary | ICD-10-CM

## 2025-07-16 PROCEDURE — 1123F ACP DISCUSS/DSCN MKR DOCD: CPT | Performed by: STUDENT IN AN ORGANIZED HEALTH CARE EDUCATION/TRAINING PROGRAM

## 2025-07-16 PROCEDURE — 3017F COLORECTAL CA SCREEN DOC REV: CPT | Performed by: STUDENT IN AN ORGANIZED HEALTH CARE EDUCATION/TRAINING PROGRAM

## 2025-07-16 PROCEDURE — G8419 CALC BMI OUT NRM PARAM NOF/U: HCPCS | Performed by: STUDENT IN AN ORGANIZED HEALTH CARE EDUCATION/TRAINING PROGRAM

## 2025-07-16 PROCEDURE — 1036F TOBACCO NON-USER: CPT | Performed by: STUDENT IN AN ORGANIZED HEALTH CARE EDUCATION/TRAINING PROGRAM

## 2025-07-16 PROCEDURE — 1160F RVW MEDS BY RX/DR IN RCRD: CPT | Performed by: STUDENT IN AN ORGANIZED HEALTH CARE EDUCATION/TRAINING PROGRAM

## 2025-07-16 PROCEDURE — 92567 TYMPANOMETRY: CPT | Performed by: AUDIOLOGIST

## 2025-07-16 PROCEDURE — G8427 DOCREV CUR MEDS BY ELIG CLIN: HCPCS | Performed by: STUDENT IN AN ORGANIZED HEALTH CARE EDUCATION/TRAINING PROGRAM

## 2025-07-16 PROCEDURE — 92557 COMPREHENSIVE HEARING TEST: CPT | Performed by: AUDIOLOGIST

## 2025-07-16 PROCEDURE — 1159F MED LIST DOCD IN RCRD: CPT | Performed by: STUDENT IN AN ORGANIZED HEALTH CARE EDUCATION/TRAINING PROGRAM

## 2025-07-16 PROCEDURE — G8399 PT W/DXA RESULTS DOCUMENT: HCPCS | Performed by: STUDENT IN AN ORGANIZED HEALTH CARE EDUCATION/TRAINING PROGRAM

## 2025-07-16 PROCEDURE — 1090F PRES/ABSN URINE INCON ASSESS: CPT | Performed by: STUDENT IN AN ORGANIZED HEALTH CARE EDUCATION/TRAINING PROGRAM

## 2025-07-16 PROCEDURE — 99203 OFFICE O/P NEW LOW 30 MIN: CPT | Performed by: STUDENT IN AN ORGANIZED HEALTH CARE EDUCATION/TRAINING PROGRAM

## 2025-07-16 ASSESSMENT — ENCOUNTER SYMPTOMS
APNEA: 0
STRIDOR: 0
WHEEZING: 0
SINUS PRESSURE: 0
CHOKING: 0
FACIAL SWELLING: 0
SINUS PAIN: 0
EYE DISCHARGE: 0
EYE PAIN: 0
DIARRHEA: 0
NAUSEA: 0
COUGH: 0
EYE ITCHING: 0
SHORTNESS OF BREATH: 0
CONSTIPATION: 0

## 2025-07-16 NOTE — PROGRESS NOTES
HPI:    Kalee Farmer is a 74 y.o. female seen New    Chief Complaint   Patient presents with    Tinnitus     Bilateral , chronic , really sandy pitched x 30 secs . Patient denies any dizziness . She has noticed difficulty in group settings.        History of Present Illness  74-year-old female presents for new patient evaluation for tinnitus and hearing loss.    Reports constant ringing in ears, occasionally escalating to high pitch lasting up to 30 seconds. Ongoing for several years, making conversation difficult, especially in groups. Sometimes mishears. Unable to determine if background noise is more pronounced in one ear or both. Tinnitus does not intensify in quiet rooms. No history of ear infections. Water sometimes enters ears during showers.    Underwent MRI of brain 1-2 years ago.        Past Medical History, Past Surgical History, Family history, Social History, and Medications were all reviewed with the patient today and updated as necessary.     No Known Allergies    Patient Active Problem List   Diagnosis    Avascular necrosis of femoral head, right (HCC)    Right hip pain    AVN of femur (HCC)    DDD (degenerative disc disease), lumbar    Dyslipidemia    Status post right hip replacement    Acquired hypothyroidism    Witnessed apneic spells    Snoring    LENIN (obstructive sleep apnea)       Current Outpatient Medications   Medication Sig    levothyroxine (SYNTHROID) 75 MCG tablet Take 1 tablet by mouth daily    rosuvastatin (CRESTOR) 5 MG tablet Take 1 tablet by mouth every other day    amoxicillin (AMOXIL) 500 MG capsule Take 1 capsule by mouth See Admin Instructions Take 4 1 hour prior to dental work    zoster recombinant adjuvanted vaccine (SHINGRIX) 50 MCG/0.5ML SUSR injection Inject 0.5 mLs into the muscle See Admin Instructions 1 dose now and repeat in 2-6 months    hydroCHLOROthiazide (HYDRODIURIL) 25 MG tablet Take 0.5 tablets by mouth every morning    calcium carbonate 600 MG TABS tablet Take

## 2025-07-16 NOTE — PROGRESS NOTES
AUDIOLOGY EVALUATION    Kalee Farmer had Tympanometry and Audiometry performed today.    The patient reports tinnitus and difficulty understanding speech in the presence of background noise.    Results as follows:    Tympanometry    Type A -  on left    Audiometry    Test Performed - Comprehensive Audiogram    Type of Loss - Right Ear: abnormal hearing: degree of loss is normal to moderate sensorineural hearing loss                           Left Ear: abnormal hearing: degree of loss is normal to moderate sensorineural hearing loss     SRT   Measurement Right Ear Left Ear   Value 20 20   Unit dB dB     Discrimination  Measurement Right Ear Left Ear   Value 92% 96%   Unit dB dB     Recommend  Tinnitus management  Binaural amplification   Annual audios    Maria Elena Chand, CCC-A

## 2025-07-22 ENCOUNTER — RX ONLY (RX ONLY)
Age: 75
End: 2025-07-22

## 2025-07-22 ENCOUNTER — APPOINTMENT (OUTPATIENT)
Dept: URBAN - METROPOLITAN AREA CLINIC 329 | Facility: CLINIC | Age: 75
Setting detail: DERMATOLOGY
End: 2025-07-22

## 2025-07-22 DIAGNOSIS — L82.1 OTHER SEBORRHEIC KERATOSIS: ICD-10-CM

## 2025-07-22 DIAGNOSIS — L81.4 OTHER MELANIN HYPERPIGMENTATION: ICD-10-CM

## 2025-07-22 DIAGNOSIS — L57.0 ACTINIC KERATOSIS: ICD-10-CM

## 2025-07-22 DIAGNOSIS — D22 MELANOCYTIC NEVI: ICD-10-CM

## 2025-07-22 DIAGNOSIS — L73.9 FOLLICULAR DISORDER, UNSPECIFIED: ICD-10-CM | Status: RESOLVED

## 2025-07-22 DIAGNOSIS — D18.0 HEMANGIOMA: ICD-10-CM

## 2025-07-22 DIAGNOSIS — Z85.828 PERSONAL HISTORY OF OTHER MALIGNANT NEOPLASM OF SKIN: ICD-10-CM

## 2025-07-22 PROBLEM — D18.01 HEMANGIOMA OF SKIN AND SUBCUTANEOUS TISSUE: Status: ACTIVE | Noted: 2025-07-22

## 2025-07-22 PROBLEM — D22.5 MELANOCYTIC NEVI OF TRUNK: Status: ACTIVE | Noted: 2025-07-22

## 2025-07-22 PROBLEM — D22.62 MELANOCYTIC NEVI OF LEFT UPPER LIMB, INCLUDING SHOULDER: Status: ACTIVE | Noted: 2025-07-22

## 2025-07-22 PROBLEM — D22.71 MELANOCYTIC NEVI OF RIGHT LOWER LIMB, INCLUDING HIP: Status: ACTIVE | Noted: 2025-07-22

## 2025-07-22 PROCEDURE — ? TREATMENT REGIMEN

## 2025-07-22 PROCEDURE — ? PRESCRIPTION

## 2025-07-22 PROCEDURE — ? MEDICATION COUNSELING

## 2025-07-22 PROCEDURE — ? PHOTO-DOCUMENTATION

## 2025-07-22 PROCEDURE — ? COUNSELING

## 2025-07-22 PROCEDURE — ? PRESCRIPTION MEDICATION MANAGEMENT

## 2025-07-22 PROCEDURE — ? FULL BODY SKIN EXAM

## 2025-07-22 RX ORDER — FLUOROURACIL 0.04 G/G
CREAM TOPICAL
Qty: 40 | Refills: 2 | Status: ERX

## 2025-07-22 RX ORDER — CLINDAMYCIN PHOSPHATE 10 MG/ML
SOLUTION TOPICAL
Qty: 60 | Refills: 5 | Status: ERX | COMMUNITY
Start: 2025-07-22

## 2025-07-22 RX ADMIN — CLINDAMYCIN PHOSPHATE: 10 SOLUTION TOPICAL at 00:00

## 2025-07-22 ASSESSMENT — LOCATION DETAILED DESCRIPTION DERM
LOCATION DETAILED: LEFT MID-UPPER BACK
LOCATION DETAILED: EPIGASTRIC SKIN
LOCATION DETAILED: LEFT DISTAL DORSAL FOREARM
LOCATION DETAILED: RIGHT POSTERIOR SHOULDER
LOCATION DETAILED: RIGHT DISTAL DORSAL FOREARM
LOCATION DETAILED: PERIUMBILICAL SKIN
LOCATION DETAILED: RIGHT ANTERIOR PROXIMAL THIGH
LOCATION DETAILED: LEFT POSTERIOR NECK
LOCATION DETAILED: LEFT RIB CAGE
LOCATION DETAILED: INFERIOR MID FOREHEAD
LOCATION DETAILED: RIGHT INFERIOR UPPER BACK
LOCATION DETAILED: LEFT PROXIMAL PRETIBIAL REGION

## 2025-07-22 ASSESSMENT — LOCATION ZONE DERM
LOCATION ZONE: LEG
LOCATION ZONE: NECK
LOCATION ZONE: ARM
LOCATION ZONE: FACE
LOCATION ZONE: TRUNK

## 2025-07-22 ASSESSMENT — LOCATION SIMPLE DESCRIPTION DERM
LOCATION SIMPLE: LEFT PRETIBIAL REGION
LOCATION SIMPLE: ABDOMEN
LOCATION SIMPLE: LEFT FOREARM
LOCATION SIMPLE: RIGHT UPPER BACK
LOCATION SIMPLE: INFERIOR FOREHEAD
LOCATION SIMPLE: POSTERIOR NECK
LOCATION SIMPLE: RIGHT FOREARM
LOCATION SIMPLE: RIGHT SHOULDER
LOCATION SIMPLE: LEFT UPPER BACK
LOCATION SIMPLE: RIGHT THIGH

## 2025-07-22 NOTE — PROCEDURE: MEDICATION COUNSELING
Topical Steroids Applications Pregnancy And Lactation Text: Most topical steroids are considered safe to use during pregnancy and lactation.  Any topical steroid applied to the breast or nipple should be washed off before breastfeeding.
Olumiant Pregnancy And Lactation Text: Based on animal studies, Olumiant may cause embryo-fetal harm when administered to pregnant women.  The medication should not be used in pregnancy.  Breastfeeding is not recommended during treatment.
Otezla Counseling: The side effects of Otezla were discussed with the patient, including but not limited to worsening or new depression, weight loss, diarrhea, nausea, upper respiratory tract infection, and headache. Patient instructed to call the office should any adverse effect occur.  The patient verbalized understanding of the proper use and possible adverse effects of Otezla.  All the patient's questions and concerns were addressed.
Mirvaso Counseling: Mirvaso is a topical medication which can decrease superficial blood flow where applied. Side effects are uncommon and include stinging, redness and allergic reactions.
Tazorac Counseling:  Patient advised that medication is irritating and drying.  Patient may need to apply sparingly and wash off after an hour before eventually leaving it on overnight.  The patient verbalized understanding of the proper use and possible adverse effects of tazorac.  All of the patient's questions and concerns were addressed.
Quinolones Counseling:  I discussed with the patient the risks of fluoroquinolones including but not limited to GI upset, allergic reaction, drug rash, diarrhea, dizziness, photosensitivity, yeast infections, liver function test abnormalities, tendonitis/tendon rupture.
Eucrisa Pregnancy And Lactation Text: This medication has not been assigned a Pregnancy Risk Category but animal studies failed to show danger with the topical medication. It is unknown if the medication is excreted in breast milk.
Tremfya Pregnancy And Lactation Text: The risk during pregnancy and breastfeeding is uncertain with this medication.
Xolair Pregnancy And Lactation Text: This medication is Pregnancy Category B and is considered safe during pregnancy. This medication is excreted in breast milk.
Dutasteride Female Counseling: Dutasteride Counseling:  I discussed with the patient the risks of use of dutasteride including but not limited to decreased libido and sexual dysfunction. Explained the teratogenic nature of the medication and stressed the importance of not getting pregnant during treatment. All of the patient's questions and concerns were addressed.
Qbrexza Pregnancy And Lactation Text: There is no available data on Qbrexza use in pregnant women.  There is no available data on Qbrexza use in lactation.
Cosentyx Counseling:  I discussed with the patient the risks of Cosentyx including but not limited to worsening of Crohn's disease, immunosuppression, allergic reactions and infections.  The patient understands that monitoring is required including a PPD at baseline and must alert us or the primary physician if symptoms of infection or other concerning signs are noted.
Colchicine Counseling:  Patient counseled regarding adverse effects including but not limited to stomach upset (nausea, vomiting, stomach pain, or diarrhea).  Patient instructed to limit alcohol consumption while taking this medication.  Colchicine may reduce blood counts especially with prolonged use.  The patient understands that monitoring of kidney function and blood counts may be required, especially at baseline. The patient verbalized understanding of the proper use and possible adverse effects of colchicine.  All of the patient's questions and concerns were addressed.
Hyrimoz Pregnancy And Lactation Text: This medication is Pregnancy Category B and is considered safe during pregnancy. It is unknown if this medication is excreted in breast milk.
Zoryve Counseling:  I discussed with the patient that Zoryve is not for use in the eyes, mouth or vagina. The most commonly reported side effects include diarrhea, headache, insomnia, application site pain, upper respiratory tract infections, and urinary tract infections.  All of the patient's questions and concerns were addressed.
Thalidomide Pregnancy And Lactation Text: This medication is Pregnancy Category X and is absolutely contraindicated during pregnancy. It is unknown if it is excreted in breast milk.
Simponi Counseling:  I discussed with the patient the risks of golimumab including but not limited to myelosuppression, immunosuppression, autoimmune hepatitis, demyelinating diseases, lymphoma, and serious infections.  The patient understands that monitoring is required including a PPD at baseline and must alert us or the primary physician if symptoms of infection or other concerning signs are noted.
Fluconazole Pregnancy And Lactation Text: This medication is Pregnancy Category C and it isn't know if it is safe during pregnancy. It is also excreted in breast milk.
Ivermectin Counseling:  Patient instructed to take medication on an empty stomach with a full glass of water.  Patient informed of potential adverse effects including but not limited to nausea, diarrhea, dizziness, itching, and swelling of the extremities or lymph nodes.  The patient verbalized understanding of the proper use and possible adverse effects of ivermectin.  All of the patient's questions and concerns were addressed.
Erythromycin Counseling:  I discussed with the patient the risks of erythromycin including but not limited to GI upset, allergic reaction, drug rash, diarrhea, increase in liver enzymes, and yeast infections.
Azathioprine Counseling:  I discussed with the patient the risks of azathioprine including but not limited to myelosuppression, immunosuppression, hepatotoxicity, lymphoma, and infections.  The patient understands that monitoring is required including baseline LFTs, Creatinine, possible TPMP genotyping and weekly CBCs for the first month and then every 2 weeks thereafter.  The patient verbalized understanding of the proper use and possible adverse effects of azathioprine.  All of the patient's questions and concerns were addressed.
Azelaic Acid Counseling: Patient counseled that medicine may cause skin irritation and to avoid applying near the eyes.  In the event of skin irritation, the patient was advised to reduce the amount of the drug applied or use it less frequently.   The patient verbalized understanding of the proper use and possible adverse effects of azelaic acid.  All of the patient's questions and concerns were addressed.
Azithromycin Pregnancy And Lactation Text: This medication is considered safe during pregnancy and is also secreted in breast milk.
Bexarotene Counseling:  I discussed with the patient the risks of bexarotene including but not limited to hair loss, dry lips/skin/eyes, liver abnormalities, hyperlipidemia, pancreatitis, depression/suicidal ideation, photosensitivity, drug rash/allergic reactions, hypothyroidism, anemia, leukopenia, infection, cataracts, and teratogenicity.  Patient understands that they will need regular blood tests to check lipid profile, liver function tests, white blood cell count, thyroid function tests and pregnancy test if applicable.
Tazorac Pregnancy And Lactation Text: This medication is not safe during pregnancy. It is unknown if this medication is excreted in breast milk.
Topical Sulfur Applications Counseling: Topical Sulfur Counseling: Patient counseled that this medication may cause skin irritation or allergic reactions.  In the event of skin irritation, the patient was advised to reduce the amount of the drug applied or use it less frequently.   The patient verbalized understanding of the proper use and possible adverse effects of topical sulfur application.  All of the patient's questions and concerns were addressed.
Niacinamide Counseling: I recommended taking niacin or niacinamide, also know as vitamin B3, twice daily. Recent evidence suggests that taking vitamin B3 (500 mg twice daily) can reduce the risk of actinic keratoses and non-melanoma skin cancers. Side effects of vitamin B3 include flushing and headache.
Methotrexate Pregnancy And Lactation Text: This medication is Pregnancy Category X and is known to cause fetal harm. This medication is excreted in breast milk.
Dapsone Pregnancy And Lactation Text: This medication is Pregnancy Category C and is not considered safe during pregnancy or breast feeding.
Ilumya Counseling: I discussed with the patient the risks of tildrakizumab including but not limited to immunosuppression, malignancy, posterior leukoencephalopathy syndrome, and serious infections.  The patient understands that monitoring is required including a PPD at baseline and must alert us or the primary physician if symptoms of infection or other concerning signs are noted.
Dutasteride Pregnancy And Lactation Text: This medication is absolutely contraindicated in women, especially during pregnancy and breast feeding. Feminization of male fetuses is possible if taking while pregnant.
Mirvaso Pregnancy And Lactation Text: This medication has not been assigned a Pregnancy Risk Category. It is unknown if the medication is excreted in breast milk.
Zoryve Pregnancy And Lactation Text: It is unknown if this medication can cause problems during pregnancy and breastfeeding.
Cimetidine Counseling:  I discussed with the patient the risks of Cimetidine including but not limited to gynecomastia, headache, diarrhea, nausea, drowsiness, arrhythmias, pancreatitis, skin rashes, psychosis, bone marrow suppression and kidney toxicity.
Otezla Pregnancy And Lactation Text: This medication is Pregnancy Category C and it isn't known if it is safe during pregnancy. It is unknown if it is excreted in breast milk.
Rinvoq Counseling: I discussed with the patient the risks of Rinvoq therapy including but not limited to upper respiratory tract infections, shingles, cold sores, bronchitis, nausea, cough, fever, acne, and headache. Live vaccines should be avoided.  This medication has been linked to serious infections; higher rate of mortality; malignancy and lymphoproliferative disorders; major adverse cardiovascular events; thrombosis; thrombocytopenia, anemia, and neutropenia; lipid elevations; liver enzyme elevations; and gastrointestinal perforations.
Hydroquinone Counseling:  Patient advised that medication may result in skin irritation, lightening (hypopigmentation), dryness, and burning.  In the event of skin irritation, the patient was advised to reduce the amount of the drug applied or use it less frequently.  Rarely, spots that are treated with hydroquinone can become darker (pseudoochronosis).  Should this occur, patient instructed to stop medication and call the office. The patient verbalized understanding of the proper use and possible adverse effects of hydroquinone.  All of the patient's questions and concerns were addressed.
Rhofade Counseling: Rhofade is a topical medication which can decrease superficial blood flow where applied. Side effects are uncommon and include stinging, redness and allergic reactions.
Tranexamic Acid Counseling:  Patient advised of the small risk of bleeding problems with tranexamic acid. They were also instructed to call if they developed any nausea, vomiting or diarrhea. All of the patient's questions and concerns were addressed.
Ivermectin Pregnancy And Lactation Text: This medication is Pregnancy Category C and it isn't known if it is safe during pregnancy. It is also excreted in breast milk.
Bactrim Counseling:  I discussed with the patient the risks of sulfa antibiotics including but not limited to GI upset, allergic reaction, drug rash, diarrhea, dizziness, photosensitivity, and yeast infections.  Rarely, more serious reactions can occur including but not limited to aplastic anemia, agranulocytosis, methemoglobinemia, blood dyscrasias, liver or kidney failure, lung infiltrates or desquamative/blistering drug rashes.
Bexarotene Pregnancy And Lactation Text: This medication is Pregnancy Category X and should not be given to women who are pregnant or may become pregnant. This medication should not be used if you are breast feeding.
Griseofulvin Counseling:  I discussed with the patient the risks of griseofulvin including but not limited to photosensitivity, cytopenia, liver damage, nausea/vomiting and severe allergy.  The patient understands that this medication is best absorbed when taken with a fatty meal (e.g., ice cream or french fries).
Prednisone Counseling:  I discussed with the patient the risks of prolonged use of prednisone including but not limited to weight gain, insomnia, osteoporosis, mood changes, diabetes, susceptibility to infection, glaucoma and high blood pressure.  In cases where prednisone use is prolonged, patients should be monitored with blood pressure checks, serum glucose levels and an eye exam.  Additionally, the patient may need to be placed on GI prophylaxis, PCP prophylaxis, and calcium and vitamin D supplementation and/or a bisphosphonate.  The patient verbalized understanding of the proper use and the possible adverse effects of prednisone.  All of the patient's questions and concerns were addressed.
Erivedge Counseling- I discussed with the patient the risks of Erivedge including but not limited to nausea, vomiting, diarrhea, constipation, weight loss, changes in the sense of taste, decreased appetite, muscle spasms, and hair loss.  The patient verbalized understanding of the proper use and possible adverse effects of Erivedge.  All of the patient's questions and concerns were addressed.
Azelaic Acid Pregnancy And Lactation Text: This medication is considered safe during pregnancy and breast feeding.
Erythromycin Pregnancy And Lactation Text: This medication is Pregnancy Category B and is considered safe during pregnancy. It is also excreted in breast milk.
Cimetidine Pregnancy And Lactation Text: This medication is Pregnancy Category B and is considered safe during pregnancy. It is also excreted in breast milk and breast feeding isn't recommended.
Topical Clindamycin Counseling: Patient counseled that this medication may cause skin irritation or allergic reactions.  In the event of skin irritation, the patient was advised to reduce the amount of the drug applied or use it less frequently.   The patient verbalized understanding of the proper use and possible adverse effects of clindamycin.  All of the patient's questions and concerns were addressed.
Gabapentin Counseling: I discussed with the patient the risks of gabapentin including but not limited to dizziness, somnolence, fatigue and ataxia.
Azathioprine Pregnancy And Lactation Text: This medication is Pregnancy Category D and isn't considered safe during pregnancy. It is unknown if this medication is excreted in breast milk.
Niacinamide Pregnancy And Lactation Text: These medications are considered safe during pregnancy.
Rifampin Counseling: I discussed with the patient the risks of rifampin including but not limited to liver damage, kidney damage, red-orange body fluids, nausea/vomiting and severe allergy.
Topical Sulfur Applications Pregnancy And Lactation Text: This medication is considered safe during pregnancy and breast feeding secondary to limited systemic absorption.
Dupixent Counseling: I discussed with the patient the risks of dupilumab including but not limited to eye infection and irritation, cold sores, injection site reactions, worsening of asthma, allergic reactions and increased risk of parasitic infection.  Live vaccines should be avoided while taking dupilumab. Dupilumab will also interact with certain medications such as warfarin and cyclosporine. The patient understands that monitoring is required and they must alert us or the primary physician if symptoms of infection or other concerning signs are noted.
Rinvoq Pregnancy And Lactation Text: Based on animal studies, Rinvoq may cause embryo-fetal harm when administered to pregnant women.  The medication should not be used in pregnancy.  Breastfeeding is not recommended during treatment and for 6 days after the last dose.
Opzelura Counseling:  I discussed with the patient the risks of Opzelura including but not limited to nasopharngitis, bronchitis, ear infection, eosinophila, hives, diarrhea, folliculitis, tonsillitis, and rhinorrhea.  Taken orally, this medication has been linked to serious infections; higher rate of mortality; malignancy and lymphoproliferative disorders; major adverse cardiovascular events; thrombosis; thrombocytopenia, anemia, and neutropenia; and lipid elevations.
Oxybutynin Counseling:  I discussed with the patient the risks of oxybutynin including but not limited to skin rash, drowsiness, dry mouth, difficulty urinating, and blurred vision.
Zyclara Counseling:  I discussed with the patient the risks of imiquimod including but not limited to erythema, scaling, itching, weeping, crusting, and pain.  Patient understands that the inflammatory response to imiquimod is variable from person to person and was educated regarded proper titration schedule.  If flu-like symptoms develop, patient knows to discontinue the medication and contact us.
Skyrizi Counseling: I discussed with the patient the risks of risankizumab-rzaa including but not limited to immunosuppression, and serious infections.  The patient understands that monitoring is required including a PPD at baseline and must alert us or the primary physician if symptoms of infection or other concerning signs are noted.
5-Fu Counseling: 5-Fluorouracil Counseling:  I discussed with the patient the risks of 5-fluorouracil including but not limited to erythema, scaling, itching, weeping, crusting, and pain.
Finasteride Male Counseling: Finasteride Counseling:  I discussed with the patient the risks of use of finasteride including but not limited to decreased libido, decreased ejaculate volume, gynecomastia, and depression. Women should not handle medication.  All of the patient's questions and concerns were addressed.
Bactrim Pregnancy And Lactation Text: This medication is Pregnancy Category D and is known to cause fetal risk.  It is also excreted in breast milk.
Isotretinoin Counseling: Patient should get monthly blood tests, not donate blood, not drive at night if vision affected, not share medication, and not undergo elective surgery for 6 months after tx completed. Side effects reviewed, pt to contact office should one occur.
Tranexamic Acid Pregnancy And Lactation Text: It is unknown if this medication is safe during pregnancy or breast feeding.
Prednisone Pregnancy And Lactation Text: This medication is Pregnancy Category C and it isn't know if it is safe during pregnancy. This medication is excreted in breast milk.
Griseofulvin Pregnancy And Lactation Text: This medication is Pregnancy Category X and is known to cause serious birth defects. It is unknown if this medication is excreted in breast milk but breast feeding should be avoided.
Gabapentin Pregnancy And Lactation Text: This medication is Pregnancy Category C and isn't considered safe during pregnancy. It is excreted in breast milk.
Cellcept Counseling:  I discussed with the patient the risks of mycophenolate mofetil including but not limited to infection/immunosuppression, GI upset, hypokalemia, hypercholesterolemia, bone marrow suppression, lymphoproliferative disorders, malignancy, GI ulceration/bleed/perforation, colitis, interstitial lung disease, kidney failure, progressive multifocal leukoencephalopathy, and birth defects.  The patient understands that monitoring is required including a baseline creatinine and regular CBC testing. In addition, patient must alert us immediately if symptoms of infection or other concerning signs are noted.
Doxepin Counseling:  Patient advised that the medication is sedating and not to drive a car after taking this medication. Patient informed of potential adverse effects including but not limited to dry mouth, urinary retention, and blurry vision.  The patient verbalized understanding of the proper use and possible adverse effects of doxepin.  All of the patient's questions and concerns were addressed.
Benzoyl Peroxide Counseling: Patient counseled that medicine may cause skin irritation and bleach clothing.  In the event of skin irritation, the patient was advised to reduce the amount of the drug applied or use it less frequently.   The patient verbalized understanding of the proper use and possible adverse effects of benzoyl peroxide.  All of the patient's questions and concerns were addressed.
Metronidazole Counseling:  I discussed with the patient the risks of metronidazole including but not limited to seizures, nausea/vomiting, a metallic taste in the mouth, nausea/vomiting and severe allergy.
Solaraze Counseling:  I discussed with the patient the risks of Solaraze including but not limited to erythema, scaling, itching, weeping, crusting, and pain.
Wartpeel Counseling:  I discussed with the patient the risks of Wartpeel including but not limited to erythema, scaling, itching, weeping, crusting, and pain.
Opzelura Pregnancy And Lactation Text: There is insufficient data to evaluate drug-associated risk for major birth defects, miscarriage, or other adverse maternal or fetal outcomes.  There is a pregnancy registry that monitors pregnancy outcomes in pregnant persons exposed to the medication during pregnancy.  It is unknown if this medication is excreted in breast milk.  Do not breastfeed during treatment and for about 4 weeks after the last dose.
Nsaids Counseling: NSAID Counseling: I discussed with the patient that NSAIDs should be taken with food. Prolonged use of NSAIDs can result in the development of stomach ulcers.  Patient advised to stop taking NSAIDs if abdominal pain occurs.  The patient verbalized understanding of the proper use and possible adverse effects of NSAIDs.  All of the patient's questions and concerns were addressed.
Oxybutynin Pregnancy And Lactation Text: This medication is Pregnancy Category B and is considered safe during pregnancy. It is unknown if it is excreted in breast milk.
Adbry Counseling: I discussed with the patient the risks of tralokinumab including but not limited to eye infection and irritation, cold sores, injection site reactions, worsening of asthma, allergic reactions and increased risk of parasitic infection.  Live vaccines should be avoided while taking tralokinumab. The patient understands that monitoring is required and they must alert us or the primary physician if symptoms of infection or other concerning signs are noted.
Rifampin Pregnancy And Lactation Text: This medication is Pregnancy Category C and it isn't know if it is safe during pregnancy. It is also excreted in breast milk and should not be used if you are breast feeding.
Dupixent Pregnancy And Lactation Text: This medication likely crosses the placenta but the risk for the fetus is uncertain. This medication is excreted in breast milk.
Finasteride Female Counseling: Finasteride Counseling:  I discussed with the patient the risks of use of finasteride including but not limited to decreased libido and sexual dysfunction. Explained the teratogenic nature of the medication and stressed the importance of not getting pregnant during treatment. All of the patient's questions and concerns were addressed.
Sotyktu Counseling:  I discussed the most common side effects of Sotyktu including: common cold, sore throat, sinus infections, cold sores, canker sores, folliculitis, and acne.  I also discussed more serious side effects of Sotyktu including but not limited to: serious allergic reactions; increased risk for infections such as TB; cancers such as lymphomas; rhabdomyolysis and elevated CPK; and elevated triglycerides and liver enzymes. 
Valtrex Counseling: I discussed with the patient the risks of valacyclovir including but not limited to kidney damage, nausea, vomiting and severe allergy.  The patient understands that if the infection seems to be worsening or is not improving, they are to call.
Imiquimod Counseling:  I discussed with the patient the risks of imiquimod including but not limited to erythema, scaling, itching, weeping, crusting, and pain.  Patient understands that the inflammatory response to imiquimod is variable from person to person and was educated regarded proper titration schedule.  If flu-like symptoms develop, patient knows to discontinue the medication and contact us.
5-Fu Pregnancy And Lactation Text: This medication is Pregnancy Category X and contraindicated in pregnancy and in women who may become pregnant. It is unknown if this medication is excreted in breast milk.
Infliximab Counseling:  I discussed with the patient the risks of infliximab including but not limited to myelosuppression, immunosuppression, autoimmune hepatitis, demyelinating diseases, lymphoma, and serious infections.  The patient understands that monitoring is required including a PPD at baseline and must alert us or the primary physician if symptoms of infection or other concerning signs are noted.
Zyclara Pregnancy And Lactation Text: This medication is Pregnancy Category C. It is unknown if this medication is excreted in breast milk.
Cephalexin Counseling: I counseled the patient regarding use of cephalexin as an antibiotic for prophylactic and/or therapeutic purposes. Cephalexin (commonly prescribed under brand name Keflex) is a cephalosporin antibiotic which is active against numerous classes of bacteria, including most skin bacteria. Side effects may include nausea, diarrhea, gastrointestinal upset, rash, hives, yeast infections, and in rare cases, hepatitis, kidney disease, seizures, fever, confusion, neurologic symptoms, and others. Patients with severe allergies to penicillin medications are cautioned that there is about a 10% incidence of cross-reactivity with cephalosporins. When possible, patients with penicillin allergies should use alternatives to cephalosporins for antibiotic therapy.
Isotretinoin Pregnancy And Lactation Text: This medication is Pregnancy Category X and is considered extremely dangerous during pregnancy. It is unknown if it is excreted in breast milk.
Libtayo Counseling- I discussed with the patient the risks of Libtayo including but not limited to nausea, vomiting, diarrhea, and bone or muscle pain.  The patient verbalized understanding of the proper use and possible adverse effects of Libtayo.  All of the patient's questions and concerns were addressed.
Itraconazole Counseling:  I discussed with the patient the risks of itraconazole including but not limited to liver damage, nausea/vomiting, neuropathy, and severe allergy.  The patient understands that this medication is best absorbed when taken with acidic beverages such as non-diet cola or ginger ale.  The patient understands that monitoring is required including baseline LFTs and repeat LFTs at intervals.  The patient understands that they are to contact us or the primary physician if concerning signs are noted.
Metronidazole Pregnancy And Lactation Text: This medication is Pregnancy Category B and considered safe during pregnancy.  It is also excreted in breast milk.
Glycopyrrolate Counseling:  I discussed with the patient the risks of glycopyrrolate including but not limited to skin rash, drowsiness, dry mouth, difficulty urinating, and blurred vision.
Benzoyl Peroxide Pregnancy And Lactation Text: This medication is Pregnancy Category C. It is unknown if benzoyl peroxide is excreted in breast milk.
Doxepin Pregnancy And Lactation Text: This medication is Pregnancy Category C and it isn't known if it is safe during pregnancy. It is also excreted in breast milk and breast feeding isn't recommended.
Picato Counseling:  I discussed with the patient the risks of Picato including but not limited to erythema, scaling, itching, weeping, crusting, and pain.
Sotyktu Pregnancy And Lactation Text: There is insufficient data to evaluate whether or not Sotyktu is safe to use during pregnancy.   It is not known if Sotyktu passes into breast milk and whether or not it is safe to use when breastfeeding.  
Topical Ketoconazole Counseling: Patient counseled that this medication may cause skin irritation or allergic reactions.  In the event of skin irritation, the patient was advised to reduce the amount of the drug applied or use it less frequently.   The patient verbalized understanding of the proper use and possible adverse effects of ketoconazole.  All of the patient's questions and concerns were addressed.
Sarecycline Counseling: Patient advised regarding possible photosensitivity and discoloration of the teeth, skin, lips, tongue and gums.  Patient instructed to avoid sunlight, if possible.  When exposed to sunlight, patients should wear protective clothing, sunglasses, and sunscreen.  The patient was instructed to call the office immediately if the following severe adverse effects occur:  hearing changes, easy bruising/bleeding, severe headache, or vision changes.  The patient verbalized understanding of the proper use and possible adverse effects of sarecycline.  All of the patient's questions and concerns were addressed.
Nsaids Pregnancy And Lactation Text: These medications are considered safe up to 30 weeks gestation. It is excreted in breast milk.
Drysol Counseling:  I discussed with the patient the risks of drysol/aluminum chloride including but not limited to skin rash, itching, irritation, burning.
Finasteride Pregnancy And Lactation Text: This medication is absolutely contraindicated during pregnancy. It is unknown if it is excreted in breast milk.
Adbry Pregnancy And Lactation Text: It is unknown if this medication will adversely affect pregnancy or breast feeding.
Solaraze Pregnancy And Lactation Text: This medication is Pregnancy Category B and is considered safe. There is some data to suggest avoiding during the third trimester. It is unknown if this medication is excreted in breast milk.
Enbrel Counseling:  I discussed with the patient the risks of etanercept including but not limited to myelosuppression, immunosuppression, autoimmune hepatitis, demyelinating diseases, lymphoma, and infections.  The patient understands that monitoring is required including a PPD at baseline and must alert us or the primary physician if symptoms of infection or other concerning signs are noted.
Propranolol Counseling:  I discussed with the patient the risks of propranolol including but not limited to low heart rate, low blood pressure, low blood sugar, restlessness and increased cold sensitivity. They should call the office if they experience any of these side effects.
Cibinqo Counseling: I discussed with the patient the risks of Cibinqo therapy including but not limited to common cold, nausea, headache, cold sores, increased blood CPK levels, dizziness, UTIs, fatigue, acne, and vomitting. Live vaccines should be avoided.  This medication has been linked to serious infections; higher rate of mortality; malignancy and lymphoproliferative disorders; major adverse cardiovascular events; thrombosis; thrombocytopenia and lymphopenia; lipid elevations; and retinal detachment.
Detail Level: Zone
Stelara Counseling:  I discussed with the patient the risks of ustekinumab including but not limited to immunosuppression, malignancy, posterior leukoencephalopathy syndrome, and serious infections.  The patient understands that monitoring is required including a PPD at baseline and must alert us or the primary physician if symptoms of infection or other concerning signs are noted.
Cephalexin Pregnancy And Lactation Text: This medication is Pregnancy Category B and considered safe during pregnancy.  It is also excreted in breast milk but can be used safely for shorter doses.
High Dose Vitamin A Counseling: Side effects reviewed, pt to contact office should one occur.
Valtrex Pregnancy And Lactation Text: this medication is Pregnancy Category B and is considered safe during pregnancy. This medication is not directly found in breast milk but it's metabolite acyclovir is present.
Libtayo Pregnancy And Lactation Text: This medication is contraindicated in pregnancy and when breast feeding.
Cyclophosphamide Counseling:  I discussed with the patient the risks of cyclophosphamide including but not limited to hair loss, hormonal abnormalities, decreased fertility, abdominal pain, diarrhea, nausea and vomiting, bone marrow suppression and infection. The patient understands that monitoring is required while taking this medication.
Glycopyrrolate Pregnancy And Lactation Text: This medication is Pregnancy Category B and is considered safe during pregnancy. It is unknown if it is excreted breast milk.
Opioid Counseling: I discussed with the patient the potential side effects of opioids including but not limited to addiction, altered mental status, and depression. I stressed avoiding alcohol, benzodiazepines, muscle relaxants and sleep aids unless specifically okayed by a physician. The patient verbalized understanding of the proper use and possible adverse effects of opioids. All of the patient's questions and concerns were addressed. They were instructed to flush the remaining pills down the toilet if they did not need them for pain.
Carac Counseling:  I discussed with the patient the risks of Carac including but not limited to erythema, scaling, itching, weeping, crusting, and pain.
Minocycline Counseling: Patient advised regarding possible photosensitivity and discoloration of the teeth, skin, lips, tongue and gums.  Patient instructed to avoid sunlight, if possible.  When exposed to sunlight, patients should wear protective clothing, sunglasses, and sunscreen.  The patient was instructed to call the office immediately if the following severe adverse effects occur:  hearing changes, easy bruising/bleeding, severe headache, or vision changes.  The patient verbalized understanding of the proper use and possible adverse effects of minocycline.  All of the patient's questions and concerns were addressed.
Winlevi Counseling:  I discussed with the patient the risks of topical clascoterone including but not limited to erythema, scaling, itching, and stinging. Patient voiced their understanding.
Xeljanz Counseling: I discussed with the patient the risks of Xeljanz therapy including increased risk of infection, liver issues, headache, diarrhea, or cold symptoms. Live vaccines should be avoided. They were instructed to call if they have any problems.
Cibinqo Pregnancy And Lactation Text: It is unknown if this medication will adversely affect pregnancy or breast feeding.  You should not take this medication if you are currently pregnant or planning a pregnancy or while breastfeeding.
Hydroxyzine Counseling: Patient advised that the medication is sedating and not to drive a car after taking this medication.  Patient informed of potential adverse effects including but not limited to dry mouth, urinary retention, and blurry vision.  The patient verbalized understanding of the proper use and possible adverse effects of hydroxyzine.  All of the patient's questions and concerns were addressed.
Klisyri Counseling:  I discussed with the patient the risks of Klisyri including but not limited to erythema, scaling, itching, weeping, crusting, and pain.
Propranolol Pregnancy And Lactation Text: This medication is Pregnancy Category C and it isn't known if it is safe during pregnancy. It is excreted in breast milk.
Birth Control Pills Counseling: Birth Control Pill Counseling: I discussed with the patient the potential side effects of OCPs including but not limited to increased risk of stroke, heart attack, thrombophlebitis, deep venous thrombosis, hepatic adenomas, breast changes, GI upset, headaches, and depression.  The patient verbalized understanding of the proper use and possible adverse effects of OCPs. All of the patient's questions and concerns were addressed.
Sarecycline Pregnancy And Lactation Text: This medication is Pregnancy Category D and not consider safe during pregnancy. It is also excreted in breast milk.
Soolantra Counseling: I discussed with the patients the risks of topial Soolantra. This is a medicine which decreases the number of mites and inflammation in the skin. You experience burning, stinging, eye irritation or allergic reactions.  Please call our office if you develop any problems from using this medication.
Bimzelx Counseling:  I discussed with the patient the risks of Bimzelx including but not limited to depression, immunosuppression, allergic reactions and infections.  The patient understands that monitoring is required including a PPD at baseline and must alert us or the primary physician if symptoms of infection or other concerning signs are noted.
Olanzapine Counseling- I discussed with the patient the common side effects of olanzapine including but are not limited to: lack of energy, dry mouth, increased appetite, sleepiness, tremor, constipation, dizziness, changes in behavior, or restlessness.  Explained that teenagers are more likely to experience headaches, abdominal pain, pain in the arms or legs, tiredness, and sleepiness.  Serious side effects include but are not limited: increased risk of death in elderly patients who are confused, have memory loss, or dementia-related psychosis; hyperglycemia; increased cholesterol and triglycerides; and weight gain.
Include Pregnancy/Lactation Warning?: No
Clindamycin Counseling: I counseled the patient regarding use of clindamycin as an antibiotic for prophylactic and/or therapeutic purposes. Clindamycin is active against numerous classes of bacteria, including skin bacteria. Side effects may include nausea, diarrhea, gastrointestinal upset, rash, hives, yeast infections, and in rare cases, colitis.
Ketoconazole Counseling:   Patient counseled regarding improving absorption with orange juice.  Adverse effects include but are not limited to breast enlargement, headache, diarrhea, nausea, upset stomach, liver function test abnormalities, taste disturbance, and stomach pain.  There is a rare possibility of liver failure that can occur when taking ketoconazole. The patient understands that monitoring of LFTs may be required, especially at baseline. The patient verbalized understanding of the proper use and possible adverse effects of ketoconazole.  All of the patient's questions and concerns were addressed.
Rituxan Counseling:  I discussed with the patient the risks of Rituxan infusions. Side effects can include infusion reactions, severe drug rashes including mucocutaneous reactions, reactivation of latent hepatitis and other infections and rarely progressive multifocal leukoencephalopathy.  All of the patient's questions and concerns were addressed.
High Dose Vitamin A Pregnancy And Lactation Text: High dose vitamin A therapy is contraindicated during pregnancy and breast feeding.
Odomzo Counseling- I discussed with the patient the risks of Odomzo including but not limited to nausea, vomiting, diarrhea, constipation, weight loss, changes in the sense of taste, decreased appetite, muscle spasms, and hair loss.  The patient verbalized understanding of the proper use and possible adverse effects of Odomzo.  All of the patient's questions and concerns were addressed.
Soolantra Pregnancy And Lactation Text: This medication is Pregnancy Category C. This medication is considered safe during breast feeding.
Topical Metronidazole Counseling: Metronidazole is a topical antibiotic medication. You may experience burning, stinging, redness, or allergic reactions.  Please call our office if you develop any problems from using this medication.
Cyclophosphamide Pregnancy And Lactation Text: This medication is Pregnancy Category D and it isn't considered safe during pregnancy. This medication is excreted in breast milk.
Hydroxychloroquine Counseling:  I discussed with the patient that a baseline ophthalmologic exam is needed at the start of therapy and every year thereafter while on therapy. A CBC may also be warranted for monitoring.  The side effects of this medication were discussed with the patient, including but not limited to agranulocytosis, aplastic anemia, seizures, rashes, retinopathy, and liver toxicity. Patient instructed to call the office should any adverse effect occur.  The patient verbalized understanding of the proper use and possible adverse effects of Plaquenil.  All the patient's questions and concerns were addressed.
Winlevi Pregnancy And Lactation Text: This medication is considered safe during pregnancy and breastfeeding.
Tetracycline Counseling: Patient counseled regarding possible photosensitivity and increased risk for sunburn.  Patient instructed to avoid sunlight, if possible.  When exposed to sunlight, patients should wear protective clothing, sunglasses, and sunscreen.  The patient was instructed to call the office immediately if the following severe adverse effects occur:  hearing changes, easy bruising/bleeding, severe headache, or vision changes.  The patient verbalized understanding of the proper use and possible adverse effects of tetracycline.  All of the patient's questions and concerns were addressed. Patient understands to avoid pregnancy while on therapy due to potential birth defects.
Opioid Pregnancy And Lactation Text: These medications can lead to premature delivery and should be avoided during pregnancy. These medications are also present in breast milk in small amounts.
Hydroxyzine Pregnancy And Lactation Text: This medication is not safe during pregnancy and should not be taken. It is also excreted in breast milk and breast feeding isn't recommended.
Humira Counseling:  I discussed with the patient the risks of adalimumab including but not limited to myelosuppression, immunosuppression, autoimmune hepatitis, demyelinating diseases, lymphoma, and serious infections.  The patient understands that monitoring is required including a PPD at baseline and must alert us or the primary physician if symptoms of infection or other concerning signs are noted.
Rituxan Pregnancy And Lactation Text: This medication is Pregnancy Category C and it isn't know if it is safe during pregnancy. It is unknown if this medication is excreted in breast milk but similar antibodies are known to be excreted.
Xelcoyz Pregnancy And Lactation Text: This medication is Pregnancy Category D and is not considered safe during pregnancy.  The risk during breast feeding is also uncertain.
Olanzapine Pregnancy And Lactation Text: This medication is pregnancy category C.   There are no adequate and well controlled trials with olanzapine in pregnant females.  Olanzapine should be used during pregnancy only if the potential benefit justifies the potential risk to the fetus.   In a study in lactating healthy women, olanzapine was excreted in breast milk.  It is recommended that women taking olanzapine should not breast feed.
Klisyri Pregnancy And Lactation Text: It is unknown if this medication can harm a developing fetus or if it is excreted in breast milk.
Taltz Counseling: I discussed with the patient the risks of ixekizumab including but not limited to immunosuppression, serious infections, worsening of inflammatory bowel disease and drug reactions.  The patient understands that monitoring is required including a PPD at baseline and must alert us or the primary physician if symptoms of infection or other concerning signs are noted.
Litfulo Counseling: I discussed with the patient the risks of Litfulo therapy including but not limited to upper respiratory tract infections, shingles, cold sores, and nausea. Live vaccines should be avoided.  This medication has been linked to serious infections; higher rate of mortality; malignancy and lymphoproliferative disorders; major adverse cardiovascular events; thrombosis; gastrointestinal perforations; neutropenia; lymphopenia; anemia; liver enzyme elevations; and lipid elevations.
SSKI Counseling:  I discussed with the patient the risks of SSKI including but not limited to thyroid abnormalities, metallic taste, GI upset, fever, headache, acne, arthralgias, paraesthesias, lymphadenopathy, easy bleeding, arrhythmias, and allergic reaction.
Elidel Counseling: Patient may experience a mild burning sensation during topical application. Elidel is not approved in children less than 2 years of age. There have been case reports of hematologic and skin malignancies in patients using topical calcineurin inhibitors although causality is questionable.
Protopic Counseling: Patient may experience a mild burning sensation during topical application. Protopic is not approved in children less than 2 years of age. There have been case reports of hematologic and skin malignancies in patients using topical calcineurin inhibitors although causality is questionable.
Birth Control Pills Pregnancy And Lactation Text: This medication should be avoided if pregnant and for the first 30 days post-partum.
Bimzelx Pregnancy And Lactation Text: This medication crosses the placenta and the safety is uncertain during pregnancy. It is unknown if this medication is present in breast milk.
Clindamycin Pregnancy And Lactation Text: This medication can be used in pregnancy if certain situations. Clindamycin is also present in breast milk.
Ketoconazole Pregnancy And Lactation Text: This medication is Pregnancy Category C and it isn't know if it is safe during pregnancy. It is also excreted in breast milk and breast feeding isn't recommended.
Hydroxychloroquine Pregnancy And Lactation Text: This medication has been shown to cause fetal harm but it isn't assigned a Pregnancy Risk Category. There are small amounts excreted in breast milk.
Cyclosporine Counseling:  I discussed with the patient the risks of cyclosporine including but not limited to hypertension, gingival hyperplasia,myelosuppression, immunosuppression, liver damage, kidney damage, neurotoxicity, lymphoma, and serious infections. The patient understands that monitoring is required including baseline blood pressure, CBC, CMP, lipid panel and uric acid, and then 1-2 times monthly CMP and blood pressure.
Topical Retinoid counseling:  Patient advised to apply a pea-sized amount only at bedtime and wait 30 minutes after washing their face before applying.  If too drying, patient may add a non-comedogenic moisturizer. The patient verbalized understanding of the proper use and possible adverse effects of retinoids.  All of the patient's questions and concerns were addressed.
Oral Minoxidil Counseling- I discussed with the patient the risks of oral minoxidil including but not limited to shortness of breath, swelling of the feet or ankles, dizziness, lightheadedness, unwanted hair growth and allergic reaction.  The patient verbalized understanding of the proper use and possible adverse effects of oral minoxidil.  All of the patient's questions and concerns were addressed.
Protopic Pregnancy And Lactation Text: This medication is Pregnancy Category C. It is unknown if this medication is excreted in breast milk when applied topically.
Litfulo Pregnancy And Lactation Text: Based on animal studies, Lifulo may cause embryo-fetal harm when administered to pregnant women.  The medication should not be used in pregnancy.  Breastfeeding is not recommended during treatment.
Clofazimine Counseling:  I discussed with the patient the risks of clofazimine including but not limited to skin and eye pigmentation, liver damage, nausea/vomiting, gastrointestinal bleeding and allergy.
Calcipotriene Counseling:  I discussed with the patient the risks of calcipotriene including but not limited to erythema, scaling, itching, and irritation.
Spironolactone Counseling: Patient advised regarding risks of diarrhea, abdominal pain, hyperkalemia, birth defects (for female patients), liver toxicity and renal toxicity. The patient may need blood work to monitor liver and kidney function and potassium levels while on therapy. The patient verbalized understanding of the proper use and possible adverse effects of spironolactone.  All of the patient's questions and concerns were addressed.
Topical Metronidazole Pregnancy And Lactation Text: This medication is Pregnancy Category B and considered safe during pregnancy.  It is also considered safe to use while breastfeeding.
Cimzia Counseling:  I discussed with the patient the risks of Cimzia including but not limited to immunosuppression, allergic reactions and infections.  The patient understands that monitoring is required including a PPD at baseline and must alert us or the primary physician if symptoms of infection or other concerning signs are noted.
Sski Pregnancy And Lactation Text: This medication is Pregnancy Category D and isn't considered safe during pregnancy. It is excreted in breast milk.
Minoxidil Counseling: Minoxidil is a topical medication which can increase blood flow where it is applied. It is uncertain how this medication increases hair growth. Side effects are uncommon and include stinging and allergic reactions.
VTAMA Counseling: I discussed with the patient that VTAMA is not for use in the eyes, mouth or mouth. They should call the office if they develop any signs of allergic reactions to VTAMA. The patient verbalized understanding of the proper use and possible adverse effects of VTAMA.  All of the patient's questions and concerns were addressed.
Siliq Counseling:  I discussed with the patient the risks of Siliq including but not limited to new or worsening depression, suicidal thoughts and behavior, immunosuppression, malignancy, posterior leukoencephalopathy syndrome, and serious infections.  The patient understands that monitoring is required including a PPD at baseline and must alert us or the primary physician if symptoms of infection or other concerning signs are noted. There is also a special program designed to monitor depression which is required with Siliq.
Arava Counseling:  Patient counseled regarding adverse effects of Arava including but not limited to nausea, vomiting, abnormalities in liver function tests. Patients may develop mouth sores, rash, diarrhea, and abnormalities in blood counts. The patient understands that monitoring is required including LFTs and blood counts.  There is a rare possibility of scarring of the liver and lung problems that can occur when taking methotrexate. Persistent nausea, loss of appetite, pale stools, dark urine, cough, and shortness of breath should be reported immediately. Patient advised to discontinue Arava treatment and consult with a physician prior to attempting conception. The patient will have to undergo a treatment to eliminate Arava from the body prior to conception.
Calcipotriene Pregnancy And Lactation Text: The use of this medication during pregnancy or lactation is not recommended as there is insufficient data.
Aklief counseling:  Patient advised to apply a pea-sized amount only at bedtime and wait 30 minutes after washing their face before applying.  If too drying, patient may add a non-comedogenic moisturizer.  The most commonly reported side effects including irritation, redness, scaling, dryness, stinging, burning, itching, and increased risk of sunburn.  The patient verbalized understanding of the proper use and possible adverse effects of retinoids.  All of the patient's questions and concerns were addressed.
Doxycycline Counseling:  Patient counseled regarding possible photosensitivity and increased risk for sunburn.  Patient instructed to avoid sunlight, if possible.  When exposed to sunlight, patients should wear protective clothing, sunglasses, and sunscreen.  The patient was instructed to call the office immediately if the following severe adverse effects occur:  hearing changes, easy bruising/bleeding, severe headache, or vision changes.  The patient verbalized understanding of the proper use and possible adverse effects of doxycycline.  All of the patient's questions and concerns were addressed.
Acitretin Counseling:  I discussed with the patient the risks of acitretin including but not limited to hair loss, dry lips/skin/eyes, liver damage, hyperlipidemia, depression/suicidal ideation, photosensitivity.  Serious rare side effects can include but are not limited to pancreatitis, pseudotumor cerebri, bony changes, clot formation/stroke/heart attack.  Patient understands that alcohol is contraindicated since it can result in liver toxicity and significantly prolong the elimination of the drug by many years.
Terbinafine Counseling: Patient counseling regarding adverse effects of terbinafine including but not limited to headache, diarrhea, rash, upset stomach, liver function test abnormalities, itching, taste/smell disturbance, nausea, abdominal pain, and flatulence.  There is a rare possibility of liver failure that can occur when taking terbinafine.  The patient understands that a baseline LFT and kidney function test may be required. The patient verbalized understanding of the proper use and possible adverse effects of terbinafine.  All of the patient's questions and concerns were addressed.
Albendazole Counseling:  I discussed with the patient the risks of albendazole including but not limited to cytopenia, kidney damage, nausea/vomiting and severe allergy.  The patient understands that this medication is being used in an off-label manner.
Cantharidin Counseling:  I discussed with the patient the risks of Cantharidin including but not limited to pain, redness, burning, itching, and blistering.
Qbrexza Counseling:  I discussed with the patient the risks of Qbrexza including but not limited to headache, mydriasis, blurred vision, dry eyes, nasal dryness, dry mouth, dry throat, dry skin, urinary hesitation, and constipation.  Local skin reactions including erythema, burning, stinging, and itching can also occur.
Low Dose Naltrexone Counseling- I discussed with the patient the potential risks and side effects of low dose naltrexone including but not limited to: more vivid dreams, headaches, nausea, vomiting, abdominal pain, fatigue, dizziness, and anxiety.
Topical Steroids Counseling: I discussed with the patient that prolonged use of topical steroids can result in the increased appearance of superficial blood vessels (telangiectasias), lightening (hypopigmentation) and thinning of the skin (atrophy).  Patient understands to avoid using high potency steroids in skin folds, the groin or the face.  The patient verbalized understanding of the proper use and possible adverse effects of topical steroids.  All of the patient's questions and concerns were addressed.
Eucrisa Counseling: Patient may experience a mild burning sensation during topical application. Eucrisa is not approved in children less than 3 months of age.
Oral Minoxidil Pregnancy And Lactation Text: This medication should only be used when clearly needed if you are pregnant, attempting to become pregnant or breast feeding.
Cimzia Pregnancy And Lactation Text: This medication crosses the placenta but can be considered safe in certain situations. Cimzia may be excreted in breast milk.
Spironolactone Pregnancy And Lactation Text: This medication can cause feminization of the male fetus and should be avoided during pregnancy. The active metabolite is also found in breast milk.
Xolair Counseling:  Patient informed of potential adverse effects including but not limited to fever, muscle aches, rash and allergic reactions.  The patient verbalized understanding of the proper use and possible adverse effects of Xolair.  All of the patient's questions and concerns were addressed.
Dutasteride Male Counseling: Dustasteride Counseling:  I discussed with the patient the risks of use of dutasteride including but not limited to decreased libido, decreased ejaculate volume, and gynecomastia. Women who can become pregnant should not handle medication.  All of the patient's questions and concerns were addressed.
Hyrimoz Counseling:  I discussed with the patient the risks of adalimumab including but not limited to myelosuppression, immunosuppression, autoimmune hepatitis, demyelinating diseases, lymphoma, and serious infections.  The patient understands that monitoring is required including a PPD at baseline and must alert us or the primary physician if symptoms of infection or other concerning signs are noted.
Thalidomide Counseling: I discussed with the patient the risks of thalidomide including but not limited to birth defects, anxiety, weakness, chest pain, dizziness, cough and severe allergy.
Olumiant Counseling: I discussed with the patient the risks of Olumiant therapy including but not limited to upper respiratory tract infections, shingles, cold sores, and nausea. Live vaccines should be avoided.  This medication has been linked to serious infections; higher rate of mortality; malignancy and lymphoproliferative disorders; major adverse cardiovascular events; thrombosis; gastrointestinal perforations; neutropenia; lymphopenia; anemia; liver enzyme elevations; and lipid elevations.
Cantharidin Pregnancy And Lactation Text: This medication has not been proven safe during pregnancy. It is unknown if this medication is excreted in breast milk.
Tremfya Counseling: I discussed with the patient the risks of guselkumab including but not limited to immunosuppression, serious infections, and drug reactions.  The patient understands that monitoring is required including a PPD at baseline and must alert us or the primary physician if symptoms of infection or other concerning signs are noted.
Aklief Pregnancy And Lactation Text: It is unknown if this medication is safe to use during pregnancy.  It is unknown if this medication is excreted in breast milk.  Breastfeeding women should use the topical cream on the smallest area of the skin for the shortest time needed while breastfeeding.  Do not apply to nipple and areola.
Doxycycline Pregnancy And Lactation Text: This medication is Pregnancy Category D and not consider safe during pregnancy. It is also excreted in breast milk but is considered safe for shorter treatment courses.
Azithromycin Counseling:  I discussed with the patient the risks of azithromycin including but not limited to GI upset, allergic reaction, drug rash, diarrhea, and yeast infections.
Acitretin Pregnancy And Lactation Text: This medication is Pregnancy Category X and should not be given to women who are pregnant or may become pregnant in the future. This medication is excreted in breast milk.
Methotrexate Counseling:  Patient counseled regarding adverse effects of methotrexate including but not limited to nausea, vomiting, abnormalities in liver function tests. Patients may develop mouth sores, rash, diarrhea, and abnormalities in blood counts. The patient understands that monitoring is required including LFT's and blood counts.  There is a rare possibility of scarring of the liver and lung problems that can occur when taking methotrexate. Persistent nausea, loss of appetite, pale stools, dark urine, cough, and shortness of breath should be reported immediately. Patient advised to discontinue methotrexate treatment at least three months before attempting to become pregnant.  I discussed the need for folate supplements while taking methotrexate.  These supplements can decrease side effects during methotrexate treatment. The patient verbalized understanding of the proper use and possible adverse effects of methotrexate.  All of the patient's questions and concerns were addressed.
Fluconazole Counseling:  Patient counseled regarding adverse effects of fluconazole including but not limited to headache, diarrhea, nausea, upset stomach, liver function test abnormalities, taste disturbance, and stomach pain.  There is a rare possibility of liver failure that can occur when taking fluconazole.  The patient understands that monitoring of LFTs and kidney function test may be required, especially at baseline. The patient verbalized understanding of the proper use and possible adverse effects of fluconazole.  All of the patient's questions and concerns were addressed.
Low Dose Naltrexone Pregnancy And Lactation Text: Naltrexone is pregnancy category C.  There have been no adequate and well-controlled studies in pregnant women.  It should be used in pregnancy only if the potential benefit justifies the potential risk to the fetus.   Limited data indicates that naltrexone is minimally excreted into breastmilk.
Dapsone Counseling: I discussed with the patient the risks of dapsone including but not limited to hemolytic anemia, agranulocytosis, rashes, methemoglobinemia, kidney failure, peripheral neuropathy, headaches, GI upset, and liver toxicity.  Patients who start dapsone require monitoring including baseline LFTs and weekly CBCs for the first month, then every month thereafter.  The patient verbalized understanding of the proper use and possible adverse effects of dapsone.  All of the patient's questions and concerns were addressed.
Complex Repair And Transposition Flap Text: The defect edges were debeveled with a #15 scalpel blade.  The primary defect was closed partially with a complex linear closure.  Given the location of the remaining defect, shape of the defect and the proximity to free margins a transposition flap was deemed most appropriate for complete closure of the defect.  Using a sterile surgical marker, an appropriate advancement flap was drawn incorporating the defect and placing the expected incisions within the relaxed skin tension lines where possible.    The area thus outlined was incised deep to adipose tissue with a #15 scalpel blade.  The skin margins were undermined to an appropriate distance in all directions utilizing iris scissors.

## 2025-07-22 NOTE — HPI: EVALUATION OF SKIN LESION(S)
What Type Of Note Output Would You Prefer (Optional)?: Bullet Format
Hpi Title: Evaluation of Skin Lesions
How Severe Are Your Spot(S)?: mild
Additional History: Patient is unsure of anything new or changing. She states she had some irritation on the base of her scalp but it has since resolved.

## 2025-07-22 NOTE — PROCEDURE: PRESCRIPTION MEDICATION MANAGEMENT
Plan: Overall, patient’s face looks a lot better, but there are still a few scaly lesions on her nose. She will treat the area in the fall. Patient is wanting to treat her legs in the fall as well. Suggested that she also treat her forearms at some point as well.
Render In Strict Bullet Format?: No
Detail Level: Zone
Initiate Treatment: [place] 4 % topical cream-Apply to nose, legs and forearms once daily for 4 weeks
Initiate Treatment: clindamycin phosphate 1 % topical solution : Apply to affected areas on scalp twice daily when flared
Plan: Rash is not present today, but will send a prescription for her to have on hand for when it flares up.

## 2025-08-04 ENCOUNTER — OFFICE VISIT (OUTPATIENT)
Dept: INTERNAL MEDICINE CLINIC | Facility: CLINIC | Age: 75
End: 2025-08-04
Payer: MEDICARE

## 2025-08-04 VITALS
OXYGEN SATURATION: 100 % | RESPIRATION RATE: 18 BRPM | DIASTOLIC BLOOD PRESSURE: 79 MMHG | SYSTOLIC BLOOD PRESSURE: 156 MMHG | HEIGHT: 65 IN | HEART RATE: 69 BPM | TEMPERATURE: 98.1 F | BODY MASS INDEX: 17.59 KG/M2 | WEIGHT: 105.6 LBS

## 2025-08-04 DIAGNOSIS — I10 PRIMARY HYPERTENSION: Primary | ICD-10-CM

## 2025-08-04 DIAGNOSIS — R91.1 LUNG NODULE SEEN ON IMAGING STUDY: ICD-10-CM

## 2025-08-04 DIAGNOSIS — D50.9 IRON DEFICIENCY ANEMIA, UNSPECIFIED IRON DEFICIENCY ANEMIA TYPE: ICD-10-CM

## 2025-08-04 DIAGNOSIS — H93.13 TINNITUS OF BOTH EARS: ICD-10-CM

## 2025-08-04 DIAGNOSIS — I10 PRIMARY HYPERTENSION: ICD-10-CM

## 2025-08-04 DIAGNOSIS — Z23 ENCOUNTER FOR IMMUNIZATION: ICD-10-CM

## 2025-08-04 DIAGNOSIS — G47.33 OSA ON CPAP: ICD-10-CM

## 2025-08-04 LAB
ANION GAP SERPL CALC-SCNC: 13 MMOL/L (ref 7–16)
BASOPHILS # BLD: 0.07 K/UL (ref 0–0.2)
BASOPHILS NFR BLD: 0.9 % (ref 0–2)
BUN SERPL-MCNC: 14 MG/DL (ref 8–23)
CALCIUM SERPL-MCNC: 10.3 MG/DL (ref 8.8–10.2)
CHLORIDE SERPL-SCNC: 96 MMOL/L (ref 98–107)
CO2 SERPL-SCNC: 28 MMOL/L (ref 20–29)
CREAT SERPL-MCNC: 0.89 MG/DL (ref 0.6–1.1)
DIFFERENTIAL METHOD BLD: ABNORMAL
EOSINOPHIL # BLD: 0.11 K/UL (ref 0–0.8)
EOSINOPHIL NFR BLD: 1.5 % (ref 0.5–7.8)
ERYTHROCYTE [DISTWIDTH] IN BLOOD BY AUTOMATED COUNT: 12.7 % (ref 11.9–14.6)
GLUCOSE SERPL-MCNC: 107 MG/DL (ref 70–99)
HCT VFR BLD AUTO: 37.3 % (ref 35.8–46.3)
HGB BLD-MCNC: 12.3 G/DL (ref 11.7–15.4)
IMM GRANULOCYTES # BLD AUTO: 0.03 K/UL (ref 0–0.5)
IMM GRANULOCYTES NFR BLD AUTO: 0.4 % (ref 0–5)
LYMPHOCYTES # BLD: 0.73 K/UL (ref 0.5–4.6)
LYMPHOCYTES NFR BLD: 9.9 % (ref 13–44)
MCH RBC QN AUTO: 30.5 PG (ref 26.1–32.9)
MCHC RBC AUTO-ENTMCNC: 33 G/DL (ref 31.4–35)
MCV RBC AUTO: 92.6 FL (ref 82–102)
MONOCYTES # BLD: 0.32 K/UL (ref 0.1–1.3)
MONOCYTES NFR BLD: 4.3 % (ref 4–12)
NEUTS SEG # BLD: 6.15 K/UL (ref 1.7–8.2)
NEUTS SEG NFR BLD: 83 % (ref 43–78)
NRBC # BLD: 0 K/UL (ref 0–0.2)
PLATELET # BLD AUTO: 317 K/UL (ref 150–450)
PMV BLD AUTO: 10.3 FL (ref 9.4–12.3)
POTASSIUM SERPL-SCNC: 3.9 MMOL/L (ref 3.5–5.1)
RBC # BLD AUTO: 4.03 M/UL (ref 4.05–5.2)
SODIUM SERPL-SCNC: 137 MMOL/L (ref 136–145)
WBC # BLD AUTO: 7.4 K/UL (ref 4.3–11.1)

## 2025-08-04 PROCEDURE — 1159F MED LIST DOCD IN RCRD: CPT | Performed by: NURSE PRACTITIONER

## 2025-08-04 PROCEDURE — 3017F COLORECTAL CA SCREEN DOC REV: CPT | Performed by: NURSE PRACTITIONER

## 2025-08-04 PROCEDURE — 3078F DIAST BP <80 MM HG: CPT | Performed by: NURSE PRACTITIONER

## 2025-08-04 PROCEDURE — G8419 CALC BMI OUT NRM PARAM NOF/U: HCPCS | Performed by: NURSE PRACTITIONER

## 2025-08-04 PROCEDURE — G8427 DOCREV CUR MEDS BY ELIG CLIN: HCPCS | Performed by: NURSE PRACTITIONER

## 2025-08-04 PROCEDURE — 1036F TOBACCO NON-USER: CPT | Performed by: NURSE PRACTITIONER

## 2025-08-04 PROCEDURE — 3077F SYST BP >= 140 MM HG: CPT | Performed by: NURSE PRACTITIONER

## 2025-08-04 PROCEDURE — G2211 COMPLEX E/M VISIT ADD ON: HCPCS | Performed by: NURSE PRACTITIONER

## 2025-08-04 PROCEDURE — 1160F RVW MEDS BY RX/DR IN RCRD: CPT | Performed by: NURSE PRACTITIONER

## 2025-08-04 PROCEDURE — 1123F ACP DISCUSS/DSCN MKR DOCD: CPT | Performed by: NURSE PRACTITIONER

## 2025-08-04 PROCEDURE — 1090F PRES/ABSN URINE INCON ASSESS: CPT | Performed by: NURSE PRACTITIONER

## 2025-08-04 PROCEDURE — 99214 OFFICE O/P EST MOD 30 MIN: CPT | Performed by: NURSE PRACTITIONER

## 2025-08-04 PROCEDURE — G8399 PT W/DXA RESULTS DOCUMENT: HCPCS | Performed by: NURSE PRACTITIONER

## 2025-08-04 RX ORDER — ZOSTER VACCINE RECOMBINANT, ADJUVANTED 50 MCG/0.5
0.5 KIT INTRAMUSCULAR SEE ADMIN INSTRUCTIONS
Qty: 0.5 ML | Refills: 0 | Status: SHIPPED | OUTPATIENT
Start: 2025-08-04 | End: 2026-01-31

## 2025-08-04 RX ORDER — LOSARTAN POTASSIUM 50 MG/1
50 TABLET ORAL DAILY
Qty: 90 TABLET | Refills: 1 | Status: SHIPPED | OUTPATIENT
Start: 2025-08-04

## 2025-08-04 RX ORDER — HYDROCHLOROTHIAZIDE 25 MG/1
25 TABLET ORAL EVERY MORNING
Qty: 90 TABLET | Refills: 3 | Status: SHIPPED | OUTPATIENT
Start: 2025-08-04

## 2025-08-04 ASSESSMENT — ENCOUNTER SYMPTOMS
BACK PAIN: 1
SHORTNESS OF BREATH: 0

## 2025-08-05 ENCOUNTER — TELEPHONE (OUTPATIENT)
Dept: INTERNAL MEDICINE CLINIC | Facility: CLINIC | Age: 75
End: 2025-08-05

## 2025-08-05 DIAGNOSIS — E03.9 HYPOTHYROIDISM, UNSPECIFIED TYPE: Primary | ICD-10-CM

## 2025-08-05 DIAGNOSIS — E03.9 HYPOTHYROIDISM, UNSPECIFIED TYPE: ICD-10-CM

## 2025-08-05 LAB — TSH W FREE THYROID IF ABNORMAL: 0.56 UIU/ML (ref 0.27–4.2)

## 2025-08-05 RX ORDER — LEVOTHYROXINE SODIUM 75 UG/1
75 TABLET ORAL DAILY
Qty: 90 TABLET | Refills: 1 | Status: SHIPPED | OUTPATIENT
Start: 2025-08-05

## 2025-08-29 ENCOUNTER — HOSPITAL ENCOUNTER (OUTPATIENT)
Dept: CT IMAGING | Age: 75
Discharge: HOME | End: 2025-08-31
Payer: MEDICARE

## 2025-08-29 DIAGNOSIS — R91.1 LUNG NODULE SEEN ON IMAGING STUDY: ICD-10-CM

## 2025-08-29 PROCEDURE — 71250 CT THORAX DX C-: CPT

## 2025-09-02 DIAGNOSIS — S22.060S COMPRESSION FRACTURE OF T8 VERTEBRA, SEQUELA: Primary | ICD-10-CM

## 2025-09-02 DIAGNOSIS — J44.9 CHRONIC OBSTRUCTIVE PULMONARY DISEASE, UNSPECIFIED COPD TYPE (HCC): ICD-10-CM

## 2025-09-02 DIAGNOSIS — G47.33 OSA ON CPAP: ICD-10-CM

## 2025-09-02 DIAGNOSIS — R91.1 LUNG NODULE SEEN ON IMAGING STUDY: ICD-10-CM

## 2025-09-02 RX ORDER — ALENDRONATE SODIUM 70 MG/1
70 TABLET ORAL
Qty: 13 TABLET | Refills: 0 | Status: SHIPPED | OUTPATIENT
Start: 2025-09-02 | End: 2025-09-04

## 2025-09-03 ENCOUNTER — OFFICE VISIT (OUTPATIENT)
Age: 75
End: 2025-09-03

## 2025-09-03 VITALS — WEIGHT: 108.7 LBS | HEIGHT: 65 IN | BODY MASS INDEX: 18.11 KG/M2

## 2025-09-03 DIAGNOSIS — S22.060A CLOSED WEDGE COMPRESSION FRACTURE OF T8 VERTEBRA, INITIAL ENCOUNTER (HCC): Primary | ICD-10-CM

## 2025-09-03 DIAGNOSIS — M51.360 DEGENERATION OF INTERVERTEBRAL DISC OF LUMBAR REGION WITH DISCOGENIC BACK PAIN: ICD-10-CM

## 2025-09-03 DIAGNOSIS — M40.204 KYPHOSIS OF THORACIC REGION, UNSPECIFIED KYPHOSIS TYPE: ICD-10-CM

## 2025-09-03 RX ORDER — BACLOFEN 5 MG/1
5 TABLET ORAL 3 TIMES DAILY PRN
Qty: 20 TABLET | Refills: 0 | Status: SHIPPED | OUTPATIENT
Start: 2025-09-03

## 2025-09-04 ENCOUNTER — OFFICE VISIT (OUTPATIENT)
Dept: INTERNAL MEDICINE CLINIC | Facility: CLINIC | Age: 75
End: 2025-09-04
Payer: MEDICARE

## 2025-09-04 ENCOUNTER — TELEPHONE (OUTPATIENT)
Dept: INTERNAL MEDICINE CLINIC | Facility: CLINIC | Age: 75
End: 2025-09-04

## 2025-09-04 VITALS
DIASTOLIC BLOOD PRESSURE: 61 MMHG | TEMPERATURE: 97.9 F | OXYGEN SATURATION: 100 % | BODY MASS INDEX: 17.76 KG/M2 | HEIGHT: 65 IN | RESPIRATION RATE: 18 BRPM | SYSTOLIC BLOOD PRESSURE: 131 MMHG | WEIGHT: 106.6 LBS | HEART RATE: 60 BPM

## 2025-09-04 DIAGNOSIS — M85.80 SENILE OSTEOPENIA: Primary | ICD-10-CM

## 2025-09-04 DIAGNOSIS — G47.33 OSA (OBSTRUCTIVE SLEEP APNEA): ICD-10-CM

## 2025-09-04 DIAGNOSIS — E03.9 HYPOTHYROIDISM, UNSPECIFIED TYPE: ICD-10-CM

## 2025-09-04 DIAGNOSIS — R91.1 LUNG NODULE SEEN ON IMAGING STUDY: ICD-10-CM

## 2025-09-04 DIAGNOSIS — I10 PRIMARY HYPERTENSION: ICD-10-CM

## 2025-09-04 DIAGNOSIS — S22.060S COMPRESSION FRACTURE OF T8 VERTEBRA, SEQUELA: Primary | ICD-10-CM

## 2025-09-04 PROCEDURE — 1159F MED LIST DOCD IN RCRD: CPT | Performed by: NURSE PRACTITIONER

## 2025-09-04 PROCEDURE — 1090F PRES/ABSN URINE INCON ASSESS: CPT | Performed by: NURSE PRACTITIONER

## 2025-09-04 PROCEDURE — 1123F ACP DISCUSS/DSCN MKR DOCD: CPT | Performed by: NURSE PRACTITIONER

## 2025-09-04 PROCEDURE — 1160F RVW MEDS BY RX/DR IN RCRD: CPT | Performed by: NURSE PRACTITIONER

## 2025-09-04 PROCEDURE — 99214 OFFICE O/P EST MOD 30 MIN: CPT | Performed by: NURSE PRACTITIONER

## 2025-09-04 PROCEDURE — 3075F SYST BP GE 130 - 139MM HG: CPT | Performed by: NURSE PRACTITIONER

## 2025-09-04 PROCEDURE — G2211 COMPLEX E/M VISIT ADD ON: HCPCS | Performed by: NURSE PRACTITIONER

## 2025-09-04 PROCEDURE — 3078F DIAST BP <80 MM HG: CPT | Performed by: NURSE PRACTITIONER

## 2025-09-04 PROCEDURE — G8427 DOCREV CUR MEDS BY ELIG CLIN: HCPCS | Performed by: NURSE PRACTITIONER

## 2025-09-04 PROCEDURE — G8419 CALC BMI OUT NRM PARAM NOF/U: HCPCS | Performed by: NURSE PRACTITIONER

## 2025-09-04 PROCEDURE — G8399 PT W/DXA RESULTS DOCUMENT: HCPCS | Performed by: NURSE PRACTITIONER

## 2025-09-04 PROCEDURE — 3017F COLORECTAL CA SCREEN DOC REV: CPT | Performed by: NURSE PRACTITIONER

## 2025-09-04 PROCEDURE — 1036F TOBACCO NON-USER: CPT | Performed by: NURSE PRACTITIONER

## 2025-09-04 RX ORDER — SODIUM CHLORIDE 9 MG/ML
INJECTION, SOLUTION INTRAVENOUS PRN
OUTPATIENT
Start: 2025-09-04

## 2025-09-04 RX ORDER — HYDROCHLOROTHIAZIDE 25 MG/1
12.5 TABLET ORAL EVERY MORNING
Qty: 90 TABLET | Refills: 3
Start: 2025-09-04

## 2025-09-04 RX ORDER — EPINEPHRINE 1 MG/ML
0.3 INJECTION, SOLUTION, CONCENTRATE INTRAVENOUS PRN
OUTPATIENT
Start: 2025-09-04

## 2025-09-04 RX ORDER — ACETAMINOPHEN 325 MG/1
650 TABLET ORAL
OUTPATIENT
Start: 2025-09-04

## 2025-09-04 RX ORDER — DIPHENHYDRAMINE HYDROCHLORIDE 50 MG/ML
50 INJECTION, SOLUTION INTRAMUSCULAR; INTRAVENOUS
OUTPATIENT
Start: 2025-09-04

## 2025-09-04 RX ORDER — HYDROCORTISONE SODIUM SUCCINATE 100 MG/2ML
100 INJECTION INTRAMUSCULAR; INTRAVENOUS
OUTPATIENT
Start: 2025-09-04

## 2025-09-04 RX ORDER — ONDANSETRON 2 MG/ML
8 INJECTION INTRAMUSCULAR; INTRAVENOUS
OUTPATIENT
Start: 2025-09-04

## 2025-09-04 RX ORDER — ALBUTEROL SULFATE 90 UG/1
4 INHALANT RESPIRATORY (INHALATION) PRN
OUTPATIENT
Start: 2025-09-04

## 2025-09-04 ASSESSMENT — ENCOUNTER SYMPTOMS
SORE THROAT: 0
COUGH: 0
BACK PAIN: 1
NAUSEA: 0
TROUBLE SWALLOWING: 0
WHEEZING: 0
SHORTNESS OF BREATH: 0

## (undated) DEVICE — T4 HOOD

## (undated) DEVICE — STAPLER SKIN SQ 30 ABSRB STPL DISP INSORB

## (undated) DEVICE — DUAL CUT SAGITTAL BLADE

## (undated) DEVICE — SOLUTION IRRIG 3000ML 0.9% SOD CHL FLX CONT 0797208] ICU MEDICAL INC]

## (undated) DEVICE — SUTURE ABSRB X-1 REV CUT 1/2 CIR 22MM UD BRAID 27IN SZ 3-0 J458H

## (undated) DEVICE — SUTURE ABS ANTIBACT 1-0 CTX 24IN STRATAFIX PDS+ SXPP1A445

## (undated) DEVICE — STAPLER SKIN H3.9MM WIRE DIA0.58MM CRWN 6.9MM 35 STPL FIX

## (undated) DEVICE — HOOD: Brand: FLYTE

## (undated) DEVICE — TIP SUCTION ORAL YANKAUER TIP --

## (undated) DEVICE — SOLUTION IV 1000ML 0.9% SOD CHL

## (undated) DEVICE — HANDPIECE SET WITH COAXIAL HIGH FLOW TIP AND SUCTION TUBE: Brand: INTERPULSE

## (undated) DEVICE — PIN FIX TROCAR PT 1 END 7/64X9 IN 1 PT STYL SMOOTH PLN STRL
Type: IMPLANTABLE DEVICE | Site: HIP | Status: NON-FUNCTIONAL
Removed: 2021-11-03

## (undated) DEVICE — SUTURE VCRL SZ 2-0 L27IN ABSRB UD L36MM CP-1 1/2 CIR REV J266H

## (undated) DEVICE — SUTURE ETHBND EXCEL SZ 5 L30IN NONABSORBABLE GRN L40MM V-37 MB66G

## (undated) DEVICE — SYR 10ML LUER LOK 1/5ML GRAD --

## (undated) DEVICE — (D)PREP SKN CHLRAPRP APPL 26ML -- CONVERT TO ITEM 371833

## (undated) DEVICE — 3.2MM X 18.3MM METAL CUTTING HELIOCOIDAL RASP

## (undated) DEVICE — BIPOLAR SEALER 23-112-1 AQM 6.0: Brand: AQUAMANTYS ®

## (undated) DEVICE — 3M™ IOBAN™ 2 ANTIMICROBIAL INCISE DRAPE 6651EZ: Brand: IOBAN™ 2

## (undated) DEVICE — SYR LR LCK 1ML GRAD NSAF 30ML --

## (undated) DEVICE — SOLUTION IV 250ML 0.9% SOD CHL CLR INJ FLX BG CONT PRT CLSR

## (undated) DEVICE — TOTAL HIP DR JENNINGS: Brand: MEDLINE INDUSTRIES, INC.

## (undated) DEVICE — 3M™ STERI-DRAPE™ INCISE DRAPE, XL 1051: Brand: STERI-DRAPE™

## (undated) DEVICE — SYR 50ML LR LCK 1ML GRAD NSAF --

## (undated) DEVICE — TRAY PREP DRY W/ PREM GLV 2 APPL 6 SPNG 2 UNDPD 1 OVERWRAP

## (undated) DEVICE — 450 ML BOTTLE OF 0.05% CHLORHEXIDINE GLUCONATE IN 99.95% STERILE WATER FOR IRRIGATION, USP AND APPLICATOR.: Brand: IRRISEPT ANTIMICROBIAL WOUND LAVAGE

## (undated) DEVICE — REM POLYHESIVE ADULT PATIENT RETURN ELECTRODE: Brand: VALLEYLAB

## (undated) DEVICE — Z DISCONTINUED PER MEDLINE USE 2741944 DRESSING AQUACEL 12 IN SURG W9XL30CM SIL CVR WTRPRF VIR BACT BARR ANTIMIC

## (undated) DEVICE — STOCKINETTE TUBE 6X48 -- MEDICHOICE

## (undated) DEVICE — PINNACLE HIP SOLUTIONS ALTRX POLYETHYLENE ACETABULAR LINER NEUTRAL 32MM ID 50MM OD
Type: IMPLANTABLE DEVICE | Site: HIP | Status: NON-FUNCTIONAL
Brand: PINNACLE ALTRX
Removed: 2021-11-03

## (undated) DEVICE — PINNACLE CANCELLOUS BONE SCREW 6.5MM X 25MM
Type: IMPLANTABLE DEVICE | Site: HIP | Status: NON-FUNCTIONAL
Brand: PINNACLE
Removed: 2021-11-03

## (undated) DEVICE — 3M™ STERI-DRAPE™ INSTRUMENT POUCH 1018: Brand: STERI-DRAPE™

## (undated) DEVICE — STOCKINETTE,IMPERVIOUS,12X48,STERILE: Brand: MEDLINE

## (undated) DEVICE — DRAPE TWL SURG 16X26IN BLU ORB04] ALLCARE INC]